# Patient Record
Sex: MALE | Race: WHITE | NOT HISPANIC OR LATINO | Employment: OTHER | URBAN - METROPOLITAN AREA
[De-identification: names, ages, dates, MRNs, and addresses within clinical notes are randomized per-mention and may not be internally consistent; named-entity substitution may affect disease eponyms.]

---

## 2020-09-12 ENCOUNTER — APPOINTMENT (OUTPATIENT)
Dept: RADIOLOGY | Facility: MEDICAL CENTER | Age: 75
DRG: 643 | End: 2020-09-12
Attending: EMERGENCY MEDICINE
Payer: MEDICARE

## 2020-09-12 ENCOUNTER — HOSPITAL ENCOUNTER (INPATIENT)
Facility: MEDICAL CENTER | Age: 75
LOS: 12 days | DRG: 643 | End: 2020-09-25
Attending: EMERGENCY MEDICINE | Admitting: HOSPITALIST
Payer: MEDICARE

## 2020-09-12 ENCOUNTER — APPOINTMENT (OUTPATIENT)
Dept: RADIOLOGY | Facility: MEDICAL CENTER | Age: 75
DRG: 643 | End: 2020-09-12
Attending: HOSPITALIST
Payer: MEDICARE

## 2020-09-12 DIAGNOSIS — D53.9 MACROCYTIC ANEMIA: ICD-10-CM

## 2020-09-12 DIAGNOSIS — G93.40 ACUTE ENCEPHALOPATHY: ICD-10-CM

## 2020-09-12 DIAGNOSIS — I16.1 HYPERTENSIVE EMERGENCY: ICD-10-CM

## 2020-09-12 DIAGNOSIS — I48.0 PAROXYSMAL ATRIAL FIBRILLATION (HCC): ICD-10-CM

## 2020-09-12 DIAGNOSIS — E87.6 HYPOKALEMIA: ICD-10-CM

## 2020-09-12 DIAGNOSIS — I63.9 CEREBROVASCULAR ACCIDENT (CVA), UNSPECIFIED MECHANISM (HCC): ICD-10-CM

## 2020-09-12 DIAGNOSIS — E87.1 HYPONATREMIA: ICD-10-CM

## 2020-09-12 PROBLEM — E78.5 DYSLIPIDEMIA: Status: ACTIVE | Noted: 2020-09-12

## 2020-09-12 PROBLEM — M54.9 BACK PAIN: Status: ACTIVE | Noted: 2020-09-12

## 2020-09-12 LAB
ABO + RH BLD: ABNORMAL
ABO GROUP BLD: ABNORMAL
ALBUMIN SERPL BCP-MCNC: 4.3 G/DL (ref 3.2–4.9)
ALBUMIN/GLOB SERPL: 1.7 G/DL
ALP SERPL-CCNC: 87 U/L (ref 30–99)
ALT SERPL-CCNC: 11 U/L (ref 2–50)
AMMONIA PLAS-SCNC: 18 UMOL/L (ref 11–45)
AMPHET UR QL SCN: NEGATIVE
ANION GAP SERPL CALC-SCNC: 14 MMOL/L (ref 7–16)
ANION GAP SERPL CALC-SCNC: 18 MMOL/L (ref 7–16)
APPEARANCE UR: CLEAR
APTT PPP: 33.8 SEC (ref 24.7–36)
AST SERPL-CCNC: 17 U/L (ref 12–45)
BACTERIA #/AREA URNS HPF: NEGATIVE /HPF
BARBITURATES UR QL SCN: NEGATIVE
BASOPHILS # BLD AUTO: 0.6 % (ref 0–1.8)
BASOPHILS # BLD: 0.04 K/UL (ref 0–0.12)
BENZODIAZ UR QL SCN: NEGATIVE
BILIRUB SERPL-MCNC: 1 MG/DL (ref 0.1–1.5)
BILIRUB UR QL STRIP.AUTO: NEGATIVE
BLD GP AB SCN SERPL QL: ABNORMAL
BUN SERPL-MCNC: 10 MG/DL (ref 8–22)
BUN SERPL-MCNC: 12 MG/DL (ref 8–22)
BZE UR QL SCN: NEGATIVE
CALCIUM SERPL-MCNC: 10 MG/DL (ref 8.5–10.5)
CALCIUM SERPL-MCNC: 10.1 MG/DL (ref 8.5–10.5)
CANNABINOIDS UR QL SCN: NEGATIVE
CHLORIDE SERPL-SCNC: 83 MMOL/L (ref 96–112)
CHLORIDE SERPL-SCNC: 84 MMOL/L (ref 96–112)
CO2 SERPL-SCNC: 25 MMOL/L (ref 20–33)
CO2 SERPL-SCNC: 28 MMOL/L (ref 20–33)
COLOR UR: YELLOW
COVID ORDER STATUS COVID19: NORMAL
CREAT SERPL-MCNC: 0.56 MG/DL (ref 0.5–1.4)
CREAT SERPL-MCNC: 0.71 MG/DL (ref 0.5–1.4)
EKG IMPRESSION: NORMAL
EOSINOPHIL # BLD AUTO: 0.02 K/UL (ref 0–0.51)
EOSINOPHIL NFR BLD: 0.3 % (ref 0–6.9)
EPI CELLS #/AREA URNS HPF: NEGATIVE /HPF
ERYTHROCYTE [DISTWIDTH] IN BLOOD BY AUTOMATED COUNT: 46.3 FL (ref 35.9–50)
GLOBULIN SER CALC-MCNC: 2.6 G/DL (ref 1.9–3.5)
GLUCOSE SERPL-MCNC: 115 MG/DL (ref 65–99)
GLUCOSE SERPL-MCNC: 125 MG/DL (ref 65–99)
GLUCOSE UR STRIP.AUTO-MCNC: 100 MG/DL
HCT VFR BLD AUTO: 36.7 % (ref 42–52)
HGB BLD-MCNC: 13.2 G/DL (ref 14–18)
HYALINE CASTS #/AREA URNS LPF: ABNORMAL /LPF
IMM GRANULOCYTES # BLD AUTO: 0.03 K/UL (ref 0–0.11)
IMM GRANULOCYTES NFR BLD AUTO: 0.4 % (ref 0–0.9)
INR PPP: 1.21 (ref 0.87–1.13)
KETONES UR STRIP.AUTO-MCNC: 15 MG/DL
LEUKOCYTE ESTERASE UR QL STRIP.AUTO: NEGATIVE
LYMPHOCYTES # BLD AUTO: 0.9 K/UL (ref 1–4.8)
LYMPHOCYTES NFR BLD: 12.5 % (ref 22–41)
MCH RBC QN AUTO: 35.9 PG (ref 27–33)
MCHC RBC AUTO-ENTMCNC: 36 G/DL (ref 33.7–35.3)
MCV RBC AUTO: 99.7 FL (ref 81.4–97.8)
METHADONE UR QL SCN: NEGATIVE
MICRO URNS: ABNORMAL
MONOCYTES # BLD AUTO: 0.64 K/UL (ref 0–0.85)
MONOCYTES NFR BLD AUTO: 8.9 % (ref 0–13.4)
NEUTROPHILS # BLD AUTO: 5.59 K/UL (ref 1.82–7.42)
NEUTROPHILS NFR BLD: 77.3 % (ref 44–72)
NITRITE UR QL STRIP.AUTO: NEGATIVE
NRBC # BLD AUTO: 0 K/UL
NRBC BLD-RTO: 0 /100 WBC
OPIATES UR QL SCN: NEGATIVE
OXYCODONE UR QL SCN: NEGATIVE
PCP UR QL SCN: NEGATIVE
PH UR STRIP.AUTO: 7.5 [PH] (ref 5–8)
PLATELET # BLD AUTO: 292 K/UL (ref 164–446)
PMV BLD AUTO: 9.4 FL (ref 9–12.9)
POTASSIUM SERPL-SCNC: 3.4 MMOL/L (ref 3.6–5.5)
POTASSIUM SERPL-SCNC: 3.5 MMOL/L (ref 3.6–5.5)
PROPOXYPH UR QL SCN: NEGATIVE
PROT SERPL-MCNC: 6.9 G/DL (ref 6–8.2)
PROT UR QL STRIP: 30 MG/DL
PROTHROMBIN TIME: 15.7 SEC (ref 12–14.6)
RBC # BLD AUTO: 3.68 M/UL (ref 4.7–6.1)
RBC # URNS HPF: ABNORMAL /HPF
RBC UR QL AUTO: ABNORMAL
RH BLD: ABNORMAL
SARS-COV-2 RNA RESP QL NAA+PROBE: NOTDETECTED
SODIUM SERPL-SCNC: 126 MMOL/L (ref 135–145)
SODIUM SERPL-SCNC: 126 MMOL/L (ref 135–145)
SP GR UR STRIP.AUTO: 1.01
SPECIMEN SOURCE: NORMAL
TROPONIN T SERPL-MCNC: 10 NG/L (ref 6–19)
UROBILINOGEN UR STRIP.AUTO-MCNC: 0.2 MG/DL
WBC # BLD AUTO: 7.2 K/UL (ref 4.8–10.8)
WBC #/AREA URNS HPF: ABNORMAL /HPF

## 2020-09-12 PROCEDURE — 93005 ELECTROCARDIOGRAM TRACING: CPT | Performed by: EMERGENCY MEDICINE

## 2020-09-12 PROCEDURE — 81001 URINALYSIS AUTO W/SCOPE: CPT

## 2020-09-12 PROCEDURE — A9270 NON-COVERED ITEM OR SERVICE: HCPCS | Performed by: HOSPITALIST

## 2020-09-12 PROCEDURE — 86900 BLOOD TYPING SEROLOGIC ABO: CPT

## 2020-09-12 PROCEDURE — 700111 HCHG RX REV CODE 636 W/ 250 OVERRIDE (IP): Performed by: HOSPITALIST

## 2020-09-12 PROCEDURE — 86850 RBC ANTIBODY SCREEN: CPT

## 2020-09-12 PROCEDURE — 700101 HCHG RX REV CODE 250: Performed by: HOSPITALIST

## 2020-09-12 PROCEDURE — U0003 INFECTIOUS AGENT DETECTION BY NUCLEIC ACID (DNA OR RNA); SEVERE ACUTE RESPIRATORY SYNDROME CORONAVIRUS 2 (SARS-COV-2) (CORONAVIRUS DISEASE [COVID-19]), AMPLIFIED PROBE TECHNIQUE, MAKING USE OF HIGH THROUGHPUT TECHNOLOGIES AS DESCRIBED BY CMS-2020-01-R: HCPCS

## 2020-09-12 PROCEDURE — G0378 HOSPITAL OBSERVATION PER HR: HCPCS

## 2020-09-12 PROCEDURE — 85610 PROTHROMBIN TIME: CPT

## 2020-09-12 PROCEDURE — 96375 TX/PRO/DX INJ NEW DRUG ADDON: CPT

## 2020-09-12 PROCEDURE — 82140 ASSAY OF AMMONIA: CPT

## 2020-09-12 PROCEDURE — 96372 THER/PROPH/DIAG INJ SC/IM: CPT

## 2020-09-12 PROCEDURE — 96365 THER/PROPH/DIAG IV INF INIT: CPT

## 2020-09-12 PROCEDURE — 80053 COMPREHEN METABOLIC PANEL: CPT

## 2020-09-12 PROCEDURE — 96366 THER/PROPH/DIAG IV INF ADDON: CPT

## 2020-09-12 PROCEDURE — 700105 HCHG RX REV CODE 258: Performed by: HOSPITALIST

## 2020-09-12 PROCEDURE — 86901 BLOOD TYPING SEROLOGIC RH(D): CPT

## 2020-09-12 PROCEDURE — 306565 RIGID MIT RESTRAINT(PAIR): Performed by: NURSE PRACTITIONER

## 2020-09-12 PROCEDURE — C9803 HOPD COVID-19 SPEC COLLECT: HCPCS | Performed by: INTERNAL MEDICINE

## 2020-09-12 PROCEDURE — 36415 COLL VENOUS BLD VENIPUNCTURE: CPT

## 2020-09-12 PROCEDURE — 70498 CT ANGIOGRAPHY NECK: CPT

## 2020-09-12 PROCEDURE — 700111 HCHG RX REV CODE 636 W/ 250 OVERRIDE (IP): Performed by: EMERGENCY MEDICINE

## 2020-09-12 PROCEDURE — 80048 BASIC METABOLIC PNL TOTAL CA: CPT

## 2020-09-12 PROCEDURE — 0042T CT-CEREBRAL PERFUSION ANALYSIS: CPT

## 2020-09-12 PROCEDURE — 700102 HCHG RX REV CODE 250 W/ 637 OVERRIDE(OP): Performed by: HOSPITALIST

## 2020-09-12 PROCEDURE — 70496 CT ANGIOGRAPHY HEAD: CPT

## 2020-09-12 PROCEDURE — 96376 TX/PRO/DX INJ SAME DRUG ADON: CPT

## 2020-09-12 PROCEDURE — 84484 ASSAY OF TROPONIN QUANT: CPT

## 2020-09-12 PROCEDURE — 99285 EMERGENCY DEPT VISIT HI MDM: CPT

## 2020-09-12 PROCEDURE — 70450 CT HEAD/BRAIN W/O DYE: CPT

## 2020-09-12 PROCEDURE — 700117 HCHG RX CONTRAST REV CODE 255: Performed by: EMERGENCY MEDICINE

## 2020-09-12 PROCEDURE — 96374 THER/PROPH/DIAG INJ IV PUSH: CPT

## 2020-09-12 PROCEDURE — 85025 COMPLETE CBC W/AUTO DIFF WBC: CPT

## 2020-09-12 PROCEDURE — 72080 X-RAY EXAM THORACOLMB 2/> VW: CPT

## 2020-09-12 PROCEDURE — 80307 DRUG TEST PRSMV CHEM ANLYZR: CPT

## 2020-09-12 PROCEDURE — 700101 HCHG RX REV CODE 250: Performed by: EMERGENCY MEDICINE

## 2020-09-12 PROCEDURE — 85730 THROMBOPLASTIN TIME PARTIAL: CPT

## 2020-09-12 PROCEDURE — 71045 X-RAY EXAM CHEST 1 VIEW: CPT

## 2020-09-12 PROCEDURE — 99220 PR INITIAL OBSERVATION CARE,LEVL III: CPT | Performed by: HOSPITALIST

## 2020-09-12 RX ORDER — SIMVASTATIN 20 MG
20 TABLET ORAL DAILY
Status: ON HOLD | COMMUNITY
End: 2020-10-05 | Stop reason: SDUPTHER

## 2020-09-12 RX ORDER — ONDANSETRON 4 MG/1
4 TABLET, ORALLY DISINTEGRATING ORAL EVERY 4 HOURS PRN
Status: DISCONTINUED | OUTPATIENT
Start: 2020-09-12 | End: 2020-09-14

## 2020-09-12 RX ORDER — SOTALOL HYDROCHLORIDE 80 MG/1
80-120 TABLET ORAL 2 TIMES DAILY
Status: DISCONTINUED | OUTPATIENT
Start: 2020-09-12 | End: 2020-09-12

## 2020-09-12 RX ORDER — SOTALOL HYDROCHLORIDE 120 MG/1
120 TABLET ORAL EVERY EVENING
Status: DISCONTINUED | OUTPATIENT
Start: 2020-09-12 | End: 2020-09-14

## 2020-09-12 RX ORDER — ALPRAZOLAM 0.5 MG/1
0.5 TABLET ORAL
Status: ON HOLD | COMMUNITY
End: 2020-09-25

## 2020-09-12 RX ORDER — ACETAMINOPHEN 500 MG
500 TABLET ORAL 4 TIMES DAILY
Status: DISCONTINUED | OUTPATIENT
Start: 2020-09-12 | End: 2020-09-14

## 2020-09-12 RX ORDER — METOPROLOL SUCCINATE 25 MG/1
25 TABLET, EXTENDED RELEASE ORAL DAILY
Status: ON HOLD | COMMUNITY
End: 2020-09-25

## 2020-09-12 RX ORDER — SOTALOL HYDROCHLORIDE 80 MG/1
80-120 TABLET ORAL 2 TIMES DAILY
Status: ON HOLD | COMMUNITY
End: 2020-10-05 | Stop reason: SDUPTHER

## 2020-09-12 RX ORDER — LEUPROLIDE ACETATE 45 MG
45 KIT INTRAMUSCULAR
COMMUNITY

## 2020-09-12 RX ORDER — POTASSIUM CHLORIDE 7.45 MG/ML
10 INJECTION INTRAVENOUS ONCE
Status: COMPLETED | OUTPATIENT
Start: 2020-09-12 | End: 2020-09-12

## 2020-09-12 RX ORDER — LABETALOL HYDROCHLORIDE 5 MG/ML
10 INJECTION, SOLUTION INTRAVENOUS EVERY 4 HOURS PRN
Status: DISCONTINUED | OUTPATIENT
Start: 2020-09-12 | End: 2020-09-25 | Stop reason: HOSPADM

## 2020-09-12 RX ORDER — ANTIOX #8/OM3/DHA/EPA/LUT/ZEAX 250-2.5 MG
1 CAPSULE ORAL DAILY
COMMUNITY

## 2020-09-12 RX ORDER — POTASSIUM CHLORIDE 20 MEQ/1
20 TABLET, EXTENDED RELEASE ORAL ONCE
Status: ACTIVE | OUTPATIENT
Start: 2020-09-12 | End: 2020-09-13

## 2020-09-12 RX ORDER — BUDESONIDE AND FORMOTEROL FUMARATE DIHYDRATE 160; 4.5 UG/1; UG/1
2 AEROSOL RESPIRATORY (INHALATION) 2 TIMES DAILY PRN
Status: ON HOLD | COMMUNITY
End: 2020-10-05 | Stop reason: SDUPTHER

## 2020-09-12 RX ORDER — ONDANSETRON 2 MG/ML
4 INJECTION INTRAMUSCULAR; INTRAVENOUS EVERY 4 HOURS PRN
Status: DISCONTINUED | OUTPATIENT
Start: 2020-09-12 | End: 2020-09-14

## 2020-09-12 RX ORDER — SOTALOL HYDROCHLORIDE 80 MG/1
80 TABLET ORAL EVERY MORNING
Status: DISCONTINUED | OUTPATIENT
Start: 2020-09-13 | End: 2020-09-14

## 2020-09-12 RX ORDER — ACETAMINOPHEN 500 MG
1000 TABLET ORAL 2 TIMES DAILY PRN
COMMUNITY

## 2020-09-12 RX ORDER — MAGNESIUM SULFATE HEPTAHYDRATE 40 MG/ML
2 INJECTION, SOLUTION INTRAVENOUS ONCE
Status: COMPLETED | OUTPATIENT
Start: 2020-09-12 | End: 2020-09-12

## 2020-09-12 RX ORDER — SODIUM CHLORIDE 9 MG/ML
INJECTION, SOLUTION INTRAVENOUS CONTINUOUS
Status: DISCONTINUED | OUTPATIENT
Start: 2020-09-12 | End: 2020-09-16

## 2020-09-12 RX ORDER — LABETALOL HYDROCHLORIDE 5 MG/ML
10 INJECTION, SOLUTION INTRAVENOUS ONCE
Status: COMPLETED | OUTPATIENT
Start: 2020-09-12 | End: 2020-09-12

## 2020-09-12 RX ORDER — MONTELUKAST SODIUM 10 MG/1
10 TABLET ORAL DAILY
Status: ON HOLD | COMMUNITY
End: 2020-10-05 | Stop reason: SDUPTHER

## 2020-09-12 RX ORDER — AMOXICILLIN 250 MG
2 CAPSULE ORAL 2 TIMES DAILY
Status: DISCONTINUED | OUTPATIENT
Start: 2020-09-12 | End: 2020-09-14

## 2020-09-12 RX ORDER — MONTELUKAST SODIUM 10 MG/1
10 TABLET ORAL DAILY
Status: DISCONTINUED | OUTPATIENT
Start: 2020-09-13 | End: 2020-09-14

## 2020-09-12 RX ORDER — SIMVASTATIN 20 MG
20 TABLET ORAL EVERY EVENING
Status: DISCONTINUED | OUTPATIENT
Start: 2020-09-12 | End: 2020-09-14

## 2020-09-12 RX ORDER — POLYETHYLENE GLYCOL 3350 17 G/17G
1 POWDER, FOR SOLUTION ORAL
Status: DISCONTINUED | OUTPATIENT
Start: 2020-09-12 | End: 2020-09-14

## 2020-09-12 RX ORDER — FEXOFENADINE HCL 180 MG/1
180 TABLET ORAL DAILY
COMMUNITY

## 2020-09-12 RX ORDER — SIMETHICONE 180 MG
180 CAPSULE ORAL DAILY
Status: ON HOLD | COMMUNITY
End: 2020-10-05

## 2020-09-12 RX ORDER — LIDOCAINE 4 G/G
1 PATCH TOPICAL
COMMUNITY

## 2020-09-12 RX ORDER — LORAZEPAM 2 MG/ML
1 INJECTION INTRAMUSCULAR ONCE
Status: COMPLETED | OUTPATIENT
Start: 2020-09-12 | End: 2020-09-12

## 2020-09-12 RX ORDER — LIDOCAINE 50 MG/G
1 PATCH TOPICAL
Status: DISCONTINUED | OUTPATIENT
Start: 2020-09-12 | End: 2020-09-25 | Stop reason: HOSPADM

## 2020-09-12 RX ORDER — BISACODYL 10 MG
10 SUPPOSITORY, RECTAL RECTAL
Status: DISCONTINUED | OUTPATIENT
Start: 2020-09-12 | End: 2020-09-14

## 2020-09-12 RX ORDER — BUDESONIDE AND FORMOTEROL FUMARATE DIHYDRATE 160; 4.5 UG/1; UG/1
2 AEROSOL RESPIRATORY (INHALATION) 2 TIMES DAILY PRN
Status: DISCONTINUED | OUTPATIENT
Start: 2020-09-12 | End: 2020-09-25 | Stop reason: HOSPADM

## 2020-09-12 RX ORDER — ACETAMINOPHEN 650 MG/1
650 SUPPOSITORY RECTAL ONCE
Status: COMPLETED | OUTPATIENT
Start: 2020-09-12 | End: 2020-09-13

## 2020-09-12 RX ORDER — ANTIOX #8/OM3/DHA/EPA/LUT/ZEAX 250-2.5 MG
1 CAPSULE ORAL DAILY
Status: DISCONTINUED | OUTPATIENT
Start: 2020-09-12 | End: 2020-09-12

## 2020-09-12 RX ORDER — METOPROLOL SUCCINATE 25 MG/1
25 TABLET, EXTENDED RELEASE ORAL DAILY
Status: DISCONTINUED | OUTPATIENT
Start: 2020-09-13 | End: 2020-09-14

## 2020-09-12 RX ADMIN — IOHEXOL 80 ML: 350 INJECTION, SOLUTION INTRAVENOUS at 13:00

## 2020-09-12 RX ADMIN — LABETALOL HYDROCHLORIDE 10 MG: 5 INJECTION, SOLUTION INTRAVENOUS at 14:12

## 2020-09-12 RX ADMIN — ENOXAPARIN SODIUM 40 MG: 40 INJECTION SUBCUTANEOUS at 15:55

## 2020-09-12 RX ADMIN — LIDOCAINE 1 PATCH: 50 PATCH TOPICAL at 17:59

## 2020-09-12 RX ADMIN — LABETALOL HYDROCHLORIDE 10 MG: 5 INJECTION, SOLUTION INTRAVENOUS at 13:42

## 2020-09-12 RX ADMIN — LABETALOL HYDROCHLORIDE 10 MG: 5 INJECTION, SOLUTION INTRAVENOUS at 21:48

## 2020-09-12 RX ADMIN — LORAZEPAM 1 MG: 2 INJECTION INTRAMUSCULAR; INTRAVENOUS at 13:58

## 2020-09-12 RX ADMIN — IOHEXOL 40 ML: 350 INJECTION, SOLUTION INTRAVENOUS at 13:02

## 2020-09-12 RX ADMIN — POTASSIUM CHLORIDE 10 MEQ: 7.46 INJECTION, SOLUTION INTRAVENOUS at 21:22

## 2020-09-12 RX ADMIN — SODIUM CHLORIDE: 9 INJECTION, SOLUTION INTRAVENOUS at 15:50

## 2020-09-12 RX ADMIN — MAGNESIUM SULFATE 2 G: 2 INJECTION INTRAVENOUS at 18:02

## 2020-09-12 ASSESSMENT — CHA2DS2 SCORE
DIABETES: NO
SEX: MALE
CHA2DS2 VASC SCORE: 5
AGE 75 OR GREATER: YES
CHF OR LEFT VENTRICULAR DYSFUNCTION: NO
VASCULAR DISEASE: NO
AGE 65 TO 74: NO
HYPERTENSION: YES
PRIOR STROKE OR TIA OR THROMBOEMBOLISM: YES

## 2020-09-12 ASSESSMENT — PAIN DESCRIPTION - PAIN TYPE: TYPE: ACUTE PAIN

## 2020-09-12 ASSESSMENT — FIBROSIS 4 INDEX: FIB4 SCORE: 1.33

## 2020-09-12 NOTE — PROGRESS NOTES
Triage note    76 yo man with afib on Eliquis who presented with confusion and right side weakness.  CT imaging was done  Neuro consulted and unlikely stroke. Patient is mainly confused and agitated.  Hypertensive to 232/112  Dr. Liane Eden to admit

## 2020-09-12 NOTE — CONSULTS
Called by Dr. Reid:    Patient awoke and was slightly confused.  His confusion progressed and eventually EMS was called.  He was noted to have some right hemiparesis while being transferred however this resolved.  Stroke IR was activated and a CT head without contrast did not show hemorrhage.  CTA of the head and neck did not show large vessel occlusion.  CT perfusion was artifactual.  On presentation the patient's systolic blood pressure was 230 for which he received some labetalol.  He is also noted to have a sodium of 126.  At this point, the patient is not a candidate for intervention as there is no large vessel occlusion and he is not a candidate for TPA as last known well is greater than 4-1/2 hours.  It is likely at this point that the patient's deficits are secondary to high blood pressure and hyponatremia and likely a metabolic encephalopathy.  An MRI of the brain without contrast should be obtained in order to conclusively rule out ischemia.  If there is ischemia on said MRI then call neurology for further recommendations.

## 2020-09-12 NOTE — ASSESSMENT & PLAN NOTE
Improving.    Etiology is not entirely clear patient evaluated by neurology not felt to be a TPA candidate.  Possible hypertensive encephalopathy and some component of metabolic encephalopathy.  CT head and CTA head and neck reviewed.  No evidence of acute infection, procal wnl.  MRI of brain with chronic but no acute abnormalities.   EEG with encephalopathy, no seizure captured.  Not polypharmacy given med list.  Continue pt/ot/speech therapies

## 2020-09-12 NOTE — ED PROVIDER NOTES
ED Provider Note    CHIEF COMPLAINT  Chief Complaint   Patient presents with   • Possible Stroke   • ALOC   • T-5000 FALL        HPI  Jordan Alexander is a 75 y.o. male who presents with altered mental status and possible stroke.  Patient was seen here as a code stroke.  Patient was last known well according to his wife at 6:30 AM.  He woke with some mild confusion but that is his baseline.  However he quickly became worse and had a dense right-sided hemiparesis when he was seen by the outside EMS agency.  He was transferred here.  Patient is on Eliquis for A. fib.  No further details of the history of present illness could be obtained within the constraints of the urgency the patient's clinical condition.  He is altered and confused.  Patient did have a fall earlier in the week    REVIEW OF SYSTEMS  See HPI for further details.  No fever no chills.  No cough or cold symptoms.  All other systems are negative.    PAST MEDICAL HISTORY  No past medical history on file.    FAMILY HISTORY  No family history on file.    SOCIAL HISTORY  Social History     Socioeconomic History   • Marital status: Single     Spouse name: Not on file   • Number of children: Not on file   • Years of education: Not on file   • Highest education level: Not on file   Occupational History   • Not on file   Social Needs   • Financial resource strain: Not on file   • Food insecurity     Worry: Not on file     Inability: Not on file   • Transportation needs     Medical: Not on file     Non-medical: Not on file   Tobacco Use   • Smoking status: Not on file   Substance and Sexual Activity   • Alcohol use: Not on file   • Drug use: Not on file   • Sexual activity: Not on file   Lifestyle   • Physical activity     Days per week: Not on file     Minutes per session: Not on file   • Stress: Not on file   Relationships   • Social connections     Talks on phone: Not on file     Gets together: Not on file     Attends Hoah[employer]ism service: Not on file     Active  "member of club or organization: Not on file     Attends meetings of clubs or organizations: Not on file     Relationship status: Not on file   • Intimate partner violence     Fear of current or ex partner: Not on file     Emotionally abused: Not on file     Physically abused: Not on file     Forced sexual activity: Not on file   Other Topics Concern   • Not on file   Social History Narrative   • Not on file       SURGICAL HISTORY  No past surgical history on file.    CURRENT MEDICATIONS  Home Medications     Reviewed by Shelley Lacy R.N. (Registered Nurse) on 09/12/20 at 1259  Med List Status: Partial   Medication Last Dose Status   Apixaban (ELIQUIS PO)  Active                ALLERGIES  No Known Allergies    PHYSICAL EXAM  VITAL SIGNS: BP (!) 232/112   Pulse 81   Temp 36.3 °C (97.4 °F) (Temporal)   Resp 18   Ht 1.702 m (5' 7\")   Wt 68 kg (150 lb) Comment: 2 staff estimate  SpO2 95%   BMI 23.49 kg/m²   Constitutional: Well developed, Well nourished, No acute distress,   HENT: Normocephalic atraumatic  Eyes: Pupils are equal reactive to light extraocular movements are intact  Neck: Normal range of motion, No tenderness,   Cardiovascular: Normal heart rate, Normal rhythm, No murmurs, No rubs, No gallops.   Thorax & Lungs: Normal breath sounds, No respiratory distress,   Abdomen: Bowel sounds normal, Soft, No tenderness, No masses, No pulsatile masses.   Skin: Warm, Dry, No erythema, No rash.   Back: No tenderness, No CVA tenderness.   Extremities: No edema, No tenderness, No cyanosis, No clubbing. Dorsalis pedis pulses 2+ equal bilaterally. Radial pulses 2+ equal bilaterally   Neurologic: Normal deep tendon reflexes.  NIH stroke scale was 13 on arrival.  Strength sensation shows decreased strength and sensation in the right upper and lower extremity.  Psychiatric: Affect flat.  Awake.  Oriented only to self.    EKG  Sinus rhythm at a rate of 80.  Normal P waves.  First-degree AV block.  R prime in V1 and V2 " consistent with a incomplete right bundle branch block.  ST depression V4 V5 V6.  Abnormal EKG showing no signs of atrial fibrillation.    RADIOLOGY/PROCEDURES  DX-THORACOLUMBAR SPINE-2 VIEWS   Final Result      1.  No acute fracture or dislocation identified.      2.  Degenerative changes are noted.      DX-CHEST-PORTABLE (1 VIEW)   Final Result      1.  Cardiomegaly.      2.  Mild bilateral patchy pulmonary infiltrates.      CT-CTA NECK WITH & W/O-POST PROCESSING   Final Result      1.  Atherosclerotic plaque involving the common and internal carotid arteries bilaterally most prominently seen at the bifurcations. This results in less than 50% diameter narrowing bilaterally.      2.  Atherosclerotic plaque involving the vertebral arteries bilaterally. No evidence of occlusion.      CT-CEREBRAL PERFUSION ANALYSIS   Final Result      1.  Cerebral blood flow less than 30% likely representing completed infarct = 0 mL.      2.  T Max more than 6 seconds likely representing combination of completed infarct and ischemia = 267 mL.      3.  Mismatched volume likely representing ischemic brain/penumbra = 267 mL      4.  Please note that the cerebral perfusion was performed on the limited brain tissue around the basal ganglia region. Infarct/ischemia outside the CT perfusion sections can be missed in this study.      CT-CTA HEAD WITH & W/O-POST PROCESS   Final Result      No evidence of intracranial vascular occlusion or aneurysm.      CT-HEAD W/O   Final Result         NO ACUTE ABNORMALITIES ARE NOTED ON CT SCAN OF THE HEAD.      Findings are consistent with atrophy.  Decreased attenuation in the periventricular white matter likely indicates microvascular ischemic disease.      MR-BRAIN-W/O    (Results Pending)         COURSE & MEDICAL DECISION MAKING  Pertinent Labs & Imaging studies reviewed. (See chart for details)  Differential diagnosis includes stroke versus hypertensive emergency.  I consulted the neurologist   Clari.  He wants to get an MRI of the brain.  CT scan did not show any signs of hemorrhage.  Patient was given IV labetalol for blood pressure control    Patient is not a TPA candidate as he is outside the 4-1/2-hour window.  He is also on Eliquis.  NIH stroke scale was 13 on arrival.  There is no large vessel occlusion.  Patient is not a candidate for thrombectomy.    Patient is critically ill.   The patient continues to have: Altered mental status delirium  The vital organ system that is affected is the: Brain  If untreated there is a high chance of deterioration into: Coma  And eventually death.   The critical care that I am providing today is: Involving medical management multiple consultations  The critical that has been undertaken is medically complex.   There has been no overlap in critical care time.   Critical Care Time not including procedures: 40 minutes    FINAL IMPRESSION    1. Cerebrovascular accident (CVA), unspecified mechanism (HCC)     2. Hypertensive emergency     3. Hyponatremia       2.   3.          Electronically signed by: Bandar Reid M.D., 9/12/2020 2:23 PM

## 2020-09-12 NOTE — ED NOTES
Assist RN to:  Assigned floor RN Shima contacted, notified pt ready for transfer.    
Assist RN to:  covid swab obtained and sent   
ERP aware pt now moving R side with improved strength.  Pt remains restless and trying to get out of bed.  Hypertensive.  Report given to Romina VALLECILLO.  
Med rec complete per pt's wife Cynthia @ [phone]  Allergies reviewed - NKDA  No ABX in last 14 days   
Unable to complete med rec   Pt unable to interview   No other information at this time     Will update if more information becomes available   
no

## 2020-09-12 NOTE — PROGRESS NOTES
Report received Yanni PERERA RN. Pt transferred to room via [place] with ZOLL with this RN. Pt confused, not following commands, non verbal, restless. Tele monitor on.  Assessment complete.  Explained importance of calling before getting OOB. Call light and belongings within reach. Bed alarm on. Bed in the lowest position. Treaded socks in place. Hourly rounding in progress. Will continue to monitor .

## 2020-09-12 NOTE — PROGRESS NOTES
2 RN skin check complete with Marina RN  Devices in place : scds, PIV.  Skin assessed under devices : yes.  Confirmed pressure ulcers found on : none.  New potential pressure ulcers noted on : none. Wound consult placed : N/A    .Noted skin tear to right forearm, sacrum red but blanching.    The following interventions in place: mepilex to sacrum, waffle cushion, q2 turns, condom cath for incontinence, barrier cream

## 2020-09-12 NOTE — ASSESSMENT & PLAN NOTE
Improving some, 127-130  Low serum osm, likely hypovolemic on presentation, but did not improve with NS. No new urine osm collected, was high on admission.  High urine sodium may suggest SIADH, but this is a diagnosis of exclusion and there is no clear provoking factor for SIADH.  Free water flushes are being held and free water components of IV medications minimized.

## 2020-09-12 NOTE — ED TRIAGE NOTES
"Jordan Alexander 75 y.o. male bib EMS as a field ambulance from [place] for     Chief Complaint   Patient presents with   • Possible Stroke   • ALOC   • T-5000 FALL     Per EMS, pt woke with \"normal baseline slight confusion\" at 0630 but then became increasingly confused until 1100 when pt's wife called 911.  Then EMS states patient had RLE weakness onset at 1105.  Wife reported patient had a fall last week and did not get evaluated after the fall.  Pt takes Eliquis for a-fib.  Minimal history was obtainable by EMS and pt has no historical visits.  Pt arrives w/ RUE and RLE weakness, non-verbal, moaning intermittently and appears in pain but unable to assess where.  Pt labs drawn at charge desk.  Pt to CT via ivan dalal/ RN.  Pt to R04, report given to Romina VALLECILLO.  Pt now more verbal, answers some yes/no questions but does not form sentences.  Pt restless, agitated, trying to get up out of gurney and roll to his side.  Pt hypertensive.    "

## 2020-09-12 NOTE — H&P
Hospital Medicine History & Physical Note    Date of Service  9/12/2020    Primary Care Physician  No primary care provider on file.    Consultants  Neurology    Code Status  Full Code    Chief Complaint  Chief Complaint   Patient presents with   • Possible Stroke   • ALOC   • T-5000 FALL       History of Presenting Illness  75 y.o. male who presented 9/12/2020 with altered mental status.  Apparently the patient was transferred for evaluation of altered mental status that started this morning progressively got worse was associated with some right-sided weakness that started around 11:05 a.m..  The patient was transferred as a code stroke he was evaluated by neurology and felt that his symptoms are likely related to hypertensive and possibly metabolic encephalopathy.  The patient is unable to provide any history and there is no family at the bedside information is obtained from review of available records and discussion with ED staff.  Apparently the patient sustained a fall 1 week ago details of his fall is not available.  He was significantly hypertensive on presentation with a systolic blood pressure of 232/112 and he was noted to have hyponatremia.  He received labetalol.  At the time of my examination patient systolic blood pressure is154/82.  He is impulsive and agitated he is not answering any questions.  Apparently he is on Eliquis for history of atrial fibrillation.  He has no prior medical history in our system and is unable to participate in review of systems or give any further details about his medical history.    Review of Systems  Review of Systems   Unable to perform ROS: Mental status change       Past Medical History  Unable to obtain due to his medical condition    Surgical History  Unable to obtain due to his medical condition    Family History  Unable to obtain due to his medical condition    Social History    Unable to obtain due to his medical condition    Allergies  No Known  Allergies    Medications  Prior to Admission Medications   Prescriptions Last Dose Informant Patient Reported? Taking?   apixaban (ELIQUIS) 5mg Tab   Yes No   Sig: Take 5 mg by mouth 2 Times a Day.   leuprolide acetate, 6 Month, (LUPRON DEPOT, 6-MONTH,) 45 MG Kit kit 2020 at unknown  Yes Yes   Si mg by Intramuscular route every 6 months.   simvastatin (ZOCOR) 20 MG Tab   Yes No   Sig: Take 20 mg by mouth every day.      Facility-Administered Medications: None       Physical Exam  Temp:  [36.3 °C (97.4 °F)] 36.3 °C (97.4 °F)  Pulse:  [81-99] 99  Resp:  [10-18] 10  BP: (140-232)/() 140/82  SpO2:  [92 %-95 %] 93 %    Physical Exam  Vitals signs and nursing note reviewed.   Constitutional:       Appearance: He is well-developed. He is not diaphoretic.   HENT:      Head: Normocephalic and atraumatic.      Mouth/Throat:      Pharynx: No oropharyngeal exudate.   Eyes:      General: No scleral icterus.        Right eye: No discharge.         Left eye: No discharge.      Conjunctiva/sclera: Conjunctivae normal.      Pupils: Pupils are equal, round, and reactive to light.   Neck:      Musculoskeletal: Neck supple.      Vascular: No JVD.      Trachea: No tracheal deviation.   Cardiovascular:      Rate and Rhythm: Regular rhythm. Tachycardia present.      Heart sounds: No murmur. No friction rub. No gallop.    Pulmonary:      Effort: Pulmonary effort is normal. No respiratory distress.      Breath sounds: Normal breath sounds. No stridor. No wheezing.   Chest:      Chest wall: No tenderness.   Abdominal:      General: Bowel sounds are normal. There is no distension.      Palpations: Abdomen is soft.      Tenderness: There is no abdominal tenderness. There is no rebound.   Musculoskeletal:         General: Tenderness present.   Skin:     General: Skin is warm and dry.      Nails: There is no clubbing.     Neurological:      Mental Status: He is alert. He is disoriented.      Motor: No abnormal muscle tone.       Comments: Patient is agitated does not follow commands moves all extremities spontaneously unable to answer any questions   Psychiatric:         Behavior: Behavior is agitated.         Cognition and Memory: Cognition is impaired.         Judgment: Judgment is impulsive.         Laboratory:  Recent Labs     09/12/20  1237   WBC 7.2   RBC 3.68*   HEMOGLOBIN 13.2*   HEMATOCRIT 36.7*   MCV 99.7*   MCH 35.9*   MCHC 36.0*   RDW 46.3   PLATELETCT 292   MPV 9.4     Recent Labs     09/12/20  1237   SODIUM 126*   POTASSIUM 3.5*   CHLORIDE 84*   CO2 28   GLUCOSE 115*   BUN 12   CREATININE 0.71   CALCIUM 10.1     Recent Labs     09/12/20  1237   ALTSGPT 11   ASTSGOT 17   ALKPHOSPHAT 87   TBILIRUBIN 1.0   GLUCOSE 115*     Recent Labs     09/12/20  1237   APTT 33.8   INR 1.21*     No results for input(s): NTPROBNP in the last 72 hours.      Recent Labs     09/12/20  1237   TROPONINT 10       Imaging:  DX-CHEST-PORTABLE (1 VIEW)   Final Result      1.  Cardiomegaly.      2.  Mild bilateral patchy pulmonary infiltrates.      CT-CTA NECK WITH & W/O-POST PROCESSING   Final Result      1.  Atherosclerotic plaque involving the common and internal carotid arteries bilaterally most prominently seen at the bifurcations. This results in less than 50% diameter narrowing bilaterally.      2.  Atherosclerotic plaque involving the vertebral arteries bilaterally. No evidence of occlusion.      CT-CEREBRAL PERFUSION ANALYSIS   Final Result      1.  Cerebral blood flow less than 30% likely representing completed infarct = 0 mL.      2.  T Max more than 6 seconds likely representing combination of completed infarct and ischemia = 267 mL.      3.  Mismatched volume likely representing ischemic brain/penumbra = 267 mL      4.  Please note that the cerebral perfusion was performed on the limited brain tissue around the basal ganglia region. Infarct/ischemia outside the CT perfusion sections can be missed in this study.      CT-CTA HEAD WITH &  W/O-POST PROCESS   Final Result      No evidence of intracranial vascular occlusion or aneurysm.      CT-HEAD W/O   Final Result         NO ACUTE ABNORMALITIES ARE NOTED ON CT SCAN OF THE HEAD.      Findings are consistent with atrophy.  Decreased attenuation in the periventricular white matter likely indicates microvascular ischemic disease.      MR-BRAIN-W/O    (Results Pending)         Assessment/Plan:  I anticipate this patient is appropriate for observation status at this time.    * Acute encephalopathy  Assessment & Plan  Etiology is not entirely clear patient evaluated by neurology not felt to be a TPA candidate  Possible hypertensive encephalopathy and some component of metabolic encephalopathy  CT head and CTA head and neck reviewed  Patient will be admitted for close clinical monitoring  We will check MRI of brain  His blood pressure is improved after IV labetalol will review and reorder his home meds and monitor blood pressure and order PRN labetalol  Avoid sedating agents  We will check urinalysis urine drug screen and ammonia level    Hypokalemia  Assessment & Plan  Replete and monitor    Dyslipidemia  Assessment & Plan  Continue simvastatin    Back pain  Assessment & Plan  Given recent fall will check x-ray    Paroxysmal atrial fibrillation (HCC)  Assessment & Plan  Continue Eliquis and sotalol    Hyponatremia  Assessment & Plan  Possibly contributing to his acute encephalopathy  IV hydration and monitor levels  Etiology is not entirely clear          Addendum:    I was able to reach patient's wife by phone and obtain further history.  Apparently he fell about 10 days ago and has been complaining of low back pain and had a mild contusion to his scalp.  His confusion started this morning initially with difficulty finding words and not following commands and progressively got worse.  She reports that he had a similar episode a few years ago and had a stroke work-up which was negative and his symptoms  resolved spontaneously.  She confirms that he has A. fib and has been taking Eliquis and sotalol and metoprolol.  She reports that he has chronic hyponatremia and that his sodium is usually in the 120s.  She reports that his blood pressure is usually under control.

## 2020-09-13 ENCOUNTER — APPOINTMENT (OUTPATIENT)
Dept: RADIOLOGY | Facility: MEDICAL CENTER | Age: 75
DRG: 643 | End: 2020-09-13
Attending: HOSPITALIST
Payer: MEDICARE

## 2020-09-13 LAB
ANION GAP SERPL CALC-SCNC: 16 MMOL/L (ref 7–16)
BASOPHILS # BLD AUTO: 0.2 % (ref 0–1.8)
BASOPHILS # BLD: 0.02 K/UL (ref 0–0.12)
BUN SERPL-MCNC: 12 MG/DL (ref 8–22)
CALCIUM SERPL-MCNC: 9.7 MG/DL (ref 8.5–10.5)
CHLORIDE SERPL-SCNC: 83 MMOL/L (ref 96–112)
CO2 SERPL-SCNC: 28 MMOL/L (ref 20–33)
CREAT SERPL-MCNC: 0.58 MG/DL (ref 0.5–1.4)
EOSINOPHIL # BLD AUTO: 0 K/UL (ref 0–0.51)
EOSINOPHIL NFR BLD: 0 % (ref 0–6.9)
ERYTHROCYTE [DISTWIDTH] IN BLOOD BY AUTOMATED COUNT: 46.5 FL (ref 35.9–50)
GLUCOSE SERPL-MCNC: 133 MG/DL (ref 65–99)
HCT VFR BLD AUTO: 38.3 % (ref 42–52)
HGB BLD-MCNC: 13.7 G/DL (ref 14–18)
IMM GRANULOCYTES # BLD AUTO: 0.05 K/UL (ref 0–0.11)
IMM GRANULOCYTES NFR BLD AUTO: 0.5 % (ref 0–0.9)
LYMPHOCYTES # BLD AUTO: 0.78 K/UL (ref 1–4.8)
LYMPHOCYTES NFR BLD: 7.5 % (ref 22–41)
MAGNESIUM SERPL-MCNC: 1.5 MG/DL (ref 1.5–2.5)
MCH RBC QN AUTO: 36.1 PG (ref 27–33)
MCHC RBC AUTO-ENTMCNC: 35.8 G/DL (ref 33.7–35.3)
MCV RBC AUTO: 100.8 FL (ref 81.4–97.8)
MONOCYTES # BLD AUTO: 0.85 K/UL (ref 0–0.85)
MONOCYTES NFR BLD AUTO: 8.2 % (ref 0–13.4)
NEUTROPHILS # BLD AUTO: 8.68 K/UL (ref 1.82–7.42)
NEUTROPHILS NFR BLD: 83.6 % (ref 44–72)
NRBC # BLD AUTO: 0 K/UL
NRBC BLD-RTO: 0 /100 WBC
OSMOLALITY SERPL: 269 MOSM/KG H2O (ref 278–298)
PHOSPHATE SERPL-MCNC: 3.3 MG/DL (ref 2.5–4.5)
PLATELET # BLD AUTO: 346 K/UL (ref 164–446)
PMV BLD AUTO: 9.5 FL (ref 9–12.9)
POTASSIUM SERPL-SCNC: 3.4 MMOL/L (ref 3.6–5.5)
PROCALCITONIN SERPL-MCNC: <0.05 NG/ML
RBC # BLD AUTO: 3.8 M/UL (ref 4.7–6.1)
SODIUM SERPL-SCNC: 127 MMOL/L (ref 135–145)
WBC # BLD AUTO: 10.4 K/UL (ref 4.8–10.8)

## 2020-09-13 PROCEDURE — A9270 NON-COVERED ITEM OR SERVICE: HCPCS | Performed by: HOSPITALIST

## 2020-09-13 PROCEDURE — 36415 COLL VENOUS BLD VENIPUNCTURE: CPT

## 2020-09-13 PROCEDURE — A9270 NON-COVERED ITEM OR SERVICE: HCPCS | Performed by: NURSE PRACTITIONER

## 2020-09-13 PROCEDURE — 700102 HCHG RX REV CODE 250 W/ 637 OVERRIDE(OP): Performed by: NURSE PRACTITIONER

## 2020-09-13 PROCEDURE — 85025 COMPLETE CBC W/AUTO DIFF WBC: CPT

## 2020-09-13 PROCEDURE — G0378 HOSPITAL OBSERVATION PER HR: HCPCS

## 2020-09-13 PROCEDURE — 83930 ASSAY OF BLOOD OSMOLALITY: CPT

## 2020-09-13 PROCEDURE — 84100 ASSAY OF PHOSPHORUS: CPT

## 2020-09-13 PROCEDURE — 70551 MRI BRAIN STEM W/O DYE: CPT

## 2020-09-13 PROCEDURE — 700102 HCHG RX REV CODE 250 W/ 637 OVERRIDE(OP): Performed by: HOSPITALIST

## 2020-09-13 PROCEDURE — 700105 HCHG RX REV CODE 258: Performed by: HOSPITALIST

## 2020-09-13 PROCEDURE — 84145 PROCALCITONIN (PCT): CPT

## 2020-09-13 PROCEDURE — 80048 BASIC METABOLIC PNL TOTAL CA: CPT

## 2020-09-13 PROCEDURE — 83735 ASSAY OF MAGNESIUM: CPT

## 2020-09-13 PROCEDURE — 770020 HCHG ROOM/CARE - TELE (206)

## 2020-09-13 PROCEDURE — 51798 US URINE CAPACITY MEASURE: CPT

## 2020-09-13 PROCEDURE — 96376 TX/PRO/DX INJ SAME DRUG ADON: CPT

## 2020-09-13 PROCEDURE — 700111 HCHG RX REV CODE 636 W/ 250 OVERRIDE (IP): Performed by: HOSPITALIST

## 2020-09-13 PROCEDURE — 99232 SBSQ HOSP IP/OBS MODERATE 35: CPT | Performed by: HOSPITALIST

## 2020-09-13 PROCEDURE — 92610 EVALUATE SWALLOWING FUNCTION: CPT

## 2020-09-13 RX ORDER — ASPIRIN 300 MG/1
300 SUPPOSITORY RECTAL DAILY
Status: DISCONTINUED | OUTPATIENT
Start: 2020-09-13 | End: 2020-09-14

## 2020-09-13 RX ORDER — LORAZEPAM 2 MG/ML
1 INJECTION INTRAMUSCULAR
Status: COMPLETED | OUTPATIENT
Start: 2020-09-13 | End: 2020-09-13

## 2020-09-13 RX ADMIN — LABETALOL HYDROCHLORIDE 10 MG: 5 INJECTION, SOLUTION INTRAVENOUS at 00:08

## 2020-09-13 RX ADMIN — ASPIRIN 300 MG: 300 SUPPOSITORY RECTAL at 10:44

## 2020-09-13 RX ADMIN — SODIUM CHLORIDE: 9 INJECTION, SOLUTION INTRAVENOUS at 23:30

## 2020-09-13 RX ADMIN — LORAZEPAM 1 MG: 2 INJECTION INTRAMUSCULAR; INTRAVENOUS at 10:57

## 2020-09-13 RX ADMIN — SODIUM CHLORIDE: 9 INJECTION, SOLUTION INTRAVENOUS at 09:00

## 2020-09-13 RX ADMIN — ACETAMINOPHEN 650 MG: 650 SUPPOSITORY RECTAL at 00:08

## 2020-09-13 RX ADMIN — LABETALOL HYDROCHLORIDE 10 MG: 5 INJECTION, SOLUTION INTRAVENOUS at 20:13

## 2020-09-13 ASSESSMENT — COGNITIVE AND FUNCTIONAL STATUS - GENERAL
MOVING FROM LYING ON BACK TO SITTING ON SIDE OF FLAT BED: A LOT
STANDING UP FROM CHAIR USING ARMS: A LOT
TURNING FROM BACK TO SIDE WHILE IN FLAT BAD: A LOT
MOBILITY SCORE: 11
CLIMB 3 TO 5 STEPS WITH RAILING: TOTAL
DRESSING REGULAR UPPER BODY CLOTHING: TOTAL
SUGGESTED CMS G CODE MODIFIER DAILY ACTIVITY: CN
PERSONAL GROOMING: TOTAL
DRESSING REGULAR LOWER BODY CLOTHING: TOTAL
TOILETING: TOTAL
EATING MEALS: TOTAL
DAILY ACTIVITIY SCORE: 6
HELP NEEDED FOR BATHING: TOTAL
SUGGESTED CMS G CODE MODIFIER MOBILITY: CL
WALKING IN HOSPITAL ROOM: A LOT
MOVING TO AND FROM BED TO CHAIR: A LOT

## 2020-09-13 ASSESSMENT — LIFESTYLE VARIABLES
HAVE YOU EVER FELT YOU SHOULD CUT DOWN ON YOUR DRINKING: NO
EVER HAD A DRINK FIRST THING IN THE MORNING TO STEADY YOUR NERVES TO GET RID OF A HANGOVER: NO
TOTAL SCORE: 0
AVERAGE NUMBER OF DAYS PER WEEK YOU HAVE A DRINK CONTAINING ALCOHOL: 6
TOTAL SCORE: 0
DOES PATIENT WANT TO STOP DRINKING: NO
TOTAL SCORE: 0
CONSUMPTION TOTAL: NEGATIVE
ON A TYPICAL DAY WHEN YOU DRINK ALCOHOL HOW MANY DRINKS DO YOU HAVE: 2
HOW MANY TIMES IN THE PAST YEAR HAVE YOU HAD 5 OR MORE DRINKS IN A DAY: 0
ALCOHOL_USE: YES
EVER FELT BAD OR GUILTY ABOUT YOUR DRINKING: NO
HAVE PEOPLE ANNOYED YOU BY CRITICIZING YOUR DRINKING: NO

## 2020-09-13 ASSESSMENT — FIBROSIS 4 INDEX: FIB4 SCORE: 1.13

## 2020-09-13 ASSESSMENT — PATIENT HEALTH QUESTIONNAIRE - PHQ9
SUM OF ALL RESPONSES TO PHQ9 QUESTIONS 1 AND 2: 0
2. FEELING DOWN, DEPRESSED, IRRITABLE, OR HOPELESS: NOT AT ALL
1. LITTLE INTEREST OR PLEASURE IN DOING THINGS: NOT AT ALL

## 2020-09-13 ASSESSMENT — PAIN DESCRIPTION - PAIN TYPE: TYPE: ACUTE PAIN

## 2020-09-13 NOTE — PROGRESS NOTES
(9/12) 1900: Report received from day-shift RN. Pt assessed and currently confused, restless, and non-verbal.     Pt's wife (Cynthia) at bedside. Plan of care discussed, MRI screening completed, admit profile completed to the extent possible with pt's spouse. Discussed pt's restlessness/impulsivity and possible need for soft wrist restraints if other less restrictive options prove ineffective. Pt's wife verbalized understanding and all questions/concerns were addressed.

## 2020-09-13 NOTE — PROGRESS NOTES
[facility] Medicine Daily Progress Note    Date of Service  9/13/2020    Chief Complaint  75 y.o. male admitted 9/12/2020 with altered mental status.     Hospital Course    75 y.o. male who presented 9/12/2020 with altered mental status.  Apparently the patient was transferred for evaluation of altered mental status that started this morning progressively got worse was associated with some right-sided weakness that started around 11:05 a.m..  The patient was transferred as a code stroke he was evaluated by neurology and felt that his symptoms are likely related to hypertensive and possibly metabolic encephalopathy      Interval Problem Update  Continued confusion, patient is aphasic   Follows commands  VS stable   No acute events overnight   Remains in soft restraints     Consultants/Specialty  neuro    Code Status  Full Code    Disposition  TBD    Review of Systems  Review of Systems   Unable to perform ROS: Mental status change        Physical Exam  Temp:  [36.2 °C (97.2 °F)-38.1 °C (100.6 °F)] 36.2 °C (97.2 °F)  Pulse:  [70-99] 78  Resp:  [10-22] 20  BP: (137-232)/() 154/72  SpO2:  [90 %-95 %] 94 %    Physical Exam  Vitals signs reviewed.   Constitutional:       General: He is not in acute distress.     Appearance: He is ill-appearing.   HENT:      Head: Normocephalic and atraumatic.      Nose: No congestion.      Mouth/Throat:      Mouth: Mucous membranes are dry.   Eyes:      Extraocular Movements: Extraocular movements intact.      Pupils: Pupils are equal, round, and reactive to light.   Neck:      Musculoskeletal: Neck supple.   Cardiovascular:      Rate and Rhythm: Normal rate and regular rhythm.      Pulses: Normal pulses.      Heart sounds: Normal heart sounds.   Pulmonary:      Effort: Pulmonary effort is normal. No respiratory distress.      Breath sounds: Normal breath sounds. No wheezing.   Abdominal:      General: Bowel sounds are normal. There is no distension.      Palpations: Abdomen is soft.       Tenderness: There is no abdominal tenderness.   Musculoskeletal:         General: No swelling.   Skin:     General: Skin is warm and dry.      Capillary Refill: Capillary refill takes less than 2 seconds.   Neurological:      Mental Status: He is alert.      Comments: Mild right sided weakness, aphasic, follows minimal commands. Withdrawals to pain.    Psychiatric:      Comments: Unable to determine due to mental status changes         Fluids    Intake/Output Summary (Last 24 hours) at 9/13/2020 1025  Last data filed at 9/13/2020 0500  Gross per 24 hour   Intake 1087.5 ml   Output 400 ml   Net 687.5 ml       Laboratory  Recent Labs     09/12/20  1237 09/13/20  0306   WBC 7.2 10.4   RBC 3.68* 3.80*   HEMOGLOBIN 13.2* 13.7*   HEMATOCRIT 36.7* 38.3*   MCV 99.7* 100.8*   MCH 35.9* 36.1*   MCHC 36.0* 35.8*   RDW 46.3 46.5   PLATELETCT 292 346   MPV 9.4 9.5     Recent Labs     09/12/20  1237 09/12/20  1811 09/13/20  0306   SODIUM 126* 126* 127*   POTASSIUM 3.5* 3.4* 3.4*   CHLORIDE 84* 83* 83*   CO2 28 25 28   GLUCOSE 115* 125* 133*   BUN 12 10 12   CREATININE 0.71 0.56 0.58   CALCIUM 10.1 10.0 9.7     Recent Labs     09/12/20  1237   APTT 33.8   INR 1.21*               Imaging  DX-THORACOLUMBAR SPINE-2 VIEWS   Final Result      1.  No acute fracture or dislocation identified.      2.  Degenerative changes are noted.      DX-CHEST-PORTABLE (1 VIEW)   Final Result      1.  Cardiomegaly.      2.  Mild bilateral patchy pulmonary infiltrates.      CT-CTA NECK WITH & W/O-POST PROCESSING   Final Result      1.  Atherosclerotic plaque involving the common and internal carotid arteries bilaterally most prominently seen at the bifurcations. This results in less than 50% diameter narrowing bilaterally.      2.  Atherosclerotic plaque involving the vertebral arteries bilaterally. No evidence of occlusion.      CT-CEREBRAL PERFUSION ANALYSIS   Final Result      1.  Cerebral blood flow less than 30% likely representing completed  infarct = 0 mL.      2.  T Max more than 6 seconds likely representing combination of completed infarct and ischemia = 267 mL.      3.  Mismatched volume likely representing ischemic brain/penumbra = 267 mL      4.  Please note that the cerebral perfusion was performed on the limited brain tissue around the basal ganglia region. Infarct/ischemia outside the CT perfusion sections can be missed in this study.      CT-CTA HEAD WITH & W/O-POST PROCESS   Final Result      No evidence of intracranial vascular occlusion or aneurysm.      CT-HEAD W/O   Final Result         NO ACUTE ABNORMALITIES ARE NOTED ON CT SCAN OF THE HEAD.      Findings are consistent with atrophy.  Decreased attenuation in the periventricular white matter likely indicates microvascular ischemic disease.      MR-BRAIN-W/O    (Results Pending)        Assessment/Plan  * Acute encephalopathy  Assessment & Plan  Etiology is not entirely clear patient evaluated by neurology not felt to be a TPA candidate  Possible hypertensive encephalopathy and some component of metabolic encephalopathy  CT head and CTA head and neck reviewed  Mild temp but no evidence of acute infection, check procal   We will check MRI of brain, brain mri pending  Cont with Prn BP medications   Needs pt/ot/speech eval  Hyponatremic, cont with IVF as this may be contributing to his altered mental status  May need EEG if no improvement    Hypokalemia  Assessment & Plan  Replete and monitor    Dyslipidemia  Assessment & Plan  Continue simvastatin    Back pain  Assessment & Plan  Given recent fall will check x-ray    Paroxysmal atrial fibrillation (HCC)  Assessment & Plan  Continue Eliquis and sotalol    Hyponatremia  Assessment & Plan  Possibly contributing to his acute encephalopathy  Check urine and serum osmolality        VTE prophylaxis: eliquis

## 2020-09-13 NOTE — CARE PLAN
Problem: Infection  Goal: Will remain free from infection  Outcome: PROGRESSING AS EXPECTED     Problem: Knowledge Deficit  Goal: Knowledge of disease process/condition, treatment plan, diagnostic tests, and medications will improve  Outcome: PROGRESSING AS EXPECTED

## 2020-09-13 NOTE — THERAPY
"Speech Language Pathology   Clinical Swallow Evaluation     Patient Name: Jordan Alexander  AGE:  75 y.o., SEX:  male  Medical Record #: [id number]  Today's Date: 9/13/2020     Precautions  Precautions: Fall Risk, Swallow Precautions ( See Comments)  Comments: bilateral wrist restraints    Assessment    Patient is a 75 y.o. male admitted with AMS and right sided weakness. He was rule out CVA versus possible metabolic encephalopathy. MRI 9/13/20 reports no evidence of acute infarct, hemorrhage or mass lesion. No history on file and patient was unable to provide hx. He is currently confused and in bilateral wrist restraints.  Clinical swallow evaluation initiated. He said \"sounds good\" when I introduced myself and discussed plan for assessment of PO. This was the only verbal response he gave me. Oral motor evaluation was unable to be completed as he did not follow directions. I was able to look in his mouth briefly and he has no upper dentition. He consumed and swallowed one ice chip and one teaspoon of mildly thickened liquids. All other trials were declined or spit out. Initially, he spit out phlegm but then spit out further trials of ice, thick liquid and taste of pudding. What he did swallow (x1 ice, x1 tsp thick liquid) he had weak laryngeal elevation but no overt signs of aspiration. However, given his confusion and limited intake I cannot recommend a diet nor oral route for medication at this time. RN aware    Plan  1) Continue NPO. Reassessment tomorrow vs Juanita    Recommend Speech Therapy 3 times per week until therapy goals are met for the following treatments:  Dysphagia Training and Patient / Family / Caregiver Education.    Discharge Recommendations: Recommend post-acute placement for additional speech therapy services prior to discharge home    Objective       09/13/20 1411   Prior Living Situation   Prior Services Unable To Determine At This Time   Housing / Facility Unable To Determine At This Time   Lives " with - Patient's Self Care Capacity Spouse   Prior Level Of Function   Communication Unknown   Swallow Unknown   Dentition   (no upper teeth)   Dentures   (unknown)   Hearing Within Functional Limits for Evaluation   Vision Within Functional Limits for Evaluation   Patient's Primary Language English   Occupation (Pre-Hospital Vocational) Retired Due To Age   Oral Motor Eval    Is Patient Able to Complete Oral Motor Eval No   Barriers To Oral Feeding   Barriers To Oral Feeding Impaired Cognition / Attention   Pre-Feeding Oral Stimulation Trial Intact   Laryngeal Function   Voice Quality Within Functional Limits   Volutional Cough   (not following direction)   Excursion Upon Swallow Weak    Oral Food Presentation   Ice Chips Within Functional Limits  (x1)   Single Swallow Mildly Thick (2) - (Nectar Thick)  Within Functional Limits  (x1)   Single Swallow Thin (0) Not Tested   Pureed (4)   (spit out initial bolus)   Regular (7) Not Tested   Self Feeding Not Tested   Dysphagia Strategies / Recommendations   Strategies / Interventions Recommended (Yes / No) Yes   Compensatory Strategies To Be Assessed   Diet / Liquid Recommendation NPO   Medication Administration    (defer to MD. unable to establish oral route today)   Therapy Interventions Dysphagia Therapy By Speech Language Pathologist   Dysphagia Rating   Nutritional Liquid Intake Rating Scale Nothing by mouth   Nutritional Food Intake Rating Scale Nothing by mouth   Patient / Family Goals   Patient / Family Goal #1 unable to generate   Short Term Goals   Short Term Goal # 1 The patient will consume prefeeding trials without signs of aspiration or oral confusion, given moderate cueing

## 2020-09-13 NOTE — CARE PLAN
Problem: Safety  Goal: Will remain free from injury  Outcome: PROGRESSING AS EXPECTED  Goal: Will remain free from falls  Outcome: PROGRESSING AS EXPECTED  Note: Universal safety precautions and hourly rounding in place. No falls during shift.      Problem: Safety - Medical Restraint  Goal: Remains free of injury from restraints (Restraint for Interference with Medical Device)  Description: INTERVENTIONS:  1. Determine that other, less restrictive measures have been tried or would not be effective before applying the restraint  2. Evaluate the patient's condition at the time of restraint application  3. Inform patient/family regarding the reason for restraint  4. Q2H: Monitor safety, psychosocial status, comfort, nutrition and hydration  Outcome: PROGRESSING AS EXPECTED

## 2020-09-13 NOTE — PROGRESS NOTES
Monitor Summary: SR 84-91, SC .24, QRS .08, QT .40 with occasional PVC,rare PAC per strip from monitor room

## 2020-09-13 NOTE — PROGRESS NOTES
With patient’s permission, completed daily phone call to designated support person, John Discussed patient condition and plan of care. All questions answered.

## 2020-09-14 ENCOUNTER — APPOINTMENT (OUTPATIENT)
Dept: RADIOLOGY | Facility: MEDICAL CENTER | Age: 75
DRG: 643 | End: 2020-09-14
Attending: HOSPITALIST
Payer: MEDICARE

## 2020-09-14 LAB
ANION GAP SERPL CALC-SCNC: 14 MMOL/L (ref 7–16)
BUN SERPL-MCNC: 19 MG/DL (ref 8–22)
CALCIUM SERPL-MCNC: 9.1 MG/DL (ref 8.5–10.5)
CHLORIDE SERPL-SCNC: 88 MMOL/L (ref 96–112)
CHLORIDE UR-SCNC: 162 MMOL/L
CO2 SERPL-SCNC: 24 MMOL/L (ref 20–33)
CREAT SERPL-MCNC: 0.45 MG/DL (ref 0.5–1.4)
CREAT UR-MCNC: 58.83 MG/DL
CRP SERPL HS-MCNC: 2.5 MG/DL (ref 0–0.75)
GLUCOSE SERPL-MCNC: 118 MG/DL (ref 65–99)
HIV 1+2 AB+HIV1 P24 AG SERPL QL IA: NORMAL
OSMOLALITY UR: 662 MOSM/KG H2O (ref 300–900)
POTASSIUM SERPL-SCNC: 3.2 MMOL/L (ref 3.6–5.5)
PREALB SERPL-MCNC: 17.9 MG/DL (ref 18–38)
SODIUM SERPL-SCNC: 126 MMOL/L (ref 135–145)
SODIUM UR-SCNC: 170 MMOL/L
TREPONEMA PALLIDUM IGG+IGM AB [PRESENCE] IN SERUM OR PLASMA BY IMMUNOASSAY: NORMAL
TSH SERPL DL<=0.005 MIU/L-ACNC: 0.57 UIU/ML (ref 0.38–5.33)
VIT B12 SERPL-MCNC: 316 PG/ML (ref 211–911)

## 2020-09-14 PROCEDURE — G0475 HIV COMBINATION ASSAY: HCPCS

## 2020-09-14 PROCEDURE — 4A10X4Z MONITORING OF CENTRAL NERVOUS ELECTRICAL ACTIVITY, EXTERNAL APPROACH: ICD-10-PCS | Performed by: PSYCHIATRY & NEUROLOGY

## 2020-09-14 PROCEDURE — 86140 C-REACTIVE PROTEIN: CPT

## 2020-09-14 PROCEDURE — 97162 PT EVAL MOD COMPLEX 30 MIN: CPT

## 2020-09-14 PROCEDURE — 700105 HCHG RX REV CODE 258: Performed by: HOSPITALIST

## 2020-09-14 PROCEDURE — 302136 NUTRITION PUMP: Performed by: HOSPITALIST

## 2020-09-14 PROCEDURE — 82570 ASSAY OF URINE CREATININE: CPT

## 2020-09-14 PROCEDURE — 95816 EEG AWAKE AND DROWSY: CPT | Mod: 26 | Performed by: PSYCHIATRY & NEUROLOGY

## 2020-09-14 PROCEDURE — 84300 ASSAY OF URINE SODIUM: CPT

## 2020-09-14 PROCEDURE — 80048 BASIC METABOLIC PNL TOTAL CA: CPT

## 2020-09-14 PROCEDURE — 82436 ASSAY OF URINE CHLORIDE: CPT

## 2020-09-14 PROCEDURE — 700102 HCHG RX REV CODE 250 W/ 637 OVERRIDE(OP): Performed by: HOSPITALIST

## 2020-09-14 PROCEDURE — A9270 NON-COVERED ITEM OR SERVICE: HCPCS | Performed by: HOSPITALIST

## 2020-09-14 PROCEDURE — 97166 OT EVAL MOD COMPLEX 45 MIN: CPT

## 2020-09-14 PROCEDURE — 86780 TREPONEMA PALLIDUM: CPT

## 2020-09-14 PROCEDURE — 92526 ORAL FUNCTION THERAPY: CPT

## 2020-09-14 PROCEDURE — 700111 HCHG RX REV CODE 636 W/ 250 OVERRIDE (IP): Performed by: HOSPITALIST

## 2020-09-14 PROCEDURE — 770020 HCHG ROOM/CARE - TELE (206)

## 2020-09-14 PROCEDURE — 99232 SBSQ HOSP IP/OBS MODERATE 35: CPT | Performed by: HOSPITALIST

## 2020-09-14 PROCEDURE — 84134 ASSAY OF PREALBUMIN: CPT

## 2020-09-14 PROCEDURE — 95816 EEG AWAKE AND DROWSY: CPT | Performed by: PSYCHIATRY & NEUROLOGY

## 2020-09-14 PROCEDURE — 36415 COLL VENOUS BLD VENIPUNCTURE: CPT

## 2020-09-14 PROCEDURE — 83935 ASSAY OF URINE OSMOLALITY: CPT

## 2020-09-14 PROCEDURE — 84443 ASSAY THYROID STIM HORMONE: CPT

## 2020-09-14 PROCEDURE — 82607 VITAMIN B-12: CPT

## 2020-09-14 RX ORDER — MONTELUKAST SODIUM 10 MG/1
10 TABLET ORAL DAILY
Status: DISCONTINUED | OUTPATIENT
Start: 2020-09-15 | End: 2020-09-20

## 2020-09-14 RX ORDER — POTASSIUM CHLORIDE 7.45 MG/ML
10 INJECTION INTRAVENOUS
Status: COMPLETED | OUTPATIENT
Start: 2020-09-14 | End: 2020-09-14

## 2020-09-14 RX ORDER — ACETAMINOPHEN 500 MG
500 TABLET ORAL 4 TIMES DAILY
Status: DISCONTINUED | OUTPATIENT
Start: 2020-09-14 | End: 2020-09-20

## 2020-09-14 RX ORDER — SOTALOL HYDROCHLORIDE 80 MG/1
80 TABLET ORAL EVERY MORNING
Status: DISCONTINUED | OUTPATIENT
Start: 2020-09-15 | End: 2020-09-20

## 2020-09-14 RX ORDER — SIMVASTATIN 20 MG
20 TABLET ORAL EVERY EVENING
Status: DISCONTINUED | OUTPATIENT
Start: 2020-09-14 | End: 2020-09-20

## 2020-09-14 RX ORDER — AMOXICILLIN 250 MG
2 CAPSULE ORAL 2 TIMES DAILY
Status: DISCONTINUED | OUTPATIENT
Start: 2020-09-14 | End: 2020-09-20

## 2020-09-14 RX ORDER — BISACODYL 10 MG
10 SUPPOSITORY, RECTAL RECTAL
Status: DISCONTINUED | OUTPATIENT
Start: 2020-09-14 | End: 2020-09-20

## 2020-09-14 RX ORDER — MAGNESIUM SULFATE HEPTAHYDRATE 40 MG/ML
4 INJECTION, SOLUTION INTRAVENOUS ONCE
Status: COMPLETED | OUTPATIENT
Start: 2020-09-14 | End: 2020-09-14

## 2020-09-14 RX ORDER — POLYETHYLENE GLYCOL 3350 17 G/17G
1 POWDER, FOR SOLUTION ORAL
Status: DISCONTINUED | OUTPATIENT
Start: 2020-09-14 | End: 2020-09-20

## 2020-09-14 RX ORDER — SOTALOL HYDROCHLORIDE 120 MG/1
120 TABLET ORAL EVERY EVENING
Status: DISCONTINUED | OUTPATIENT
Start: 2020-09-14 | End: 2020-09-20

## 2020-09-14 RX ADMIN — APIXABAN 5 MG: 5 TABLET, FILM COATED ORAL at 18:36

## 2020-09-14 RX ADMIN — MAGNESIUM SULFATE IN WATER 4 G: 40 INJECTION, SOLUTION INTRAVENOUS at 15:52

## 2020-09-14 RX ADMIN — SOTALOL HYDROCHLORIDE 120 MG: 120 TABLET ORAL at 18:39

## 2020-09-14 RX ADMIN — SODIUM CHLORIDE: 9 INJECTION, SOLUTION INTRAVENOUS at 12:07

## 2020-09-14 RX ADMIN — SIMVASTATIN 20 MG: 20 TABLET, FILM COATED ORAL at 18:36

## 2020-09-14 RX ADMIN — ACETAMINOPHEN 500 MG: 500 TABLET ORAL at 23:31

## 2020-09-14 RX ADMIN — DOCUSATE SODIUM 50 MG AND SENNOSIDES 8.6 MG 2 TABLET: 8.6; 5 TABLET, FILM COATED ORAL at 18:35

## 2020-09-14 RX ADMIN — POTASSIUM BICARBONATE 50 MEQ: 978 TABLET, EFFERVESCENT ORAL at 18:36

## 2020-09-14 RX ADMIN — ACETAMINOPHEN 500 MG: 500 TABLET ORAL at 18:36

## 2020-09-14 RX ADMIN — POTASSIUM CHLORIDE 10 MEQ: 7.46 INJECTION, SOLUTION INTRAVENOUS at 16:30

## 2020-09-14 RX ADMIN — METOPROLOL TARTRATE 12.5 MG: 25 TABLET, FILM COATED ORAL at 18:35

## 2020-09-14 RX ADMIN — POTASSIUM CHLORIDE 10 MEQ: 7.46 INJECTION, SOLUTION INTRAVENOUS at 14:45

## 2020-09-14 RX ADMIN — ASPIRIN 300 MG: 300 SUPPOSITORY RECTAL at 05:04

## 2020-09-14 ASSESSMENT — COGNITIVE AND FUNCTIONAL STATUS - GENERAL
SUGGESTED CMS G CODE MODIFIER DAILY ACTIVITY: CN
TURNING FROM BACK TO SIDE WHILE IN FLAT BAD: A LOT
CLIMB 3 TO 5 STEPS WITH RAILING: TOTAL
MOBILITY SCORE: 8
TOILETING: TOTAL
SUGGESTED CMS G CODE MODIFIER MOBILITY: CM
HELP NEEDED FOR BATHING: TOTAL
EATING MEALS: TOTAL
PERSONAL GROOMING: TOTAL
MOVING FROM LYING ON BACK TO SITTING ON SIDE OF FLAT BED: UNABLE
MOVING TO AND FROM BED TO CHAIR: A LOT
STANDING UP FROM CHAIR USING ARMS: TOTAL
DRESSING REGULAR LOWER BODY CLOTHING: TOTAL
DAILY ACTIVITIY SCORE: 6
DRESSING REGULAR UPPER BODY CLOTHING: TOTAL
WALKING IN HOSPITAL ROOM: TOTAL

## 2020-09-14 ASSESSMENT — PAIN DESCRIPTION - PAIN TYPE
TYPE: ACUTE PAIN;CHRONIC PAIN
TYPE: ACUTE PAIN;CHRONIC PAIN

## 2020-09-14 ASSESSMENT — GAIT ASSESSMENTS: GAIT LEVEL OF ASSIST: UNABLE TO PARTICIPATE

## 2020-09-14 NOTE — THERAPY
"Occupational Therapy   Initial Evaluation     Patient Name: Jordan Alexander  Age:  75 y.o., Sex:  male  Medical Record #: [id number]  Today's Date: 9/14/2020     Precautions  Precautions: Fall Risk, Swallow Precautions ( See Comments)  Comments: B wrist restraints     Assessment  Patient is 75 y.o. male presented to the hospital with altered mental status. Pt with possible metabolic encephalopathy. Pt's MRI was negative for acute stroke. Pt had a fall 10 days prior to admit. Pt with aphasia, unable to follow any directions during OT eval and appeared to not understand what was being said. When handed familiar grooming items, he would grasp the items but would not attempt to use them in any way. He was able to maintain his balance seated edge of bed and was able to bear weight on both LEs in standing but was unable to shift his weight to initiate a transfer. Pt with impairments in cognition and language significantly impacting his ability to complete ADLs and ADL transfers.         Plan    Recommend Occupational Therapy 3 times per week until therapy goals are met for the following treatments:  Adaptive Equipment, Cognitive Skill Development, Neuro Re-Education / Balance, Self Care/Activities of Daily Living, Therapeutic Activities and Therapeutic Exercises.    DC Equipment Recommendations: Unable to determine at this time  Discharge Recommendations: Recommend post-acute placement for additional occupational therapy services prior to discharge home     Subjective    \"That's enough\"      Objective       09/14/20 4089   Prior Living Situation   Lives with - Patient's Self Care Capacity Spouse  (Erasmo)   Comments Pt unable to provide report on his PLOF.    Cognition    Comments Pt was not able to follow any directions. He would turn to look at the person speaking but was unable to follow any motor directions. He did not recognize any grooming objects in order to use them. He did have some automatic utterances such as \"okay\" " "\"what\" \"That's enough\" and a few curse words.    Active ROM Upper Body   Comments Pt did not move either UE through full ROM and was unable to follow directions to fully assess range. When his UEs were moved, he was able to hold them against gravity    Strength Upper Body   Comments moving BUE equally to reach up and scratch his nose. He demonstrated functional strength in BUEs when he resisted attempts at hand over hand assist to wash his face or comb his hair by pushing this therapist away    Balance Assessment   Comments no assist required for static sitting balance. Stood with assist from the therapist    Bed Mobility    Supine to Sit Maximal Assist   Sit to Supine Maximal Assist   Comments Pt unable to follow any commands to initiate bed mobility    ADL Assessment   Eating   (NPO)   Grooming Total Assist;Seated   Lower Body Dressing Total Assist   Toileting Total Assist   Comments pt was unable to initiate ADLs and was resistant to hand over hand assist. Despite having adequate strength and ROM he required total assist for all basic ADLs due to impaired cognition    Functional Mobility   Sit to Stand Maximal Assist   Mobility Pt required max assist to initiate standing but was then able to maintain standing with min assist for balance. He was able to bear weight through BLEs   Visual Perception   Comments visually following people as they moved through the room    Short Term Goals    Short Term Goal #1 Pt will groom seated with Jo-Ann   Short Term Goal #2 Pt will complete ADL transfers with Jo-Ann   Short Term Goal #3 Pt will dress LB with Jo-Ann                 "

## 2020-09-14 NOTE — PROGRESS NOTES
Monitor Summary: SR 73-84, NV 0.22, QRS 0.08, QT 0.44, with rare PVCs per strip from monitor room.

## 2020-09-14 NOTE — PROGRESS NOTES
Cortrak Placement    Tube Team verified patient name and medical record number prior to tube placement.  Cortrak tube (43 inches, 12 Spanish) placed at 60 cm in left nare.  Per Cortrak picture, tube appears to be in the stomach.  Nursing Instructions: Awaiting KUB to confirm placement before use for medications or feeding. Once placement confirmed, flush tube with 30 ml of water, and then remove and save stylet, in patient medication drawer.

## 2020-09-14 NOTE — PROGRESS NOTES
Monitor Summary: SR 77-95, MO .20, QRS .08, QT .40 with rare PVC & PAC per strip from monitor room

## 2020-09-14 NOTE — PROGRESS NOTES
[facility] Medicine Daily Progress Note    Date of Service  9/14/2020    Chief Complaint  75 y.o. male admitted 9/12/2020 with altered mental status.     Hospital Course    75 y.o. male who presented 9/12/2020 with altered mental status.  Apparently the patient was transferred for evaluation of altered mental status that started this morning progressively got worse was associated with some right-sided weakness that started around 11:05 a.m..  The patient was transferred as a code stroke he was evaluated by neurology and felt that his symptoms are likely related to hypertensive and possibly metabolic encephalopathy      Interval Problem Update  Continued confusion, patient does have some aphasia but is improving slowly   Follows commands  VS stable   No acute events overnight   Remains in soft restraints     Consultants/Specialty  neuro    Code Status  Full Code    Disposition  TBD    Review of Systems  Review of Systems   Unable to perform ROS: Mental status change        Physical Exam  Temp:  [35.9 °C (96.6 °F)-36.9 °C (98.4 °F)] 36.4 °C (97.6 °F)  Pulse:  [60-94] 74  Resp:  [16-20] 16  BP: (142-181)/(70-86) 158/78  SpO2:  [90 %-95 %] 95 %    Physical Exam  Vitals signs reviewed.   Constitutional:       General: He is not in acute distress.     Appearance: He is ill-appearing.   HENT:      Head: Normocephalic and atraumatic.      Nose: No congestion.      Mouth/Throat:      Mouth: Mucous membranes are dry.   Eyes:      Extraocular Movements: Extraocular movements intact.      Pupils: Pupils are equal, round, and reactive to light.   Neck:      Musculoskeletal: Neck supple.   Cardiovascular:      Rate and Rhythm: Normal rate and regular rhythm.      Pulses: Normal pulses.      Heart sounds: Normal heart sounds.   Pulmonary:      Effort: Pulmonary effort is normal. No respiratory distress.      Breath sounds: Normal breath sounds. No wheezing.   Abdominal:      General: Bowel sounds are normal. There is no distension.       Palpations: Abdomen is soft.      Tenderness: There is no abdominal tenderness.   Musculoskeletal:         General: No swelling.   Skin:     General: Skin is warm and dry.      Capillary Refill: Capillary refill takes less than 2 seconds.   Neurological:      Mental Status: He is alert.      Comments: , aphasic, follows minimal commands. Withdrawals to pain.    Psychiatric:      Comments: Unable to determine due to mental status changes         Fluids    Intake/Output Summary (Last 24 hours) at 9/14/2020 1211  Last data filed at 9/14/2020 0330  Gross per 24 hour   Intake --   Output 500 ml   Net -500 ml       Laboratory  Recent Labs     09/12/20  1237 09/13/20  0306   WBC 7.2 10.4   RBC 3.68* 3.80*   HEMOGLOBIN 13.2* 13.7*   HEMATOCRIT 36.7* 38.3*   MCV 99.7* 100.8*   MCH 35.9* 36.1*   MCHC 36.0* 35.8*   RDW 46.3 46.5   PLATELETCT 292 346   MPV 9.4 9.5     Recent Labs     09/12/20  1811 09/13/20  0306 09/14/20  1044   SODIUM 126* 127* 126*   POTASSIUM 3.4* 3.4* 3.2*   CHLORIDE 83* 83* 88*   CO2 25 28 24   GLUCOSE 125* 133* 118*   BUN 10 12 19   CREATININE 0.56 0.58 0.45*   CALCIUM 10.0 9.7 9.1     Recent Labs     09/12/20  1237   APTT 33.8   INR 1.21*               Imaging  MR-BRAIN-W/O   Final Result      1.  No evidence of acute territorial infarct, intracranial hemorrhage or mass lesion.   2.  Moderate diffuse cerebral substance loss.   3.  Moderate microangiopathic ischemic change versus demyelination or gliosis.   4.  Chronic ischemic pontine gliosis.   5.  Small focus of chronic hemosiderin deposition within the left inferior frontal lobe likely related to remote hemorrhagic infarct or trauma.      DX-THORACOLUMBAR SPINE-2 VIEWS   Final Result      1.  No acute fracture or dislocation identified.      2.  Degenerative changes are noted.      DX-CHEST-PORTABLE (1 VIEW)   Final Result      1.  Cardiomegaly.      2.  Mild bilateral patchy pulmonary infiltrates.      CT-CTA NECK WITH & W/O-POST PROCESSING    Final Result      1.  Atherosclerotic plaque involving the common and internal carotid arteries bilaterally most prominently seen at the bifurcations. This results in less than 50% diameter narrowing bilaterally.      2.  Atherosclerotic plaque involving the vertebral arteries bilaterally. No evidence of occlusion.      CT-CEREBRAL PERFUSION ANALYSIS   Final Result      1.  Cerebral blood flow less than 30% likely representing completed infarct = 0 mL.      2.  T Max more than 6 seconds likely representing combination of completed infarct and ischemia = 267 mL.      3.  Mismatched volume likely representing ischemic brain/penumbra = 267 mL      4.  Please note that the cerebral perfusion was performed on the limited brain tissue around the basal ganglia region. Infarct/ischemia outside the CT perfusion sections can be missed in this study.      CT-CTA HEAD WITH & W/O-POST PROCESS   Final Result      No evidence of intracranial vascular occlusion or aneurysm.      CT-HEAD W/O   Final Result         NO ACUTE ABNORMALITIES ARE NOTED ON CT SCAN OF THE HEAD.      Findings are consistent with atrophy.  Decreased attenuation in the periventricular white matter likely indicates microvascular ischemic disease.           Assessment/Plan  * Acute encephalopathy  Assessment & Plan  Etiology is not entirely clear patient evaluated by neurology not felt to be a TPA candidate  Possible hypertensive encephalopathy and some component of metabolic encephalopathy  CT head and CTA head and neck reviewed  Mild temp but no evidence of acute infection, check procal   We will check MRI of brain which is unremarkable   Not polypharmacy given med list  Cont with Prn BP medications   Needs pt/ot/speech eval  Hyponatremic, cont with IVF as this may be contributing to his altered mental status  Check eeg which has been ordered today     Hyponatremia  Assessment & Plan  Possibly contributing to his acute encephalopathy  Low serum osm, likely  hypovolemic   Check urine lytes and urine osmo  Cont with IVF increase rate slightly   reheck labs in am     Hypokalemia  Assessment & Plan  Replete and monitor    Dyslipidemia  Assessment & Plan  Continue simvastatin    Back pain  Assessment & Plan  unremarkbale on xray    Paroxysmal atrial fibrillation (HCC)  Assessment & Plan  Continue Eliquis and sotalol       VTE prophylaxis: eliquis

## 2020-09-14 NOTE — PROGRESS NOTES
attention order for heel float boot's our department does not carry this item. You will need to order from patient supply.

## 2020-09-14 NOTE — PROGRESS NOTES
With patient’s permission (if able), completed daily phone call to designated support person, Erasmo  Discussed patient condition and plan of care. All questions answered.

## 2020-09-14 NOTE — CARE PLAN
Problem: Psychosocial Needs:  Goal: Level of anxiety will decrease  Outcome: PROGRESSING AS EXPECTED     Problem: Safety  Goal: Will remain free from falls  Outcome: PROGRESSING AS EXPECTED     Problem: Venous Thromboembolism (VTW)/Deep Vein Thrombosis (DVT) Prevention:  Goal: Patient will participate in Venous Thrombosis (VTE)/Deep Vein Thrombosis (DVT)Prevention Measures  Outcome: PROGRESSING AS EXPECTED  Flowsheets (Taken 9/13/2020 4703)  Mechanical Prophylaxis: SCDs, Sequential Compression Device  SCDs, Sequential Compression Device: On

## 2020-09-14 NOTE — THERAPY
Physical Therapy   Initial Evaluation     Patient Name: Jordan Alexander  Age:  75 y.o., Sex:  male  Medical Record #: [id number]  Today's Date: 9/14/2020     Precautions: Fall Risk, Swallow Precautions ( See Comments)    Assessment  Patient is 75 y.o. male admitted on 9/12/2020 with altered mental status.  Additional factors influencing patient status / progress: B LE weakness (R>L), global aphasia, impaired balance, decreased activity tolerance. PT will follow during acute stay.     Plan    Recommend Physical Therapy 3 times per week until therapy goals are met for the following treatments:  Bed Mobility, Gait Training, Neuro Re-Education / Balance, Therapeutic Activities and Therapeutic Exercises    DC Equipment Recommendations: Unable to determine at this time  Discharge Recommendations: Recommend post-acute placement for additional physical therapy services prior to discharge home       Subjective    Pt unable to speak, unable to follow one-step commands.     Objective       09/14/20 0835   Prior Living Situation   Prior Services Home-Independent   Housing / Facility Unable To Determine At This Time   Equipment Owned Unable to Determine At This Time   Lives with - Patient's Self Care Capacity Spouse   Prior Level of Functional Mobility   Bed Mobility Independent   Transfer Status Independent   Ambulation Independent   Assistive Devices Used Unable to Determine At This Time   Stairs Unable To Determine At This Time   Cognition    Speech/ Communication Unable to Communicate;Global Aphasia   Orientation Level Other (Comment)  (unable to assess)   Level of Consciousness Confused   Ability To Follow Commands Unable to Follow 1 Step Commands   Attention Impaired   Passive ROM Lower Body   Passive ROM Lower Body WDL   Active ROM Lower Body    Gross Active ROM Gross Active Range of Motion Impaired,  but Appears Adequate for Functional Mobility.   Strength Lower Body   Rt Hip Flexion Strength 0 (Zero)   Rt Knee Flexion  Strength 0 (Zero)   Rt Knee Extension Strength 0 (Zero)   Rt Ankle Dorsiflexion Strength 0 (Zero)   Rt Ankle Plantar Flexion Strength 0 (Zero)   Lt Hip Flexion Strength 2- (P-)   Lt Knee Flexion Strength 2- (P-)   Lt Knee Extension Strength 2- (P-)   Lt Ankle Dorsiflexion Strength 2- (P-)   Lt Ankle Plantar Flexion Strength 2- (P-)   Sensation Lower Body   Lower Extremity Sensation     (unable to assess)   Lower Body Muscle Tone   Rt Lower Extremity Muscle Tone Hypotonic;Non Functional   Vision   Vision Comments unable to assess   Balance Assessment   Sitting Balance (Static) Poor -   Sitting Balance (Dynamic) Trace +   Weight Shift Sitting Absent   Comments at EOB with MaxA   Gait Analysis   Gait Level Of Assist Unable to Participate   Bed Mobility    Supine to Sit Maximal Assist   Sit to Supine Maximal Assist   Scooting Total Assist   Functional Mobility   Sit to Stand Unable to Participate   Bed, Chair, Wheelchair Transfer Unable to Participate   Mobility supien<>EOB sitting   Activity Tolerance   Sitting Edge of Bed 3 min   Patient / Family Goals    Patient / Family Goal #1 None stated   Short Term Goals    Short Term Goal # 1 Pt will be SPV for supine<>sit at EOB in 6 txs to improve functional mobility.   Short Term Goal # 2 Pt will be SPV for EOB sitting x 10 min in 6txs to improve upright tolerance.   Short Term Goal # 3 Pt will be Rodolfo for transfers with LRAD in 6 txs to improve fucntional mobility.   Short Term Goal # 4 Pt will be Rodolfo for gait > 50' with LRAD in 6 txs to improve functional mobility.   Education Group   Role of Physical Therapist Patient Response Patient;Nonacceptance;Demonstration;Explanation;No Learning Evidence;Reinforcement Needed   Problem List    Problems Impaired Bed Mobility;Impaired Transfers;Impaired Ambulation;Functional Strength Deficit;Impaired Balance;Impaired Coordination;Decreased Activity Tolerance;Safety Awareness Deficits / Cognition;Motor Planning / Sequencing    Anticipated Discharge Equipment and Recommendations   DC Equipment Recommendations Unable to determine at this time   Discharge Recommendations Recommend post-acute placement for additional physical therapy services prior to discharge home

## 2020-09-14 NOTE — PROCEDURES
ROUTINE ELECTROENCEPHALOGRAM REPORT      Referring provider: Dr. Alberto.     DOS: 9/14/2020 (total recording of 28 minutes)    INDICATION:  Jordan Alexander 75 y.o. male presenting with altered mental status, right-sided weakness.    CURRENT ANTIEPILEPTIC REGIMEN: Lorazepam.    TECHNIQUE: 30 channel routine electroencephalogram (EEG) was performed in accordance with the international 10-20 system. The study was reviewed in bipolar and referential montages. The recording examined the patient during wakeful and drowsy state(s).     DESCRIPTION OF THE RECORD:  During the wakefulness, the background showed a symmetrical 7 hz theta activity posteriorly with amplitude of 70 mV on the right, with attenuation of the cerebral electrical activity on the left.  There was reactivity to eye closure/opening.  A normal anterior-posterior gradient was noted with faster beta frequencies seen anteriorly.  During drowsiness, theta/delta frequencies were seen.    ACTIVATION PROCEDURES:   Intermittent Photic stimulation was performed in a stepwise fashion from 1 to 30 Hz, but failed to produce any background changes.      ICTAL AND/OR INTERICTAL FINDINGS:   There was attenuation of the cerebral electrical activity on the left hemisphere.  No clinical events or seizures were reported or recorded during the study.     EKG: sampling of the EKG recording demonstrated sinus rhythm.       INTERPRETATION:  This is an abnormal routine EEG recording in the awake and drowsy states.  A mild encephalopathy is suggested, which is nonspecific.  There was attenuation of the cerebral electrical activity on the left hemisphere, to suggest an underlying structural abnormality.  No seizures captured during the study.  Clinical and radiological correlation is recommended.    Note: This EEG does not rule out epilepsy.  If the clinical suspicion remains high for seizures, a prolonged recording to capture clinical or subclinical events may be  helpful.        Sanford Killian MD   Epilepsy and Neurodiagnostics.   Clinical  of Neurology [facility].   Diplomate in Neurology, Epilepsy, and Electrodiagnostic Medicine.   Office: [phone]  Fax: [phone]

## 2020-09-14 NOTE — THERAPY
Speech Language Pathology  Daily Treatment     Patient Name: Jordan Alexander  Age:  75 y.o., Sex:  male  Medical Record #: [id number]  Today's Date: 9/14/2020     Precautions  Precautions: Fall Risk, Swallow Precautions ( See Comments)  Comments: global aphasia, R sided weakness    Assessment    Patient seen for dysphagia follow-up and is awake with s/s suggestive of a global type of aphasia with poor verbal output, no following of simple directives with use of multi-modalities by examiner.  Speech is limited to two single words and clearly voiced sounds which pt cannot repeat on demand.  Pt with hx of fall 1 week prior to admit.  MRI 9/13 revealed no acute intracerebral hemorrhage, moderate diffuse substance loss, and sm deposit L inferior frontal lobe likely related to remote infarct or trauma.   Nutritive pre-feeding trials of single ice chips, mildly thick liquid via tsp and cup, puree and thin liquids via 1/2 tsp were presented with variable delayed swallow trigger of 6-12 seconds to palpation.  Infrequent audible sound with swallow, concerning for penetration or aspiration.  Pt is unable to follow simple directives and presents with mild oral defensiveness to spoon presentation for all tastes.  At this time pt cannot eat to meet nutritional needs and presents with delayed swallow trigger concerning for descending penetration/aspiration risk.  Alternative source of nutrition is needed.  Additional diagnostic will be appropriate in the coming days.      Plan    Continue current treatment plan.   Recommend: 1) NPO, please consider cortrak for all nutrition and medications    2) non oral source med pass    3) FEES when SLP determines pt is appropriate (later this week)    4) SLP to follow    Discharge Recommendations: Recommend post-acute placement for additional speech therapy services prior to discharge home       Objective       09/14/20 0802   Non Verbal Descriptors   Non Verbal Scale  Calm   Verbal Expression    Comments Appears to have a global type of aphasia; is uttering sounds, 'yes' 'no' but not in repetition, without volitional control.   Auditory Comprehension   Comments not following simple directives with gesture or auditory directives.   Cognitive-Linguistic   Level of Consciousness   (appears confused)   Orientation Level   (pt does not indicate choice given choice of two; UTT)   Dysphagia    Dysphagia X   Diet / Liquid Recommendation NPO;Pre-Feeding Trials with SLP Only   Nutritional Liquid Intake Rating Scale Nothing by mouth   Nutritional Food Intake Rating Scale Nothing by mouth   Skilled Intervention Verbal Cueing;Tactile Cueing;Compensatory Strategies   Comments Inconsistently timed swallow with 6-12 second delay and unable to follow directives for protective maneuvers.  Risk of descending penetration/aspiration.     Recommended Route of Medication Administration   Medication Administration  Via Gastric Tube  (non-oral route currently recommended)   Patient / Family Goals   Patient / Family Goal #1 pt unable to formulate   Short Term Goals   Short Term Goal # 1 New 9/14: Pt will trigger a consistent swallow to palpation, within 3-5 seconds, working 1:1 with SLP during prefeeding trials.    Goal Outcome # 1 Progressing slower than expected   Short Term Goal # 2 Revised 9/14: The patient will consume prefeeding trials 1:1 with SLP without signs of aspiration or oral confusion, given moderate cueing   Education Group   Additional Comments Utilized gesture as well as spoken word; pt appears confused, not following   Interdisciplinary Plan of Care Collaboration   Collaboration Comments Pt needs alternative source of nutrition and med pass route currently    Session Information   Priority 4  (3x. Met enceph vs CVA. Global type aphasia. NPO/no nutrition)

## 2020-09-15 LAB
ALBUMIN SERPL BCP-MCNC: 3.8 G/DL (ref 3.2–4.9)
ALBUMIN/GLOB SERPL: 1.6 G/DL
ALP SERPL-CCNC: 70 U/L (ref 30–99)
ALT SERPL-CCNC: 11 U/L (ref 2–50)
ANION GAP SERPL CALC-SCNC: 12 MMOL/L (ref 7–16)
AST SERPL-CCNC: 15 U/L (ref 12–45)
BILIRUB SERPL-MCNC: 0.9 MG/DL (ref 0.1–1.5)
BUN SERPL-MCNC: 16 MG/DL (ref 8–22)
CALCIUM SERPL-MCNC: 9 MG/DL (ref 8.5–10.5)
CHLORIDE SERPL-SCNC: 89 MMOL/L (ref 96–112)
CO2 SERPL-SCNC: 28 MMOL/L (ref 20–33)
CREAT SERPL-MCNC: 0.43 MG/DL (ref 0.5–1.4)
CRP SERPL HS-MCNC: 1.28 MG/DL (ref 0–0.75)
GLOBULIN SER CALC-MCNC: 2.4 G/DL (ref 1.9–3.5)
GLUCOSE SERPL-MCNC: 135 MG/DL (ref 65–99)
POTASSIUM SERPL-SCNC: 3.2 MMOL/L (ref 3.6–5.5)
PREALB SERPL-MCNC: 15.3 MG/DL (ref 18–38)
PROT SERPL-MCNC: 6.2 G/DL (ref 6–8.2)
SODIUM SERPL-SCNC: 129 MMOL/L (ref 135–145)

## 2020-09-15 PROCEDURE — 84134 ASSAY OF PREALBUMIN: CPT

## 2020-09-15 PROCEDURE — A9270 NON-COVERED ITEM OR SERVICE: HCPCS | Performed by: HOSPITALIST

## 2020-09-15 PROCEDURE — 36415 COLL VENOUS BLD VENIPUNCTURE: CPT

## 2020-09-15 PROCEDURE — 700102 HCHG RX REV CODE 250 W/ 637 OVERRIDE(OP): Performed by: HOSPITALIST

## 2020-09-15 PROCEDURE — 700102 HCHG RX REV CODE 250 W/ 637 OVERRIDE(OP): Performed by: INTERNAL MEDICINE

## 2020-09-15 PROCEDURE — 86140 C-REACTIVE PROTEIN: CPT

## 2020-09-15 PROCEDURE — A9270 NON-COVERED ITEM OR SERVICE: HCPCS | Performed by: INTERNAL MEDICINE

## 2020-09-15 PROCEDURE — 770020 HCHG ROOM/CARE - TELE (206)

## 2020-09-15 PROCEDURE — 99232 SBSQ HOSP IP/OBS MODERATE 35: CPT | Performed by: INTERNAL MEDICINE

## 2020-09-15 PROCEDURE — 80053 COMPREHEN METABOLIC PANEL: CPT

## 2020-09-15 PROCEDURE — 700105 HCHG RX REV CODE 258: Performed by: HOSPITALIST

## 2020-09-15 PROCEDURE — 700111 HCHG RX REV CODE 636 W/ 250 OVERRIDE (IP): Performed by: INTERNAL MEDICINE

## 2020-09-15 RX ORDER — MAGNESIUM SULFATE HEPTAHYDRATE 40 MG/ML
2 INJECTION, SOLUTION INTRAVENOUS ONCE
Status: COMPLETED | OUTPATIENT
Start: 2020-09-15 | End: 2020-09-15

## 2020-09-15 RX ADMIN — ACETAMINOPHEN 500 MG: 500 TABLET ORAL at 12:21

## 2020-09-15 RX ADMIN — MONTELUKAST 10 MG: 10 TABLET, FILM COATED ORAL at 04:22

## 2020-09-15 RX ADMIN — SODIUM CHLORIDE: 9 INJECTION, SOLUTION INTRAVENOUS at 05:37

## 2020-09-15 RX ADMIN — METOPROLOL TARTRATE 12.5 MG: 25 TABLET, FILM COATED ORAL at 04:22

## 2020-09-15 RX ADMIN — SIMVASTATIN 20 MG: 20 TABLET, FILM COATED ORAL at 16:33

## 2020-09-15 RX ADMIN — APIXABAN 5 MG: 5 TABLET, FILM COATED ORAL at 16:33

## 2020-09-15 RX ADMIN — ACETAMINOPHEN 500 MG: 500 TABLET ORAL at 22:11

## 2020-09-15 RX ADMIN — ACETAMINOPHEN 500 MG: 500 TABLET ORAL at 08:57

## 2020-09-15 RX ADMIN — APIXABAN 5 MG: 5 TABLET, FILM COATED ORAL at 04:23

## 2020-09-15 RX ADMIN — SOTALOL HYDROCHLORIDE 80 MG: 80 TABLET ORAL at 04:23

## 2020-09-15 RX ADMIN — METOPROLOL TARTRATE 12.5 MG: 25 TABLET, FILM COATED ORAL at 16:33

## 2020-09-15 RX ADMIN — MAGNESIUM SULFATE IN WATER 2 G: 40 INJECTION, SOLUTION INTRAVENOUS at 16:33

## 2020-09-15 RX ADMIN — SODIUM CHLORIDE: 9 INJECTION, SOLUTION INTRAVENOUS at 22:11

## 2020-09-15 RX ADMIN — SODIUM CHLORIDE: 9 INJECTION, SOLUTION INTRAVENOUS at 12:21

## 2020-09-15 RX ADMIN — SOTALOL HYDROCHLORIDE 120 MG: 120 TABLET ORAL at 16:33

## 2020-09-15 RX ADMIN — POTASSIUM BICARBONATE 50 MEQ: 978 TABLET, EFFERVESCENT ORAL at 16:43

## 2020-09-15 RX ADMIN — ACETAMINOPHEN 500 MG: 500 TABLET ORAL at 16:33

## 2020-09-15 RX ADMIN — DOCUSATE SODIUM 50 MG AND SENNOSIDES 8.6 MG 2 TABLET: 8.6; 5 TABLET, FILM COATED ORAL at 04:23

## 2020-09-15 ASSESSMENT — PAIN DESCRIPTION - PAIN TYPE
TYPE: ACUTE PAIN;CHRONIC PAIN

## 2020-09-15 NOTE — CARE PLAN
Problem: Psychosocial Needs:  Goal: Level of anxiety will decrease  Outcome: PROGRESSING AS EXPECTED     Problem: Safety  Goal: Will remain free from injury  Outcome: PROGRESSING AS EXPECTED     Problem: Communication  Goal: The ability to communicate needs accurately and effectively will improve  Outcome: PROGRESSING SLOWER THAN EXPECTED

## 2020-09-15 NOTE — PROGRESS NOTES
Monitor Summary: SR 60 - 76, ME -.26, QRS -.10, QT -.44, with occasional PVC's and frequent PAC's per strip from monitor room.

## 2020-09-15 NOTE — PROGRESS NOTES
[facility] Medicine Daily Progress Note    Date of Service  9/15/2020    Chief Complaint  75 y.o. male admitted 9/12/2020 with altered mental status.     Hospital Course    75 y.o. male who presented 9/12/2020 with altered mental status.  Apparently the patient was transferred for evaluation of altered mental status that started this morning progressively got worse was associated with some right-sided weakness that started around 11:05 a.m..  The patient was transferred as a code stroke he was evaluated by neurology and felt that his symptoms are likely related to hypertensive and possibly metabolic encephalopathy.      Interval Problem Update  Continue restraints as pt keeps pulling at cortrak.  Continue electrolyte replacement.     Consultants/Specialty  neuro    Code Status  Full Code    Disposition  TBD    Review of Systems  Review of Systems   Unable to perform ROS: Mental status change        Physical Exam  Temp:  [36 °C (96.8 °F)-37 °C (98.6 °F)] 36.3 °C (97.3 °F)  Pulse:  [60-74] 62  Resp:  [16-20] 16  BP: (140-162)/(72-84) 158/74  SpO2:  [92 %-95 %] 94 %    Physical Exam  Vitals signs reviewed.   Constitutional:       General: He is not in acute distress.     Appearance: He is ill-appearing.   HENT:      Head: Normocephalic and atraumatic.      Nose: No congestion.      Mouth/Throat:      Mouth: Mucous membranes are dry.   Eyes:      Extraocular Movements: Extraocular movements intact.      Pupils: Pupils are equal, round, and reactive to light.   Neck:      Musculoskeletal: Neck supple.   Cardiovascular:      Rate and Rhythm: Normal rate and regular rhythm.      Pulses: Normal pulses.      Heart sounds: Normal heart sounds.   Pulmonary:      Effort: Pulmonary effort is normal. No respiratory distress.      Breath sounds: Normal breath sounds. No wheezing.   Abdominal:      General: Bowel sounds are normal. There is no distension.      Palpations: Abdomen is soft.      Tenderness: There is no abdominal  tenderness.   Musculoskeletal:         General: No swelling.   Skin:     General: Skin is warm and dry.      Capillary Refill: Capillary refill takes less than 2 seconds.   Neurological:      Mental Status: He is alert.      Comments: , aphasic, follows minimal commands. Withdrawals to pain.    Psychiatric:      Comments: Unable to determine due to mental status changes         Fluids    Intake/Output Summary (Last 24 hours) at 9/15/2020 0844  Last data filed at 9/15/2020 0600  Gross per 24 hour   Intake 1890 ml   Output 1050 ml   Net 840 ml       Laboratory  Recent Labs     09/12/20  1237 09/13/20  0306   WBC 7.2 10.4   RBC 3.68* 3.80*   HEMOGLOBIN 13.2* 13.7*   HEMATOCRIT 36.7* 38.3*   MCV 99.7* 100.8*   MCH 35.9* 36.1*   MCHC 36.0* 35.8*   RDW 46.3 46.5   PLATELETCT 292 346   MPV 9.4 9.5     Recent Labs     09/12/20  1811 09/13/20  0306 09/14/20  1044   SODIUM 126* 127* 126*   POTASSIUM 3.4* 3.4* 3.2*   CHLORIDE 83* 83* 88*   CO2 25 28 24   GLUCOSE 125* 133* 118*   BUN 10 12 19   CREATININE 0.56 0.58 0.45*   CALCIUM 10.0 9.7 9.1     Recent Labs     09/12/20  1237   APTT 33.8   INR 1.21*               Imaging  DX-ABDOMEN FOR TUBE PLACEMENT   Final Result         Feeding tube with tip projecting over the expected area of the proximal stomach.      MR-BRAIN-W/O   Final Result      1.  No evidence of acute territorial infarct, intracranial hemorrhage or mass lesion.   2.  Moderate diffuse cerebral substance loss.   3.  Moderate microangiopathic ischemic change versus demyelination or gliosis.   4.  Chronic ischemic pontine gliosis.   5.  Small focus of chronic hemosiderin deposition within the left inferior frontal lobe likely related to remote hemorrhagic infarct or trauma.      DX-THORACOLUMBAR SPINE-2 VIEWS   Final Result      1.  No acute fracture or dislocation identified.      2.  Degenerative changes are noted.      DX-CHEST-PORTABLE (1 VIEW)   Final Result      1.  Cardiomegaly.      2.  Mild bilateral patchy  pulmonary infiltrates.      CT-CTA NECK WITH & W/O-POST PROCESSING   Final Result      1.  Atherosclerotic plaque involving the common and internal carotid arteries bilaterally most prominently seen at the bifurcations. This results in less than 50% diameter narrowing bilaterally.      2.  Atherosclerotic plaque involving the vertebral arteries bilaterally. No evidence of occlusion.      CT-CEREBRAL PERFUSION ANALYSIS   Final Result      1.  Cerebral blood flow less than 30% likely representing completed infarct = 0 mL.      2.  T Max more than 6 seconds likely representing combination of completed infarct and ischemia = 267 mL.      3.  Mismatched volume likely representing ischemic brain/penumbra = 267 mL      4.  Please note that the cerebral perfusion was performed on the limited brain tissue around the basal ganglia region. Infarct/ischemia outside the CT perfusion sections can be missed in this study.      CT-CTA HEAD WITH & W/O-POST PROCESS   Final Result      No evidence of intracranial vascular occlusion or aneurysm.      CT-HEAD W/O   Final Result         NO ACUTE ABNORMALITIES ARE NOTED ON CT SCAN OF THE HEAD.      Findings are consistent with atrophy.  Decreased attenuation in the periventricular white matter likely indicates microvascular ischemic disease.           Assessment/Plan  * Acute encephalopathy  Assessment & Plan  Etiology is not entirely clear patient evaluated by neurology not felt to be a TPA candidate  Possible hypertensive encephalopathy and some component of metabolic encephalopathy  CT head and CTA head and neck reviewed  Mild temp but no evidence of acute infection, check procal   We will check MRI of brain which is unremarkable   Not polypharmacy given med list  Cont with Prn BP medications   Needs pt/ot/speech eval  Hyponatremic, cont with IVF as this may be contributing to his altered mental status  Check eeg which has been ordered today     Hyponatremia  Assessment &  Plan  Possibly contributing to his acute encephalopathy  Low serum osm, likely hypovolemic   Check urine lytes and urine osmo  Cont with IVF increase rate slightly   reheck labs in am     Hypokalemia  Assessment & Plan  Replete and monitor    Dyslipidemia  Assessment & Plan  Continue simvastatin    Back pain  Assessment & Plan  unremarkbale on xray    Paroxysmal atrial fibrillation (HCC)  Assessment & Plan  Continue Eliquis and sotalol       VTE prophylaxis: eliquis

## 2020-09-15 NOTE — CARE PLAN
Problem: Communication  Goal: The ability to communicate needs accurately and effectively will improve  Outcome: PROGRESSING AS EXPECTED  Note: Here for AMS. Patient confused, alert to self. All needs are anticipated by staff. Kept clean and dry throughout shift. Wife at bedside, all questions have been answered. Call light within reach, close monitoring ongoing.      Problem: Safety  Goal: Will remain free from falls  Outcome: PROGRESSING AS EXPECTED  Note: Here for AMS. Bed is locked in lowest position with bed alarm on. Upper side rails up. Bed alarm is on. Personal belongings and call light is within reach. Unable to ambulate, condom cath on. Clean and dry, no BM at this point.

## 2020-09-15 NOTE — PROGRESS NOTES
Monitor Summary: SR rate 67-81, ME -.26, QRS -.08, QT -.44, with rare and frequent PACs, frequent and occasional PVCs and bigeminy per strip from the monitor room.

## 2020-09-16 PROBLEM — E83.42 HYPOMAGNESEMIA: Status: ACTIVE | Noted: 2020-09-16

## 2020-09-16 LAB
ANION GAP SERPL CALC-SCNC: 11 MMOL/L (ref 7–16)
BUN SERPL-MCNC: 11 MG/DL (ref 8–22)
CALCIUM SERPL-MCNC: 8.9 MG/DL (ref 8.5–10.5)
CHLORIDE SERPL-SCNC: 83 MMOL/L (ref 96–112)
CO2 SERPL-SCNC: 29 MMOL/L (ref 20–33)
CREAT SERPL-MCNC: 0.31 MG/DL (ref 0.5–1.4)
GLUCOSE SERPL-MCNC: 123 MG/DL (ref 65–99)
MAGNESIUM SERPL-MCNC: 1.3 MG/DL (ref 1.5–2.5)
OSMOLALITY SERPL: 260 MOSM/KG H2O (ref 278–298)
PHOSPHATE SERPL-MCNC: 2.8 MG/DL (ref 2.5–4.5)
POTASSIUM SERPL-SCNC: 2.9 MMOL/L (ref 3.6–5.5)
SODIUM SERPL-SCNC: 123 MMOL/L (ref 135–145)

## 2020-09-16 PROCEDURE — 84100 ASSAY OF PHOSPHORUS: CPT

## 2020-09-16 PROCEDURE — 83930 ASSAY OF BLOOD OSMOLALITY: CPT

## 2020-09-16 PROCEDURE — 99233 SBSQ HOSP IP/OBS HIGH 50: CPT | Performed by: INTERNAL MEDICINE

## 2020-09-16 PROCEDURE — A9270 NON-COVERED ITEM OR SERVICE: HCPCS | Performed by: INTERNAL MEDICINE

## 2020-09-16 PROCEDURE — 700101 HCHG RX REV CODE 250: Performed by: INTERNAL MEDICINE

## 2020-09-16 PROCEDURE — 700102 HCHG RX REV CODE 250 W/ 637 OVERRIDE(OP): Performed by: HOSPITALIST

## 2020-09-16 PROCEDURE — 700105 HCHG RX REV CODE 258: Performed by: HOSPITALIST

## 2020-09-16 PROCEDURE — 36415 COLL VENOUS BLD VENIPUNCTURE: CPT

## 2020-09-16 PROCEDURE — 80048 BASIC METABOLIC PNL TOTAL CA: CPT

## 2020-09-16 PROCEDURE — 83735 ASSAY OF MAGNESIUM: CPT

## 2020-09-16 PROCEDURE — 700101 HCHG RX REV CODE 250: Performed by: HOSPITALIST

## 2020-09-16 PROCEDURE — 700102 HCHG RX REV CODE 250 W/ 637 OVERRIDE(OP): Performed by: INTERNAL MEDICINE

## 2020-09-16 PROCEDURE — A9270 NON-COVERED ITEM OR SERVICE: HCPCS | Performed by: HOSPITALIST

## 2020-09-16 PROCEDURE — 700111 HCHG RX REV CODE 636 W/ 250 OVERRIDE (IP): Performed by: INTERNAL MEDICINE

## 2020-09-16 PROCEDURE — 770020 HCHG ROOM/CARE - TELE (206)

## 2020-09-16 PROCEDURE — 97530 THERAPEUTIC ACTIVITIES: CPT

## 2020-09-16 PROCEDURE — 97110 THERAPEUTIC EXERCISES: CPT

## 2020-09-16 RX ORDER — MAGNESIUM SULFATE HEPTAHYDRATE 40 MG/ML
4 INJECTION, SOLUTION INTRAVENOUS ONCE
Status: COMPLETED | OUTPATIENT
Start: 2020-09-16 | End: 2020-09-16

## 2020-09-16 RX ORDER — SODIUM CHLORIDE AND POTASSIUM CHLORIDE 300; 900 MG/100ML; MG/100ML
INJECTION, SOLUTION INTRAVENOUS CONTINUOUS
Status: DISCONTINUED | OUTPATIENT
Start: 2020-09-16 | End: 2020-09-17

## 2020-09-16 RX ADMIN — DOCUSATE SODIUM 50 MG AND SENNOSIDES 8.6 MG 2 TABLET: 8.6; 5 TABLET, FILM COATED ORAL at 05:23

## 2020-09-16 RX ADMIN — SIMVASTATIN 20 MG: 20 TABLET, FILM COATED ORAL at 17:44

## 2020-09-16 RX ADMIN — LABETALOL HYDROCHLORIDE 10 MG: 5 INJECTION, SOLUTION INTRAVENOUS at 12:22

## 2020-09-16 RX ADMIN — METOPROLOL TARTRATE 12.5 MG: 25 TABLET, FILM COATED ORAL at 05:23

## 2020-09-16 RX ADMIN — APIXABAN 5 MG: 5 TABLET, FILM COATED ORAL at 20:48

## 2020-09-16 RX ADMIN — POTASSIUM BICARBONATE 25 MEQ: 978 TABLET, EFFERVESCENT ORAL at 18:26

## 2020-09-16 RX ADMIN — ACETAMINOPHEN 500 MG: 500 TABLET ORAL at 22:22

## 2020-09-16 RX ADMIN — POTASSIUM BICARBONATE 25 MEQ: 978 TABLET, EFFERVESCENT ORAL at 22:22

## 2020-09-16 RX ADMIN — ACETAMINOPHEN 500 MG: 500 TABLET ORAL at 12:22

## 2020-09-16 RX ADMIN — APIXABAN 5 MG: 5 TABLET, FILM COATED ORAL at 05:24

## 2020-09-16 RX ADMIN — SODIUM CHLORIDE: 9 INJECTION, SOLUTION INTRAVENOUS at 05:32

## 2020-09-16 RX ADMIN — MAGNESIUM SULFATE IN WATER 4 G: 40 INJECTION, SOLUTION INTRAVENOUS at 18:33

## 2020-09-16 RX ADMIN — POTASSIUM CHLORIDE AND SODIUM CHLORIDE: 900; 300 INJECTION, SOLUTION INTRAVENOUS at 22:21

## 2020-09-16 RX ADMIN — SOTALOL HYDROCHLORIDE 80 MG: 80 TABLET ORAL at 05:24

## 2020-09-16 RX ADMIN — MONTELUKAST 10 MG: 10 TABLET, FILM COATED ORAL at 05:23

## 2020-09-16 RX ADMIN — METOPROLOL TARTRATE 12.5 MG: 25 TABLET, FILM COATED ORAL at 17:44

## 2020-09-16 RX ADMIN — ACETAMINOPHEN 500 MG: 500 TABLET ORAL at 09:52

## 2020-09-16 RX ADMIN — ACETAMINOPHEN 500 MG: 500 TABLET ORAL at 17:44

## 2020-09-16 RX ADMIN — SOTALOL HYDROCHLORIDE 120 MG: 120 TABLET ORAL at 20:48

## 2020-09-16 ASSESSMENT — GAIT ASSESSMENTS: GAIT LEVEL OF ASSIST: UNABLE TO PARTICIPATE

## 2020-09-16 ASSESSMENT — COGNITIVE AND FUNCTIONAL STATUS - GENERAL
STANDING UP FROM CHAIR USING ARMS: A LOT
MOVING FROM LYING ON BACK TO SITTING ON SIDE OF FLAT BED: A LITTLE
WALKING IN HOSPITAL ROOM: TOTAL
SUGGESTED CMS G CODE MODIFIER MOBILITY: CM
MOVING TO AND FROM BED TO CHAIR: UNABLE
TURNING FROM BACK TO SIDE WHILE IN FLAT BAD: UNABLE
CLIMB 3 TO 5 STEPS WITH RAILING: TOTAL
MOBILITY SCORE: 9

## 2020-09-16 ASSESSMENT — PAIN DESCRIPTION - PAIN TYPE
TYPE: ACUTE PAIN;CHRONIC PAIN

## 2020-09-16 NOTE — DISCHARGE PLANNING
Anticipated Discharge Disposition:   SNF    Action:    Pt admitted with acute encephalopathy, HTN, hyponatremia, hypokalemia, dyslipidemia, back pain, parox. Afib.  Pt with cortrak, disoriented, aphasic.    RN CHRIS spoke to patient's spouse, Cynthia via telephone.  She stated that she and pt live in [facility] [place].  The home is two stories; however, they do not use the upstairs routinely.  They move to [facility] in mid October.  Pt fell about a week prior to this hospitalization.  He does not use DME and was independent with ADLs and IADLs.  Pts children and grandchildren live in [place].    PT/OT/SLP recommending post-acute placement.  Pt with global aphasia, unable to follow commands, max assist.  FEES.    Discussed SNF for rehab with patient's spouse.  Emailed choice form for [facility] to [email or web address].    Barriers to Discharge:    Placement acceptance  Medical clearance    Plan:    F/U with spouse for SNF choices.    Care Transition Team Assessment    Information Source  Orientation : Unable to Assess  Information Given By: Spouse  Informant's Name: Cynthia Alexander  Who is responsible for making decisions for patient? : Spouse    Readmission Evaluation  Is this a readmission?: No    Elopement Risk  Legal Hold: No  Ambulatory or Self Mobile in Wheelchair: No-Not an Elopement Risk  Elopement Risk: Not at Risk for Elopement    Interdisciplinary Discharge Planning  Lives with - Patient's Self Care Capacity: Spouse(Erasmo)  Patient or legal guardian wants to designate a caregiver: Yes  Caregiver name: Cynthia Alexander  Caregiver contact info: [phone]  Housing / Facility: Unable To Determine At This Time  Prior Services: Home-Independent    Discharge Preparedness  What is your plan after discharge?: Uncertain - pending medical team collaboration  What are your discharge supports?: Child, Spouse  Prior Functional Level: Ambulatory, Drives Self, Independent with Activities of Daily Living,  Independent with Medication Management  Difficulity with ADLs: Bathing, Brushing teeth, Dressing, Eating, Toileting, Walking  Difficulity with IADLs: Cooking, Driving, Keeping track of finances, Laundry, Managing medication, Shopping, Using the telephone or computer    Functional Assesment  Prior Functional Level: Ambulatory, Drives Self, Independent with Activities of Daily Living, Independent with Medication Management    Finances  Financial Barriers to Discharge: No  Prescription Coverage: Yes    Vision / Hearing Impairment  Vision Impairment : Yes  Right Eye Vision: Impaired, Wears Glasses  Left Eye Vision: Impaired, Wears Glasses  Hearing Impairment : Yes  Hearing Impairment: Both Ears, Hearing Device Not Available  Does Pt Need Special Equipment for the Hearing Impaired?: No         Advance Directive  Advance Directive?: None    Domestic Abuse  Have you ever been the victim of abuse or violence?: No  Physical Abuse or Sexual Abuse: No  Verbal Abuse or Emotional Abuse: No  Possible Abuse/Neglect Reported to:: Not Applicable         Discharge Risks or Barriers  Discharge risks or barriers?: No  Patient risk factors: Vulnerable adult    Anticipated Discharge Information  Discharge Disposition: Still a Patient (30)  Discharge Contact Phone Number: [phone]

## 2020-09-16 NOTE — PROGRESS NOTES
Monitor summary: SR, 63-95, NE 0.22, QRS 0.10, QT 0.42, with rare 1.5-1.7 sinus pauses, rare PVCs/PACs per strip from monitor room.

## 2020-09-16 NOTE — CARE PLAN
Problem: Infection  Goal: Will remain free from infection  Outcome: PROGRESSING AS EXPECTED  Note: Here for AMS. VSS, afebrile. Close monitoring of labs ongoing. No s/sx of infection. Skin is intact. Patient kept clean and dry throughout shift.       Problem: Skin Integrity  Goal: Risk for impaired skin integrity will decrease  Outcome: PROGRESSING AS EXPECTED  Note: Here for AMS. Q2H turns ongoing, skin barrier used, close monitoring of skin integrity ongoing.

## 2020-09-16 NOTE — PROGRESS NOTES
Monitor summary: SB/SR 59-84, MA 0.20, QRS 0.08, QT 0.42, with rare PACs and PVCs per strip from monitor room.

## 2020-09-16 NOTE — CARE PLAN
Problem: Nutritional:  Goal: Nutrition support tolerated and meeting greater than 85% of estimated needs  Outcome: MET     TF @ goal (Fibersource @ 65 ml/hr).

## 2020-09-17 ENCOUNTER — APPOINTMENT (OUTPATIENT)
Dept: RADIOLOGY | Facility: MEDICAL CENTER | Age: 75
DRG: 643 | End: 2020-09-17
Attending: INTERNAL MEDICINE
Payer: MEDICARE

## 2020-09-17 LAB
ANION GAP SERPL CALC-SCNC: 12 MMOL/L (ref 7–16)
ANION GAP SERPL CALC-SCNC: 12 MMOL/L (ref 7–16)
ANION GAP SERPL CALC-SCNC: 13 MMOL/L (ref 7–16)
ANION GAP SERPL CALC-SCNC: 13 MMOL/L (ref 7–16)
BUN SERPL-MCNC: 12 MG/DL (ref 8–22)
BUN SERPL-MCNC: 14 MG/DL (ref 8–22)
CALCIUM SERPL-MCNC: 8.7 MG/DL (ref 8.5–10.5)
CALCIUM SERPL-MCNC: 8.8 MG/DL (ref 8.5–10.5)
CALCIUM SERPL-MCNC: 8.9 MG/DL (ref 8.5–10.5)
CALCIUM SERPL-MCNC: 9 MG/DL (ref 8.5–10.5)
CHLORIDE SERPL-SCNC: 82 MMOL/L (ref 96–112)
CHLORIDE SERPL-SCNC: 83 MMOL/L (ref 96–112)
CHLORIDE SERPL-SCNC: 85 MMOL/L (ref 96–112)
CHLORIDE SERPL-SCNC: 85 MMOL/L (ref 96–112)
CO2 SERPL-SCNC: 25 MMOL/L (ref 20–33)
CO2 SERPL-SCNC: 26 MMOL/L (ref 20–33)
CO2 SERPL-SCNC: 27 MMOL/L (ref 20–33)
CO2 SERPL-SCNC: 28 MMOL/L (ref 20–33)
CREAT SERPL-MCNC: 0.27 MG/DL (ref 0.5–1.4)
CREAT SERPL-MCNC: 0.32 MG/DL (ref 0.5–1.4)
CREAT SERPL-MCNC: 0.35 MG/DL (ref 0.5–1.4)
CREAT SERPL-MCNC: 0.36 MG/DL (ref 0.5–1.4)
GLUCOSE SERPL-MCNC: 127 MG/DL (ref 65–99)
GLUCOSE SERPL-MCNC: 138 MG/DL (ref 65–99)
GLUCOSE SERPL-MCNC: 138 MG/DL (ref 65–99)
GLUCOSE SERPL-MCNC: 146 MG/DL (ref 65–99)
MAGNESIUM SERPL-MCNC: 1.7 MG/DL (ref 1.5–2.5)
MAGNESIUM SERPL-MCNC: 1.9 MG/DL (ref 1.5–2.5)
MAGNESIUM SERPL-MCNC: 2.2 MG/DL (ref 1.5–2.5)
MAGNESIUM SERPL-MCNC: 2.3 MG/DL (ref 1.5–2.5)
NT-PROBNP SERPL IA-MCNC: 4894 PG/ML (ref 0–125)
POTASSIUM SERPL-SCNC: 3.4 MMOL/L (ref 3.6–5.5)
POTASSIUM SERPL-SCNC: 3.6 MMOL/L (ref 3.6–5.5)
POTASSIUM SERPL-SCNC: 3.7 MMOL/L (ref 3.6–5.5)
POTASSIUM SERPL-SCNC: 3.9 MMOL/L (ref 3.6–5.5)
SODIUM SERPL-SCNC: 122 MMOL/L (ref 135–145)
SODIUM SERPL-SCNC: 122 MMOL/L (ref 135–145)
SODIUM SERPL-SCNC: 123 MMOL/L (ref 135–145)
SODIUM SERPL-SCNC: 124 MMOL/L (ref 135–145)

## 2020-09-17 PROCEDURE — 700102 HCHG RX REV CODE 250 W/ 637 OVERRIDE(OP): Performed by: HOSPITALIST

## 2020-09-17 PROCEDURE — 99233 SBSQ HOSP IP/OBS HIGH 50: CPT | Performed by: INTERNAL MEDICINE

## 2020-09-17 PROCEDURE — 80048 BASIC METABOLIC PNL TOTAL CA: CPT | Mod: 91

## 2020-09-17 PROCEDURE — 700102 HCHG RX REV CODE 250 W/ 637 OVERRIDE(OP): Performed by: INTERNAL MEDICINE

## 2020-09-17 PROCEDURE — 97112 NEUROMUSCULAR REEDUCATION: CPT

## 2020-09-17 PROCEDURE — 700101 HCHG RX REV CODE 250: Performed by: INTERNAL MEDICINE

## 2020-09-17 PROCEDURE — 36415 COLL VENOUS BLD VENIPUNCTURE: CPT

## 2020-09-17 PROCEDURE — 83735 ASSAY OF MAGNESIUM: CPT | Mod: 91

## 2020-09-17 PROCEDURE — A9270 NON-COVERED ITEM OR SERVICE: HCPCS | Performed by: HOSPITALIST

## 2020-09-17 PROCEDURE — 770020 HCHG ROOM/CARE - TELE (206)

## 2020-09-17 PROCEDURE — 85025 COMPLETE CBC W/AUTO DIFF WBC: CPT

## 2020-09-17 PROCEDURE — 83880 ASSAY OF NATRIURETIC PEPTIDE: CPT

## 2020-09-17 PROCEDURE — 97530 THERAPEUTIC ACTIVITIES: CPT

## 2020-09-17 PROCEDURE — A9270 NON-COVERED ITEM OR SERVICE: HCPCS | Performed by: INTERNAL MEDICINE

## 2020-09-17 PROCEDURE — 71045 X-RAY EXAM CHEST 1 VIEW: CPT

## 2020-09-17 PROCEDURE — 700111 HCHG RX REV CODE 636 W/ 250 OVERRIDE (IP): Performed by: INTERNAL MEDICINE

## 2020-09-17 RX ORDER — MAGNESIUM SULFATE HEPTAHYDRATE 40 MG/ML
2 INJECTION, SOLUTION INTRAVENOUS ONCE
Status: COMPLETED | OUTPATIENT
Start: 2020-09-17 | End: 2020-09-17

## 2020-09-17 RX ORDER — FUROSEMIDE 10 MG/ML
40 INJECTION INTRAMUSCULAR; INTRAVENOUS ONCE
Status: COMPLETED | OUTPATIENT
Start: 2020-09-17 | End: 2020-09-17

## 2020-09-17 RX ADMIN — ACETAMINOPHEN 500 MG: 500 TABLET ORAL at 09:03

## 2020-09-17 RX ADMIN — POTASSIUM BICARBONATE 25 MEQ: 978 TABLET, EFFERVESCENT ORAL at 09:04

## 2020-09-17 RX ADMIN — SOTALOL HYDROCHLORIDE 120 MG: 120 TABLET ORAL at 16:24

## 2020-09-17 RX ADMIN — ACETAMINOPHEN 500 MG: 500 TABLET ORAL at 20:30

## 2020-09-17 RX ADMIN — POTASSIUM CHLORIDE AND SODIUM CHLORIDE: 900; 300 INJECTION, SOLUTION INTRAVENOUS at 06:34

## 2020-09-17 RX ADMIN — MAGNESIUM SULFATE 2 G: 2 INJECTION INTRAVENOUS at 09:42

## 2020-09-17 RX ADMIN — SOTALOL HYDROCHLORIDE 80 MG: 80 TABLET ORAL at 04:12

## 2020-09-17 RX ADMIN — SIMVASTATIN 20 MG: 20 TABLET, FILM COATED ORAL at 16:25

## 2020-09-17 RX ADMIN — METOPROLOL TARTRATE 12.5 MG: 25 TABLET, FILM COATED ORAL at 16:26

## 2020-09-17 RX ADMIN — APIXABAN 5 MG: 5 TABLET, FILM COATED ORAL at 16:25

## 2020-09-17 RX ADMIN — MONTELUKAST 10 MG: 10 TABLET, FILM COATED ORAL at 04:10

## 2020-09-17 RX ADMIN — ACETAMINOPHEN 500 MG: 500 TABLET ORAL at 13:00

## 2020-09-17 RX ADMIN — ACETAMINOPHEN 500 MG: 500 TABLET ORAL at 16:24

## 2020-09-17 RX ADMIN — APIXABAN 5 MG: 5 TABLET, FILM COATED ORAL at 04:10

## 2020-09-17 RX ADMIN — METOPROLOL TARTRATE 12.5 MG: 25 TABLET, FILM COATED ORAL at 04:09

## 2020-09-17 RX ADMIN — LABETALOL HYDROCHLORIDE 10 MG: 5 INJECTION, SOLUTION INTRAVENOUS at 08:49

## 2020-09-17 RX ADMIN — LABETALOL HYDROCHLORIDE 10 MG: 5 INJECTION, SOLUTION INTRAVENOUS at 20:30

## 2020-09-17 RX ADMIN — DOCUSATE SODIUM 50 MG AND SENNOSIDES 8.6 MG 2 TABLET: 8.6; 5 TABLET, FILM COATED ORAL at 04:10

## 2020-09-17 RX ADMIN — POTASSIUM BICARBONATE 25 MEQ: 978 TABLET, EFFERVESCENT ORAL at 17:46

## 2020-09-17 RX ADMIN — FUROSEMIDE 40 MG: 10 INJECTION, SOLUTION INTRAMUSCULAR; INTRAVENOUS at 17:46

## 2020-09-17 ASSESSMENT — COGNITIVE AND FUNCTIONAL STATUS - GENERAL
MOVING FROM LYING ON BACK TO SITTING ON SIDE OF FLAT BED: A LITTLE
DAILY ACTIVITIY SCORE: 13
SUGGESTED CMS G CODE MODIFIER MOBILITY: CL
TOILETING: A LOT
WALKING IN HOSPITAL ROOM: A LOT
TURNING FROM BACK TO SIDE WHILE IN FLAT BAD: A LOT
MOBILITY SCORE: 12
CLIMB 3 TO 5 STEPS WITH RAILING: TOTAL
DRESSING REGULAR UPPER BODY CLOTHING: A LITTLE
SUGGESTED CMS G CODE MODIFIER DAILY ACTIVITY: CL
PERSONAL GROOMING: A LITTLE
HELP NEEDED FOR BATHING: A LOT
EATING MEALS: TOTAL
STANDING UP FROM CHAIR USING ARMS: A LOT
MOVING TO AND FROM BED TO CHAIR: A LOT
DRESSING REGULAR LOWER BODY CLOTHING: A LOT

## 2020-09-17 ASSESSMENT — PAIN DESCRIPTION - PAIN TYPE
TYPE: ACUTE PAIN;CHRONIC PAIN
TYPE: ACUTE PAIN;CHRONIC PAIN

## 2020-09-17 ASSESSMENT — GAIT ASSESSMENTS
ASSISTIVE DEVICE: HAND HELD ASSIST
GAIT LEVEL OF ASSIST: MODERATE ASSIST
DISTANCE (FEET): 4

## 2020-09-17 NOTE — PROGRESS NOTES
Monitor summary: SR/ST , FL 0.20, QRS 0.10, QT 0.44, with rare & frequent PACs & PVCs, trig PACs, & two 1.6 second pauses, per strip from monitor room.

## 2020-09-17 NOTE — THERAPY
Physical Therapy   Daily Treatment     Patient Name: Jordan Alexander  Age:  75 y.o., Sex:  male  Medical Record #: [id number]  Today's Date: 9/16/2020     Precautions: (P) Fall Risk, Swallow Precautions ( See Comments)    Assessment    Pt appears more alert and participatory today, even though still confused. Pt w/ history of GLF on 9/5, AMS. Currently has Cortrak. PT session included bed mobility rolling for BM cleanup, as well as moving to sitting EOB. Pt required repeated prompts not to pull at cortrak during session, but was easily redirectable. Pt appears to have fair sitting balance and tolerance, and was able to resist PT PNF techniques and remain upright when moved beyond LOS, but limited more by impaired cognition than functional strength. Again, pt able to achieve SPV STS at EOB by end of session, but unable to take steps or sidestep at EOB, likely more due to cog deficits than motor weakness, as he was able to weight shift R/L w/ therapist MC. Pt's improvement this session from initial eval is promising for good rehab potential, and he would likely benefit from increase frequency, thus increased to 4x/week as below.    Plan    Treatment plan modified to 4 times per week until therapy goals are met for the following treatments:  Bed Mobility, Equipment, Gait Training, Neuro Re-Education / Balance, Stair Training, Therapeutic Activities and Therapeutic Exercises.    DC Equipment Recommendations: (P) Unable to determine at this time  Discharge Recommendations: (P) Recommend post-acute placement for additional physical therapy services prior to discharge home       09/16/20 1628   Strength Lower Body   Comments not formally tested, but able to shift R/L laterally in bed, and functional for STS at EOB   Sitting Lower Body Exercises   Sitting Lower Body Exercises Yes   Long Arc Quad 1 set of 10;Bilateral   Marching Reciprocal;2 sets of 10   Sit to Stand   (x4)   Standing Lower Body Exercises   Standing Lower Body  Exercises Yes  (weight shifts)   Neuro-Muscular Treatments   Neuro-Muscular Treatments Co-Contraction   Comments PNF for seated balance   Gait Analysis   Comments pre gait weight shifting initiated   Bed Mobility    Supine to Sit Maximal Assist   Sit to Supine Minimal Assist   Scooting Supervised   Skilled Intervention Tactile Cuing;Verbal Cuing;Facilitation   Comments requires significantly more A for supine bed mobility, whereas once sitting EOB, much improved   Functional Mobility   Comments min A for first 2 STS, then SPV for 2 more   Activity Tolerance   Sitting Edge of Bed ~15 min   Standing ~5 min total   Comments pt notably fatigued at end of session   Short Term Goals    Short Term Goal # 1 Pt will be SPV for supine<>sit at EOB in 6 txs to improve functional mobility.   Goal Outcome # 1 goal not met   Short Term Goal # 2 Pt will be SPV for EOB sitting x 10 min in 6txs to improve upright tolerance.   Goal Outcome # 2 Progressing as expected   Short Term Goal # 3 Pt will be Rodolfo for transfers with LRAD in 6 txs to improve fucntional mobility.   Goal Outcome # 3 Goal not met   Short Term Goal # 4 Pt will be Rodolfo for gait > 50' with LRAD in 6 txs to improve functional mobility.   Goal Outcome # 4 Goal not met   Education Group   Education Provided Role of Physical Therapist   Role of Physical Therapist Patient Response Patient;Significant Other;Acceptance;Explanation;Verbal Demonstration   Anticipated Discharge Equipment and Recommendations   DC Equipment Recommendations Unable to determine at this time   Discharge Recommendations Recommend post-acute placement for additional physical therapy services prior to discharge home

## 2020-09-17 NOTE — PROGRESS NOTES
Hospital Medicine Daily Progress Note    Date of Service  9/17/2020    Chief Complaint  75 y.o. male admitted 9/12/2020 with altered mental status.     Hospital Course    75 y.o. male who presented 9/12/2020 with altered mental status.  Apparently the patient was transferred for evaluation of altered mental status that started this morning progressively got worse was associated with some right-sided weakness that started around 11:05 a.m..  The patient was transferred as a code stroke he was evaluated by neurology and felt that his symptoms are likely related to hypertensive and possibly metabolic encephalopathy.      Interval Problem Update  Continue restraints as patient pulls a core track.  Speech improving but encephalopathy remains severe.  Severe hypo-natremia, hypokalemia, hypomagnesemia yesterday.  Potassium and magnesium improved with supplementation.  Sodium remains resistant to correction despite IV NS and removal of free water flushes.  Serum osmolality remains low, repeat urine osmolality not collected.  Patient unable to cooperate with exam due to mental status, reassessed volume status objectively and will try diuresis.    Discussed his medication list with pharmacist.  He is not on any medications that are known to cause SIADH.  Current approach will be to optimize volume status.    Consultants/Specialty  neuro    Code Status  Full Code    Disposition  CM and wife working on choice for SNF placement    Review of Systems  Review of Systems   Unable to perform ROS: Mental status change        Physical Exam  Temp:  [36.2 °C (97.1 °F)-37 °C (98.6 °F)] 37 °C (98.6 °F)  Pulse:  [65-74] 72  Resp:  [16-17] 16  BP: (146-180)/(67-90) 154/71  SpO2:  [92 %-96 %] 92 %    Physical Exam  Vitals signs reviewed.   Constitutional:       General: He is not in acute distress.     Appearance: He is ill-appearing.   HENT:      Head: Normocephalic and atraumatic.      Nose: No congestion.      Mouth/Throat:      Mouth:  Mucous membranes are moist.   Eyes:      Extraocular Movements: Extraocular movements intact.      Pupils: Pupils are equal, round, and reactive to light.   Neck:      Musculoskeletal: Neck supple.   Cardiovascular:      Rate and Rhythm: Normal rate and regular rhythm.      Pulses: Normal pulses.      Heart sounds: Normal heart sounds.   Pulmonary:      Effort: Pulmonary effort is normal. No respiratory distress.      Breath sounds: Normal breath sounds. No wheezing.   Abdominal:      General: Bowel sounds are normal. There is no distension.      Palpations: Abdomen is soft.      Tenderness: There is no abdominal tenderness.   Musculoskeletal:         General: No swelling.   Skin:     General: Skin is warm and dry.      Capillary Refill: Capillary refill takes less than 2 seconds.   Neurological:      Mental Status: He is alert. He is disoriented and confused.      Motor: Motor function is intact.   Psychiatric:         Attention and Perception: Attention normal.         Mood and Affect: Affect is inappropriate.         Speech: Speech is delayed and tangential.         Behavior: Behavior is uncooperative and withdrawn. Behavior is not agitated or aggressive.         Fluids    Intake/Output Summary (Last 24 hours) at 9/17/2020 1507  Last data filed at 9/17/2020 0600  Gross per 24 hour   Intake 2340 ml   Output 1300 ml   Net 1040 ml       Laboratory      Recent Labs     09/17/20  0012 09/17/20  0708 09/17/20  1207   SODIUM 123* 124* 122*   POTASSIUM 3.4* 3.6 3.7   CHLORIDE 82* 85* 83*   CO2 28 27 26   GLUCOSE 138* 138* 146*   BUN 12 12 12   CREATININE 0.27* 0.32* 0.35*   CALCIUM 8.8 8.7 9.0                   Imaging  None new in past 24h     Assessment/Plan  * Acute encephalopathy- (present on admission)  Assessment & Plan  Etiology is not entirely clear patient evaluated by neurology not felt to be a TPA candidate.  Possible hypertensive encephalopathy and some component of metabolic encephalopathy.  CT head and CTA  head and neck reviewed.  No evidence of acute infection, procal wnl.  MRI of brain with chronic but no acute abnormalities.   EEG with encephalopathy, no seizure captured.  Not polypharmacy given med list.  Cont with Prn BP medications.  Continue pt/ot/speech therapies  Hyponatremia is likely to contribute to his altered mental status.  We will continue to treat this potential underlying cause as noted.    Hypokalemia- (present on admission)  Assessment & Plan  Improved with supplementation.  Replete and monitor closely.    Hyponatremia- (present on admission)  Assessment & Plan  Possibly contributing to his acute encephalopathy.  Low serum osm, likely hypovolemic. No new urine osm collected, was high on admission.  High urine sodium may suggest SIADH, but this is a diagnosis of exclusion and there is no clear cause.  Physical exam is difficult due to patient's mental status, but elevated BNP suggests hypervolemia.  Discontinue IVF.  Will trial loop diuretics.    Continue to closely monitor scheduled labs q6h.    Hypomagnesemia- (present on admission)  Assessment & Plan  Replete and monitor closely.    Paroxysmal atrial fibrillation (HCC)- (present on admission)  Assessment & Plan  Continue Eliquis and sotalol. Sotalol being held for hypokalemia.    Dyslipidemia- (present on admission)  Assessment & Plan  Continue simvastatin    Back pain- (present on admission)  Assessment & Plan  unremarkbale on xray       VTE prophylaxis: eliquis

## 2020-09-17 NOTE — CARE PLAN
Problem: Psychosocial Needs:  Goal: Level of anxiety will decrease  Outcome: PROGRESSING AS EXPECTED     Problem: Communication  Goal: The ability to communicate needs accurately and effectively will improve  Outcome: PROGRESSING AS EXPECTED     Problem: Bowel/Gastric:  Goal: Will not experience complications related to bowel motility  Outcome: PROGRESSING AS EXPECTED

## 2020-09-17 NOTE — CARE PLAN
Problem: Psychosocial Needs:  Goal: Level of anxiety will decrease  Outcome: PROGRESSING AS EXPECTED     Problem: Safety  Goal: Will remain free from falls  Outcome: PROGRESSING AS EXPECTED     Problem: Knowledge Deficit  Goal: Knowledge of the prescribed therapeutic regimen will improve  Outcome: PROGRESSING AS EXPECTED     Problem: Safety - Medical Restraint  Goal: Remains free of injury from restraints (Restraint for Interference with Medical Device)  Description: INTERVENTIONS:  1. Determine that other, less restrictive measures have been tried or would not be effective before applying the restraint  2. Evaluate the patient's condition at the time of restraint application  3. Inform patient/family regarding the reason for restraint  4. Q2H: Monitor safety, psychosocial status, comfort, nutrition and hydration  Outcome: PROGRESSING AS EXPECTED

## 2020-09-17 NOTE — THERAPY
Physical Therapy   Daily Treatment     Patient Name: Jordan Alexander  Age:  75 y.o., Sex:  male  Medical Record #: [id number]  Today's Date: 9/17/2020     Precautions: Fall Risk, Swallow Precautions ( See Comments)    Assessment    Pt demonstrating improved standing balance and improved B wt shifting, able to sidestep at EOB with B HHA.    Plan    Continue current treatment plan.    DC Equipment Recommendations: Unable to determine at this time  Discharge Recommendations: Recommend post-acute placement for additional physical therapy services prior to discharge home      Subjective    Pt agrees to PT.     Objective       09/17/20 1346   Cognition    Speech/ Communication Delayed Responses;Word Finding Impairment   Level of Consciousness Confused   Ability To Follow Commands 1 Step   Safety Awareness Impaired   Attention Impaired   Comments Pt cooperative.   Passive ROM Lower Body   Passive ROM Lower Body WDL   Active ROM Lower Body    Gross Active ROM Gross Active Range of Motion Impaired,  but Appears Adequate for Functional Mobility.   Strength Lower Body   Rt Hip Flexion Strength 2 (P)   Rt Knee Flexion Strength 3- (F-)   Rt Knee Extension Strength 3- (F-)   Rt Ankle Dorsiflexion Strength 3- (F-)   Rt Ankle Plantar Flexion Strength 3- (F-)   Lt Hip Flexion Strength 3- (F-)   Lt Knee Flexion Strength 3 (F)   Lt Knee Extension Strength 3+ (F+)   Lt Ankle Dorsiflexion Strength 3+ (F+)   Lt Ankle Plantar Flexion Strength 3+ (F+)   Neuro-Muscular Treatments   Neuro-Muscular Treatments Anterior weight shift;Facilitation;Verbal Cuing;Weight Shift Right;Weight Shift Left;Sequencing;Tactile Cuing   Comments standing at EOB with B HHA   Balance   Sitting Balance (Static) Fair   Sitting Balance (Dynamic) Fair -   Standing Balance (Static) Fair   Standing Balance (Dynamic) Fair -   Weight Shift Sitting Fair   Weight Shift Standing Poor   Skilled Intervention Verbal Cuing;Tactile Cuing;Sequencing;Compensatory  Strategies;Facilitation   Comments with B HHA   Gait Analysis   Gait Level Of Assist Moderate Assist   Assistive Device Hand Held Assist   Distance (Feet) 4  (B lateral steps at EOB with assist for B wt shifting and seq)   # of Times Distance was Traveled 2   Skilled Intervention Verbal Cuing;Tactile Cuing;Sequencing;Compensatory Strategies   Bed Mobility    Supine to Sit Moderate Assist   Sit to Supine Moderate Assist   Scooting Minimal Assist   Skilled Intervention Verbal Cuing;Tactile Cuing;Sequencing;Compensatory Strategies   Functional Mobility   Sit to Stand Minimal Assist   Mobility supine>sit at EOB>stand x 2   Skilled Intervention Verbal Cuing;Tactile Cuing;Sequencing;Compensatory Strategies   Activity Tolerance   Sitting Edge of Bed 15 min   Standing 6 min total   Short Term Goals    Short Term Goal # 1 Pt will be SPV for supine<>sit at EOB in 6 txs to improve functional mobility.   Goal Outcome # 1 goal not met   Short Term Goal # 2 Pt will be SPV for EOB sitting x 10 min in 6txs to improve upright tolerance.   Goal Outcome # 2 Progressing as expected   Short Term Goal # 3 Pt will be Rodolfo for transfers with LRAD in 6 txs to improve fucntional mobility.   Goal Outcome # 3 Goal not met   Short Term Goal # 4 Pt will be Rodolfo for gait > 50' with LRAD in 6 txs to improve functional mobility.   Goal Outcome # 4 Goal not met   Education Group   Role of Physical Therapist Patient Response Patient;Acceptance;Explanation;Demonstration;Action Demonstration;Reinforcement Needed   Anticipated Discharge Equipment and Recommendations   DC Equipment Recommendations Unable to determine at this time   Discharge Recommendations Recommend post-acute placement for additional physical therapy services prior to discharge home

## 2020-09-17 NOTE — PROGRESS NOTES
Monitor summary: SR, 65-77, NV 0.14, QRS 0.08, QT 0.48, with rare and frequent PVCs per strip from monitor room.

## 2020-09-17 NOTE — PROGRESS NOTES
[facility] Medicine Daily Progress Note    Date of Service  9/16/2020    Chief Complaint  75 y.o. male admitted 9/12/2020 with altered mental status.     Hospital Course    75 y.o. male who presented 9/12/2020 with altered mental status.  Apparently the patient was transferred for evaluation of altered mental status that started this morning progressively got worse was associated with some right-sided weakness that started around 11:05 a.m..  The patient was transferred as a code stroke he was evaluated by neurology and felt that his symptoms are likely related to hypertensive and possibly metabolic encephalopathy.      Interval Problem Update  Continue restraints as pt pulls at cortrak. Speech improving.  Severe hyponatremia, hypokalemia, hypomagnesemia. Continuing aggressive replacement, scheduled labs. Serum and urine osmolality pending.    Consultants/Specialty  neuro    Code Status  Full Code    Disposition  CM and wife working on choice for SNF placement    Review of Systems  Review of Systems   Unable to perform ROS: Mental status change        Physical Exam  Temp:  [36.1 °C (97 °F)-37 °C (98.6 °F)] 36.4 °C (97.5 °F)  Pulse:  [67-82] 69  Resp:  [16-17] 16  BP: (150-184)/(66-90) 180/80  SpO2:  [92 %-96 %] 96 %    Physical Exam  Vitals signs reviewed.   Constitutional:       General: He is not in acute distress.     Appearance: He is ill-appearing.   HENT:      Head: Normocephalic and atraumatic.      Nose: No congestion.      Mouth/Throat:      Mouth: Mucous membranes are moist.   Eyes:      Extraocular Movements: Extraocular movements intact.      Pupils: Pupils are equal, round, and reactive to light.   Neck:      Musculoskeletal: Neck supple.   Cardiovascular:      Rate and Rhythm: Normal rate and regular rhythm.      Pulses: Normal pulses.      Heart sounds: Normal heart sounds.   Pulmonary:      Effort: Pulmonary effort is normal. No respiratory distress.      Breath sounds: Normal breath sounds. No  wheezing.   Abdominal:      General: Bowel sounds are normal. There is no distension.      Palpations: Abdomen is soft.      Tenderness: There is no abdominal tenderness.   Musculoskeletal:         General: No swelling.   Skin:     General: Skin is warm and dry.      Capillary Refill: Capillary refill takes less than 2 seconds.   Neurological:      Mental Status: He is alert.      Comments: Expressive aphasia.   Psychiatric:         Attention and Perception: Attention normal.         Mood and Affect: Affect normal.         Behavior: Behavior is cooperative.         Fluids    Intake/Output Summary (Last 24 hours) at 9/16/2020 2034  Last data filed at 9/16/2020 0600  Gross per 24 hour   Intake 2340 ml   Output 900 ml   Net 1440 ml       Laboratory      Recent Labs     09/14/20  1044 09/15/20  0853 09/16/20  1256   SODIUM 126* 129* 123*   POTASSIUM 3.2* 3.2* 2.9*   CHLORIDE 88* 89* 83*   CO2 24 28 29   GLUCOSE 118* 135* 123*   BUN 19 16 11   CREATININE 0.45* 0.43* 0.31*   CALCIUM 9.1 9.0 8.9                   Imaging  None new in past 24h     Assessment/Plan  * Acute encephalopathy- (present on admission)  Assessment & Plan  Etiology is not entirely clear patient evaluated by neurology not felt to be a TPA candidate.  Possible hypertensive encephalopathy and some component of metabolic encephalopathy.  CT head and CTA head and neck reviewed.  No evidence of acute infection, procal wnl.  MRI of brain with chronic but no acute abnormalities.   EEG with encephalopathy, no seizure captured.  Not polypharmacy given med list.  Cont with Prn BP medications.  Continue pt/ot/speech therapies  Hyponatremic, cont with IVF as this may be contributing to his altered mental status.    Hyponatremia- (present on admission)  Assessment & Plan  Possibly contributing to his acute encephalopathy.  Low serum osm, likely hypovolemic. No urine osm collected.  Check serum, urine osm.  Cont IVF.  Scheduled labs q6h.    Hypomagnesemia- (present  on admission)  Assessment & Plan  Replete and monitor closely.    Hypokalemia- (present on admission)  Assessment & Plan  Persistent.   Replete and monitor closely.  Replete Magnesium for better absorption.    Dyslipidemia- (present on admission)  Assessment & Plan  Continue simvastatin    Back pain- (present on admission)  Assessment & Plan  unremarkbale on xray    Paroxysmal atrial fibrillation (HCC)- (present on admission)  Assessment & Plan  Continue Eliquis and sotalol. Sotalol being held for hypokalemia.       VTE prophylaxis: eliquis

## 2020-09-17 NOTE — THERAPY
Occupational Therapy  Daily Treatment     Patient Name: Jordan Alexander  Age:  75 y.o., Sex:  male  Medical Record #: [id number]  Today's Date: 9/17/2020     Precautions  Precautions: Fall Risk, Swallow Precautions ( See Comments)  Comments: GLF PTA    Assessment    Making progress, remains limited by weakness, fatigue, impaired balance, impaired cognition.    Plan    Continue current treatment plan.    DC Equipment Recommendations: Unable to determine at this time  Discharge Recommendations: Recommend post-acute placement for additional occupational therapy services prior to discharge home       09/17/20 0931   Cognition    Cognition / Consciousness X   Speech/ Communication Delayed Responses;Dysarthric   Level of Consciousness Confused   Ability To Follow Commands 1 Step   Safety Awareness Impaired;Impulsive   Attention Impaired   Comments pleasantly confused, [place], however did become slightly agitated/overstimulated towards end of session -fixated on lines   Active ROM Upper Body   Active ROM Upper Body  WDL   Strength Upper Body   Upper Body Strength  WDL   Bed Mobility    Supine to Sit Maximal Assist   Sit to Supine Minimal Assist   Scooting Minimal Assist   Activities of Daily Living   Grooming Moderate Assist;Seated   Lower Body Dressing Minimal Assist  (doff SCD garments)   Functional Mobility   Sit to Stand Minimal Assist   Bed, Chair, Wheelchair Transfer Unable to Participate   Mobility EOB>STS x2>BTB   Short Term Goals   Short Term Goal # 1 Pt will groom seated with min assist    Goal Outcome # 1 Progressing as expected   Short Term Goal # 2 Pt will complete ADL transfers with min assist    Goal Outcome # 2 Progressing as expected   Short Term Goal # 3 Pt will dress LB with min assist    Goal Outcome # 3 Progressing as expected

## 2020-09-18 ENCOUNTER — APPOINTMENT (OUTPATIENT)
Dept: RADIOLOGY | Facility: MEDICAL CENTER | Age: 75
DRG: 643 | End: 2020-09-18
Attending: NURSE PRACTITIONER
Payer: MEDICARE

## 2020-09-18 ENCOUNTER — APPOINTMENT (OUTPATIENT)
Dept: RADIOLOGY | Facility: MEDICAL CENTER | Age: 75
DRG: 643 | End: 2020-09-18
Attending: INTERNAL MEDICINE
Payer: MEDICARE

## 2020-09-18 LAB
ANION GAP SERPL CALC-SCNC: 10 MMOL/L (ref 7–16)
ANION GAP SERPL CALC-SCNC: 13 MMOL/L (ref 7–16)
ANION GAP SERPL CALC-SCNC: 15 MMOL/L (ref 7–16)
ANION GAP SERPL CALC-SCNC: 15 MMOL/L (ref 7–16)
BASOPHILS # BLD AUTO: 0.5 % (ref 0–1.8)
BASOPHILS # BLD: 0.05 K/UL (ref 0–0.12)
BUN SERPL-MCNC: 14 MG/DL (ref 8–22)
BUN SERPL-MCNC: 15 MG/DL (ref 8–22)
BUN SERPL-MCNC: 16 MG/DL (ref 8–22)
BUN SERPL-MCNC: 19 MG/DL (ref 8–22)
CALCIUM SERPL-MCNC: 9.1 MG/DL (ref 8.5–10.5)
CALCIUM SERPL-MCNC: 9.1 MG/DL (ref 8.5–10.5)
CALCIUM SERPL-MCNC: 9.3 MG/DL (ref 8.5–10.5)
CALCIUM SERPL-MCNC: 9.7 MG/DL (ref 8.5–10.5)
CHLORIDE SERPL-SCNC: 76 MMOL/L (ref 96–112)
CHLORIDE SERPL-SCNC: 78 MMOL/L (ref 96–112)
CHLORIDE SERPL-SCNC: 80 MMOL/L (ref 96–112)
CHLORIDE SERPL-SCNC: 81 MMOL/L (ref 96–112)
CO2 SERPL-SCNC: 29 MMOL/L (ref 20–33)
CO2 SERPL-SCNC: 29 MMOL/L (ref 20–33)
CO2 SERPL-SCNC: 31 MMOL/L (ref 20–33)
CO2 SERPL-SCNC: 31 MMOL/L (ref 20–33)
CREAT SERPL-MCNC: 0.38 MG/DL (ref 0.5–1.4)
CREAT SERPL-MCNC: 0.39 MG/DL (ref 0.5–1.4)
CREAT SERPL-MCNC: 0.49 MG/DL (ref 0.5–1.4)
CREAT SERPL-MCNC: 0.62 MG/DL (ref 0.5–1.4)
CREAT UR-MCNC: 20.84 MG/DL
CREAT UR-MCNC: 21.11 MG/DL
EOSINOPHIL # BLD AUTO: 0.19 K/UL (ref 0–0.51)
EOSINOPHIL NFR BLD: 2 % (ref 0–6.9)
ERYTHROCYTE [DISTWIDTH] IN BLOOD BY AUTOMATED COUNT: 47.1 FL (ref 35.9–50)
FERRITIN SERPL-MCNC: 554 NG/ML (ref 22–322)
GLUCOSE SERPL-MCNC: 107 MG/DL (ref 65–99)
GLUCOSE SERPL-MCNC: 113 MG/DL (ref 65–99)
GLUCOSE SERPL-MCNC: 127 MG/DL (ref 65–99)
GLUCOSE SERPL-MCNC: 130 MG/DL (ref 65–99)
HCT VFR BLD AUTO: 34.2 % (ref 42–52)
HGB BLD-MCNC: 12.3 G/DL (ref 14–18)
IRON SATN MFR SERPL: 9 % (ref 15–55)
IRON SERPL-MCNC: 22 UG/DL (ref 50–180)
LYMPHOCYTES # BLD AUTO: 1.06 K/UL (ref 1–4.8)
LYMPHOCYTES NFR BLD: 11.2 % (ref 22–41)
MAGNESIUM SERPL-MCNC: 1.4 MG/DL (ref 1.5–2.5)
MAGNESIUM SERPL-MCNC: 1.6 MG/DL (ref 1.5–2.5)
MAGNESIUM SERPL-MCNC: 2.3 MG/DL (ref 1.5–2.5)
MAGNESIUM SERPL-MCNC: 2.7 MG/DL (ref 1.5–2.5)
MCH RBC QN AUTO: 35.9 PG (ref 27–33)
MCHC RBC AUTO-ENTMCNC: 35.9 G/DL (ref 33.7–35.3)
MCV RBC AUTO: 100 FL (ref 81.4–97.8)
MONOCYTES # BLD AUTO: 1.31 K/UL (ref 0–0.85)
MONOCYTES NFR BLD AUTO: 13.8 % (ref 0–13.4)
NEUTROPHILS # BLD AUTO: 6.8 K/UL (ref 1.82–7.42)
NEUTROPHILS NFR BLD: 71.9 % (ref 44–72)
OSMOLALITY UR: 350 MOSM/KG H2O (ref 300–900)
OSMOLALITY UR: 371 MOSM/KG H2O (ref 300–900)
PLATELET # BLD AUTO: 460 K/UL (ref 164–446)
PMV BLD AUTO: 10.4 FL (ref 9–12.9)
POTASSIUM SERPL-SCNC: 3.2 MMOL/L (ref 3.6–5.5)
POTASSIUM SERPL-SCNC: 3.3 MMOL/L (ref 3.6–5.5)
POTASSIUM SERPL-SCNC: 3.5 MMOL/L (ref 3.6–5.5)
POTASSIUM SERPL-SCNC: 3.9 MMOL/L (ref 3.6–5.5)
POTASSIUM UR-SCNC: 31 MMOL/L
PROT UR-MCNC: 10 MG/DL (ref 0–15)
PROT/CREAT UR: 480 MG/G (ref 15–68)
RBC # BLD AUTO: 3.45 M/UL (ref 4.7–6.1)
SODIUM SERPL-SCNC: 120 MMOL/L (ref 135–145)
SODIUM SERPL-SCNC: 121 MMOL/L (ref 135–145)
SODIUM SERPL-SCNC: 123 MMOL/L (ref 135–145)
SODIUM SERPL-SCNC: 124 MMOL/L (ref 135–145)
SODIUM UR-SCNC: 111 MMOL/L
TIBC SERPL-MCNC: 244 UG/DL (ref 250–450)
UIBC SERPL-MCNC: 222 UG/DL (ref 110–370)
WBC # BLD AUTO: 9.5 K/UL (ref 4.8–10.8)

## 2020-09-18 PROCEDURE — 82728 ASSAY OF FERRITIN: CPT

## 2020-09-18 PROCEDURE — 84155 ASSAY OF PROTEIN SERUM: CPT

## 2020-09-18 PROCEDURE — 97112 NEUROMUSCULAR REEDUCATION: CPT

## 2020-09-18 PROCEDURE — 83550 IRON BINDING TEST: CPT

## 2020-09-18 PROCEDURE — 36415 COLL VENOUS BLD VENIPUNCTURE: CPT

## 2020-09-18 PROCEDURE — 83935 ASSAY OF URINE OSMOLALITY: CPT | Mod: 91

## 2020-09-18 PROCEDURE — 83540 ASSAY OF IRON: CPT

## 2020-09-18 PROCEDURE — 84105 ASSAY OF URINE PHOSPHORUS: CPT

## 2020-09-18 PROCEDURE — 82784 ASSAY IGA/IGD/IGG/IGM EACH: CPT

## 2020-09-18 PROCEDURE — 83520 IMMUNOASSAY QUANT NOS NONAB: CPT

## 2020-09-18 PROCEDURE — 83735 ASSAY OF MAGNESIUM: CPT | Mod: 91

## 2020-09-18 PROCEDURE — 74018 RADEX ABDOMEN 1 VIEW: CPT

## 2020-09-18 PROCEDURE — 99233 SBSQ HOSP IP/OBS HIGH 50: CPT | Performed by: INTERNAL MEDICINE

## 2020-09-18 PROCEDURE — 84300 ASSAY OF URINE SODIUM: CPT

## 2020-09-18 PROCEDURE — 82570 ASSAY OF URINE CREATININE: CPT

## 2020-09-18 PROCEDURE — 84133 ASSAY OF URINE POTASSIUM: CPT

## 2020-09-18 PROCEDURE — 84165 PROTEIN E-PHORESIS SERUM: CPT

## 2020-09-18 PROCEDURE — 700111 HCHG RX REV CODE 636 W/ 250 OVERRIDE (IP): Performed by: INTERNAL MEDICINE

## 2020-09-18 PROCEDURE — 99223 1ST HOSP IP/OBS HIGH 75: CPT | Performed by: PSYCHIATRY & NEUROLOGY

## 2020-09-18 PROCEDURE — 84156 ASSAY OF PROTEIN URINE: CPT

## 2020-09-18 PROCEDURE — 97530 THERAPEUTIC ACTIVITIES: CPT

## 2020-09-18 PROCEDURE — 86334 IMMUNOFIX E-PHORESIS SERUM: CPT

## 2020-09-18 PROCEDURE — 770020 HCHG ROOM/CARE - TELE (206)

## 2020-09-18 PROCEDURE — 700105 HCHG RX REV CODE 258: Performed by: NURSE PRACTITIONER

## 2020-09-18 PROCEDURE — 80048 BASIC METABOLIC PNL TOTAL CA: CPT

## 2020-09-18 PROCEDURE — 92526 ORAL FUNCTION THERAPY: CPT

## 2020-09-18 RX ORDER — POTASSIUM CHLORIDE 7.45 MG/ML
10 INJECTION INTRAVENOUS
Status: COMPLETED | OUTPATIENT
Start: 2020-09-18 | End: 2020-09-18

## 2020-09-18 RX ORDER — FUROSEMIDE 10 MG/ML
60 INJECTION INTRAMUSCULAR; INTRAVENOUS
Status: DISCONTINUED | OUTPATIENT
Start: 2020-09-18 | End: 2020-09-18

## 2020-09-18 RX ORDER — SODIUM CHLORIDE 9 MG/ML
INJECTION, SOLUTION INTRAVENOUS CONTINUOUS
Status: DISCONTINUED | OUTPATIENT
Start: 2020-09-18 | End: 2020-09-20

## 2020-09-18 RX ORDER — MAGNESIUM SULFATE HEPTAHYDRATE 40 MG/ML
4 INJECTION, SOLUTION INTRAVENOUS ONCE
Status: COMPLETED | OUTPATIENT
Start: 2020-09-18 | End: 2020-09-18

## 2020-09-18 RX ORDER — LORAZEPAM 2 MG/ML
1 INJECTION INTRAMUSCULAR
Status: COMPLETED | OUTPATIENT
Start: 2020-09-18 | End: 2020-09-18

## 2020-09-18 RX ADMIN — LORAZEPAM 1 MG: 2 INJECTION INTRAMUSCULAR; INTRAVENOUS at 14:46

## 2020-09-18 RX ADMIN — SODIUM CHLORIDE: 9 INJECTION, SOLUTION INTRAVENOUS at 23:27

## 2020-09-18 RX ADMIN — POTASSIUM CHLORIDE 10 MEQ: 7.46 INJECTION, SOLUTION INTRAVENOUS at 13:38

## 2020-09-18 RX ADMIN — POTASSIUM CHLORIDE 10 MEQ: 7.46 INJECTION, SOLUTION INTRAVENOUS at 12:24

## 2020-09-18 RX ADMIN — LABETALOL HYDROCHLORIDE 10 MG: 5 INJECTION, SOLUTION INTRAVENOUS at 06:38

## 2020-09-18 RX ADMIN — LABETALOL HYDROCHLORIDE 10 MG: 5 INJECTION, SOLUTION INTRAVENOUS at 20:39

## 2020-09-18 RX ADMIN — FUROSEMIDE 60 MG: 10 INJECTION, SOLUTION INTRAMUSCULAR; INTRAVENOUS at 08:58

## 2020-09-18 RX ADMIN — MAGNESIUM SULFATE IN WATER 4 G: 40 INJECTION, SOLUTION INTRAVENOUS at 09:34

## 2020-09-18 RX ADMIN — LABETALOL HYDROCHLORIDE 10 MG: 5 INJECTION, SOLUTION INTRAVENOUS at 04:58

## 2020-09-18 ASSESSMENT — GAIT ASSESSMENTS
GAIT LEVEL OF ASSIST: MINIMAL ASSIST
ASSISTIVE DEVICE: HAND HELD ASSIST
DISTANCE (FEET): 6

## 2020-09-18 ASSESSMENT — COGNITIVE AND FUNCTIONAL STATUS - GENERAL
MOVING TO AND FROM BED TO CHAIR: A LITTLE
MOBILITY SCORE: 17
WALKING IN HOSPITAL ROOM: A LOT
STANDING UP FROM CHAIR USING ARMS: A LITTLE
SUGGESTED CMS G CODE MODIFIER MOBILITY: CK
CLIMB 3 TO 5 STEPS WITH RAILING: A LOT
MOVING FROM LYING ON BACK TO SITTING ON SIDE OF FLAT BED: A LITTLE

## 2020-09-18 NOTE — THERAPY
Speech Language Pathology  Daily Treatment     Patient Name: Jordan Alexander  Age:  75 y.o., Sex:  male  Medical Record #: [id number]  Today's Date: 9/18/2020     Precautions  Precautions: (P) Fall Risk, Swallow Precautions ( See Comments)  Comments: (P) GLF PTA    Assessment    Estella replaced multiple times and replaced earlier today. Pt with severe low electrolytes per EMR. Severe confused speech with intermittent speech that is communicative. Pt's speech is nearly 100% intelligible and with good intensity. Nurs aware and SLP also observed, frequent times when pt with eyes open, with gazing past SLP, and not responding briefly. Pt given single ice chips and 1/3 tsp amounts of NTL juice by spoon. Pt requires mod max cues to close his mouth and to swallow x2. Variable triggering of the swallow from 1-2 seconds to greater than 10 seconds. Two episodes of brief throat clearing with pt able to follow directives for a delayed dry swallow. SLP collaborated with nurs regarding cont NPO and NG. Pt is not at the level for a diagnostic related to his current medical status and severe confusion.     Plan    Continue current treatment plan.    Discharge Recommendations: (P) Recommend post-acute placement for additional speech therapy services prior to discharge home       09/18/20 1015   Voice   Comments clear qualtiy and good intensity.    Dysphagia    Dysphagia X   Positioning / Behavior Modification Multiple Swallows;Modulate Rate or Bite Size  (sitting up at 90 degrees)   Diet / Liquid Recommendation NPO;Pre-Feeding Trials with SLP Only   Nutritional Liquid Intake Rating Scale Nothing by mouth   Nutritional Food Intake Rating Scale Nothing by mouth   Skilled Intervention Verbal Cueing   Recommended Route of Medication Administration   Medication Administration  Via Gastric Tube   Patient / Family Goals   Patient / Family Goal #1 pt unable to formulate   Short Term Goals   Short Term Goal # 1 New 9/14: Pt will trigger a  consistent swallow to palpation, within 3-5 seconds, working 1:1 with SLP during prefeeding trials.    Goal Outcome # 1 Progressing slower than expected   Short Term Goal # 2 Revised 9/14: The patient will consume prefeeding trials 1:1 with SLP without signs of aspiration or oral confusion, given moderate cueing   Goal Outcome # 2  Progressing slower than expected   Session Information   Priority 3  (3x,metabolicencephal,severe labs, NG/NPO,not readydx)

## 2020-09-18 NOTE — PROGRESS NOTES
Hospital Medicine Daily Progress Note    Date of Service  9/18/2020    Chief Complaint  75 y.o. male admitted 9/12/2020 with altered mental status.     Hospital Course    75 y.o. male who presented 9/12/2020 with altered mental status.  Apparently the patient was transferred for evaluation of altered mental status that started this morning progressively got worse was associated with some right-sided weakness that started around 11:05 a.m..  The patient was transferred as a code stroke he was evaluated by neurology and felt that his symptoms are likely related to hypertensive and possibly metabolic encephalopathy.      Interval Problem Update  Cortrak lost overnight, complicating electrolyte replacement.  Multiple attempts made by nursing staff with no success.  IR unable to replace under fluoroscopy.  GI unable to place until Sunday or later, delayed by Eliquis.  We will continue attempts by nursing staff, utilizing Ativan to minimize regurgitation.  Consulted nephrology this morning, suspect SIADH.  Appreciate recommendations.  Discussed with neurology, appreciate assessment that encephalopathy is most likely secondary to hyponatremia.    Discussed his medication list with pharmacist.  He is not on any medications that are known to cause SIADH.  Current approach will be to optimize volume status.    Consultants/Specialty  neuro    Code Status  Full Code    Disposition  CM and wife working on choice for SNF placement    Review of Systems  Review of Systems   Unable to perform ROS: Mental status change        Physical Exam  Temp:  [36.3 °C (97.3 °F)-36.8 °C (98.2 °F)] 36.8 °C (98.2 °F)  Pulse:  [64-79] 66  Resp:  [16-18] 16  BP: (121-184)/() 121/70  SpO2:  [91 %-99 %] 99 %    Physical Exam  Vitals signs reviewed.   Constitutional:       General: He is not in acute distress.     Appearance: He is ill-appearing.   HENT:      Head: Normocephalic and atraumatic.      Nose: No congestion.      Mouth/Throat:       Mouth: Mucous membranes are moist.   Eyes:      Extraocular Movements: Extraocular movements intact.      Pupils: Pupils are equal, round, and reactive to light.   Neck:      Musculoskeletal: Neck supple.   Cardiovascular:      Rate and Rhythm: Normal rate and regular rhythm.      Pulses: Normal pulses.      Heart sounds: Normal heart sounds.   Pulmonary:      Effort: Pulmonary effort is normal. No respiratory distress.      Breath sounds: Normal breath sounds. No wheezing.   Abdominal:      General: Bowel sounds are normal. There is no distension.      Palpations: Abdomen is soft.      Tenderness: There is no abdominal tenderness.   Musculoskeletal:         General: No swelling.   Skin:     General: Skin is warm and dry.      Capillary Refill: Capillary refill takes less than 2 seconds.   Neurological:      Mental Status: He is alert. He is disoriented and confused.      Motor: Motor function is intact.   Psychiatric:         Attention and Perception: Attention normal.         Mood and Affect: Affect is inappropriate.         Speech: Speech is delayed and tangential.         Behavior: Behavior is uncooperative and withdrawn. Behavior is not agitated or aggressive.         Fluids    Intake/Output Summary (Last 24 hours) at 9/18/2020 1332  Last data filed at 9/18/2020 1200  Gross per 24 hour   Intake 60 ml   Output 1950 ml   Net -1890 ml       Laboratory  Recent Labs     09/17/20  1742   WBC 9.5   RBC 3.45*   HEMOGLOBIN 12.3*   HEMATOCRIT 34.2*   .0*   MCH 35.9*   MCHC 35.9*   RDW 47.1   PLATELETCT 460*   MPV 10.4     Recent Labs     09/18/20  0058 09/18/20  0703 09/18/20  1216   SODIUM 123* 121* 124*   POTASSIUM 3.5* 3.2* 3.3*   CHLORIDE 81* 80* 78*   CO2 29 31 31   GLUCOSE 130* 127* 113*   BUN 15 14 16   CREATININE 0.39* 0.38* 0.49*   CALCIUM 9.1 9.1 9.3                   Imaging  None new in past 24h     Assessment/Plan  * Acute encephalopathy- (present on admission)  Assessment & Plan  Etiology is not  entirely clear patient evaluated by neurology not felt to be a TPA candidate.  Possible hypertensive encephalopathy and some component of metabolic encephalopathy.  CT head and CTA head and neck reviewed.  No evidence of acute infection, procal wnl.  MRI of brain with chronic but no acute abnormalities.   EEG with encephalopathy, no seizure captured.  Not polypharmacy given med list.  Cont with Prn BP medications.  Continue pt/ot/speech therapies  Most likely etiology is hyponatremia.  We will continue to treat this underlying cause as noted.    Hypokalemia- (present on admission)  Assessment & Plan  Improved with supplementation.  Replete and monitor closely.    Hyponatremia- (present on admission)  Assessment & Plan  Most likely cause of his acute encephalopathy.  Low serum osm, likely hypovolemic on presentation, but did not improve with NS. No new urine osm collected, was high on admission.  High urine sodium may suggest SIADH, but this is a diagnosis of exclusion and there is no clear provoking factor for SIADH.  Free water flushes are being held and free water components of IV medications minimized.  On 9/16, physical exam was difficult due to patient's mental status, but elevated BNP suggested hypervolemia.  No improvement with furosemide.  Continue to closely monitor scheduled labs q6h.  Nephrology consulted, appreciate recommendations.    Hypomagnesemia- (present on admission)  Assessment & Plan  Replete and monitor closely.    Paroxysmal atrial fibrillation (HCC)- (present on admission)  Assessment & Plan  Continue Eliquis and sotalol. Sotalol being held for hypokalemia.    Dyslipidemia- (present on admission)  Assessment & Plan  Continue simvastatin    Back pain- (present on admission)  Assessment & Plan  unremarkbale on xray       VTE prophylaxis: eliquis

## 2020-09-18 NOTE — PROGRESS NOTES
Messaged doctor Alvaro about d/c IV fluids with a sodium of 122 wants to see how he responds to lasix.

## 2020-09-18 NOTE — CONSULTS
"[facility] -  CONSULTATION NOTE               Author: Mesfin Alanis M.D. Date & Time: 9/18/2020  2:37 PM       REASON FOR CONSULTATION:   - Hyponatremia    CHIEF COMPLAINT:   -  \"Low Sodium  \"    HISTORY OF PRESENT ILLNESS:    Per report as patient is not able to give history, patient is a 75 y.o. male who presented 9/12/2020 with altered mental status.  Apparently the patient was transferred for evaluation of altered mental status a few days ago progressively got worse was associated with some right-sided weakness that started in the morning.  Also reported to have a fall a week prior to presentation.  The patient was transferred as a code stroke he was evaluated by neurology and felt that his symptoms are likely related to hypertensive and possibly metabolic encephalopathy.  Etiology of encephalopathy was unclear thought maybe metabolic ?hyponatremia as cause.  EEG with no seizures, no evidence of infection.  Imaging with no evidence of acute infarct.    Nephrology consulted for hyponatremia.      Patient seen at bedside was AOx3 per nurse but now sleeping from Ativan.      REVIEW OF SYSTEMS:    Review of Systems   Unable to perform ROS: Acuity of condition         PAST MEDICAL HISTORY:   pAfib  Dyslipidemia  Back Pain      PAST SURGICAL HISTORY:   No past surgical history on file.      FAMILY HISTORY:   - Reviewed and non contributory to current illness    SOCIAL HISTORY:   Social History     Socioeconomic History   • Marital status:      Spouse name: Not on file   • Number of children: Not on file   • Years of education: Not on file   • Highest education level: Not on file   Occupational History   • Not on file   Social Needs   • Financial resource strain: Not on file   • Food insecurity     Worry: Not on file     Inability: Not on file   • Transportation needs     Medical: Not on file     Non-medical: Not on file   Tobacco Use   • Smoking status: Former Smoker   • Smokeless " "tobacco: Never Used   Substance and Sexual Activity   • Alcohol use: Not on file   • Drug use: Not on file   • Sexual activity: Not on file   Lifestyle   • Physical activity     Days per week: Not on file     Minutes per session: Not on file   • Stress: Not on file   Relationships   • Social connections     Talks on phone: Not on file     Gets together: Not on file     Attends Latter day service: Not on file     Active member of club or organization: Not on file     Attends meetings of clubs or organizations: Not on file     Relationship status: Not on file   • Intimate partner violence     Fear of current or ex partner: Not on file     Emotionally abused: Not on file     Physically abused: Not on file     Forced sexual activity: Not on file   Other Topics Concern   • Not on file   Social History Narrative   • Not on file       HOME MEDICATIONS:   - Reviewed and documented in chart    LABORATORY STUDIES:   - Reviewed and documented in chart    ALLERGIES:  Patient has no known allergies.    VS:  /70   Pulse 66   Temp 36.8 °C (98.2 °F) (Temporal)   Resp 16   Ht 1.702 m (5' 7.01\")   Wt 77.4 kg (170 lb 10.2 oz)   SpO2 99%   BMI 26.72 kg/m²   Physical Exam  Constitutional:       Comments: Asleep, difficult to arouse   HENT:      Head: Normocephalic and atraumatic.      Nose: Nose normal.      Mouth/Throat:      Mouth: Mucous membranes are moist.   Cardiovascular:      Rate and Rhythm: Normal rate and regular rhythm.      Pulses: Normal pulses.      Heart sounds: No murmur.   Pulmonary:      Effort: Pulmonary effort is normal.   Abdominal:      General: Abdomen is flat. There is no distension.      Palpations: Abdomen is soft.      Tenderness: There is no abdominal tenderness. There is no guarding.   Skin:     General: Skin is warm and dry.   Neurological:      Comments: Asleep         LABS:  Recent Results (from the past 24 hour(s))   Basic Metabolic Panel    Collection Time: 09/17/20  5:42 PM   Result Value " Ref Range    Sodium 122 (L) 135 - 145 mmol/L    Potassium 3.9 3.6 - 5.5 mmol/L    Chloride 85 (L) 96 - 112 mmol/L    Co2 25 20 - 33 mmol/L    Glucose 127 (H) 65 - 99 mg/dL    Bun 14 8 - 22 mg/dL    Creatinine 0.36 (L) 0.50 - 1.40 mg/dL    Calcium 8.9 8.5 - 10.5 mg/dL    Anion Gap 12.0 7.0 - 16.0   MAGNESIUM    Collection Time: 09/17/20  5:42 PM   Result Value Ref Range    Magnesium 1.9 1.5 - 2.5 mg/dL   CBC WITH DIFFERENTIAL    Collection Time: 09/17/20  5:42 PM   Result Value Ref Range    WBC 9.5 4.8 - 10.8 K/uL    RBC 3.45 (L) 4.70 - 6.10 M/uL    Hemoglobin 12.3 (L) 14.0 - 18.0 g/dL    Hematocrit 34.2 (L) 42.0 - 52.0 %    .0 (H) 81.4 - 97.8 fL    MCH 35.9 (H) 27.0 - 33.0 pg    MCHC 35.9 (H) 33.7 - 35.3 g/dL    RDW 47.1 35.9 - 50.0 fL    Platelet Count 460 (H) 164 - 446 K/uL    MPV 10.4 9.0 - 12.9 fL    Neutrophils-Polys 71.90 44.00 - 72.00 %    Lymphocytes 11.20 (L) 22.00 - 41.00 %    Monocytes 13.80 (H) 0.00 - 13.40 %    Eosinophils 2.00 0.00 - 6.90 %    Basophils 0.50 0.00 - 1.80 %    Neutrophils (Absolute) 6.80 1.82 - 7.42 K/uL    Lymphs (Absolute) 1.06 1.00 - 4.80 K/uL    Monos (Absolute) 1.31 (H) 0.00 - 0.85 K/uL    Eos (Absolute) 0.19 0.00 - 0.51 K/uL    Baso (Absolute) 0.05 0.00 - 0.12 K/uL   ESTIMATED GFR    Collection Time: 09/17/20  5:42 PM   Result Value Ref Range    GFR If African American >60 >60 mL/min/1.73 m 2    GFR If Non African American >60 >60 mL/min/1.73 m 2   OSMOLALITY URINE    Collection Time: 09/18/20 12:30 AM   Result Value Ref Range    Osmolality Urine 371 300 - 900 mOsm/kg H2O   Basic Metabolic Panel    Collection Time: 09/18/20 12:58 AM   Result Value Ref Range    Sodium 123 (L) 135 - 145 mmol/L    Potassium 3.5 (L) 3.6 - 5.5 mmol/L    Chloride 81 (L) 96 - 112 mmol/L    Co2 29 20 - 33 mmol/L    Glucose 130 (H) 65 - 99 mg/dL    Bun 15 8 - 22 mg/dL    Creatinine 0.39 (L) 0.50 - 1.40 mg/dL    Calcium 9.1 8.5 - 10.5 mg/dL    Anion Gap 13.0 7.0 - 16.0   MAGNESIUM    Collection Time:  09/18/20 12:58 AM   Result Value Ref Range    Magnesium 1.6 1.5 - 2.5 mg/dL   ESTIMATED GFR    Collection Time: 09/18/20 12:58 AM   Result Value Ref Range    GFR If African American >60 >60 mL/min/1.73 m 2    GFR If Non African American >60 >60 mL/min/1.73 m 2   Basic Metabolic Panel    Collection Time: 09/18/20  7:03 AM   Result Value Ref Range    Sodium 121 (L) 135 - 145 mmol/L    Potassium 3.2 (L) 3.6 - 5.5 mmol/L    Chloride 80 (L) 96 - 112 mmol/L    Co2 31 20 - 33 mmol/L    Glucose 127 (H) 65 - 99 mg/dL    Bun 14 8 - 22 mg/dL    Creatinine 0.38 (L) 0.50 - 1.40 mg/dL    Calcium 9.1 8.5 - 10.5 mg/dL    Anion Gap 10.0 7.0 - 16.0   MAGNESIUM    Collection Time: 09/18/20  7:03 AM   Result Value Ref Range    Magnesium 1.4 (L) 1.5 - 2.5 mg/dL   ESTIMATED GFR    Collection Time: 09/18/20  7:03 AM   Result Value Ref Range    GFR If African American >60 >60 mL/min/1.73 m 2    GFR If Non African American >60 >60 mL/min/1.73 m 2   Basic Metabolic Panel    Collection Time: 09/18/20 12:16 PM   Result Value Ref Range    Sodium 124 (L) 135 - 145 mmol/L    Potassium 3.3 (L) 3.6 - 5.5 mmol/L    Chloride 78 (L) 96 - 112 mmol/L    Co2 31 20 - 33 mmol/L    Glucose 113 (H) 65 - 99 mg/dL    Bun 16 8 - 22 mg/dL    Creatinine 0.49 (L) 0.50 - 1.40 mg/dL    Calcium 9.3 8.5 - 10.5 mg/dL    Anion Gap 15.0 7.0 - 16.0   MAGNESIUM    Collection Time: 09/18/20 12:16 PM   Result Value Ref Range    Magnesium 2.3 1.5 - 2.5 mg/dL   ESTIMATED GFR    Collection Time: 09/18/20 12:16 PM   Result Value Ref Range    GFR If African American >60 >60 mL/min/1.73 m 2    GFR If Non African American >60 >60 mL/min/1.73 m 2   URINE SODIUM RANDOM    Collection Time: 09/18/20  1:41 PM   Result Value Ref Range    Sodium, Urine -per volume 111 mmol/L   URINE POTASSIUM RANDOM    Collection Time: 09/18/20  1:41 PM   Result Value Ref Range    Potassium 31.0 mmol/L   PROTEIN/CREAT RATIO URINE    Collection Time: 09/18/20  1:41 PM   Result Value Ref Range    Total  Protein, Urine 10.0 0.0 - 15.0 mg/dL    Creatinine, Random Urine 20.84 mg/dL    Protein Creatinine Ratio 480 (H) 15 - 68 mg/g       (click the triangle to expand results)    IMAGING:  [id number] VIEW   Final Result      Feeding tube tip projects over the distal esophagus, near the level of the gastroesophageal junction      DX-CHEST-PORTABLE (1 VIEW)   Final Result         Decreased mild interstitial pulmonary edema.      DX-ABDOMEN FOR TUBE PLACEMENT   Final Result         Feeding tube with tip projecting over the expected area of the proximal stomach.      MR-BRAIN-W/O   Final Result      1.  No evidence of acute territorial infarct, intracranial hemorrhage or mass lesion.   2.  Moderate diffuse cerebral substance loss.   3.  Moderate microangiopathic ischemic change versus demyelination or gliosis.   4.  Chronic ischemic pontine gliosis.   5.  Small focus of chronic hemosiderin deposition within the left inferior frontal lobe likely related to remote hemorrhagic infarct or trauma.      DX-THORACOLUMBAR SPINE-2 VIEWS   Final Result      1.  No acute fracture or dislocation identified.      2.  Degenerative changes are noted.      DX-CHEST-PORTABLE (1 VIEW)   Final Result      1.  Cardiomegaly.      2.  Mild bilateral patchy pulmonary infiltrates.      CT-CTA NECK WITH & W/O-POST PROCESSING   Final Result      1.  Atherosclerotic plaque involving the common and internal carotid arteries bilaterally most prominently seen at the bifurcations. This results in less than 50% diameter narrowing bilaterally.      2.  Atherosclerotic plaque involving the vertebral arteries bilaterally. No evidence of occlusion.      CT-CEREBRAL PERFUSION ANALYSIS   Final Result      1.  Cerebral blood flow less than 30% likely representing completed infarct = 0 mL.      2.  T Max more than 6 seconds likely representing combination of completed infarct and ischemia = 267 mL.      3.  Mismatched volume likely representing ischemic  brain/penumbra = 267 mL      4.  Please note that the cerebral perfusion was performed on the limited brain tissue around the basal ganglia region. Infarct/ischemia outside the CT perfusion sections can be missed in this study.      CT-CTA HEAD WITH & W/O-POST PROCESS   Final Result      No evidence of intracranial vascular occlusion or aneurysm.      CT-HEAD W/O   Final Result         NO ACUTE ABNORMALITIES ARE NOTED ON CT SCAN OF THE HEAD.      Findings are consistent with atrophy.  Decreased attenuation in the periventricular white matter likely indicates microvascular ischemic disease.          IMPRESSION:  - Hyponatremia    * Urine sodium elevated and initially with elevated urine osm as well 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, unclear cause, unclear if he's having pain or nausea    * Patient is euvolemic on exam with low serum osm    * However, other things to consider include cerebral salt wasting but patient generally would be hypovolemic on exam, with more UOP    * Sodium seems to have improved with holding tube feeds this morning    * ?Fanconi given elevated urine glucose with no history of diabetes  - Proteinuria    * Given slightly elevated urine protein and anemia will send for SPEP/ UPEP MATTIE    * Small blood in urine likely from enrique, don't suspect GN    * Can repeat UA when enrique is removed  - Acute Encephalopathy    * Unclear cause    * Continue management per primary team and neurology  - Anemia    * Iron panel  - Hypokalemia    * Possibly from diuretics and hypomagnesemia    * Hold diuretics for now continue with repletion  - Hypomagnesemia    * Resolved    * Replete PRN  - Paroxysmal Atrial Fibrillation    * management per primary team  - HTN    * More recently blood pressure is in a better range    * Target goal per neurology  - Dyslipidemia  - Back Pain    PLAN:  - Free water restrict to 1.2L daily if possible  - Start salt tablets 2g TID  - Accurate I/Os while on tube feeds  - Avoid nephrotoxic agents  -  Follow up SPEP/UPEP, light chains    All prior notes form other doctors and RN staff were reviewed from admission to current day to help me make my clinical decisions    Thank you for the consultation!

## 2020-09-18 NOTE — PROGRESS NOTES
Report received from Jazmyne. Running feed @ 65. Pt awake in bed A&Ox1; no complaints at this time. POC discussed; all questions answered. Bed locked in lowest position; call light in reach; bed alarm in use.

## 2020-09-18 NOTE — PROGRESS NOTES
0900: Per previous shift RN, unable to establish cortrak after 2 unsuccessful attempts during his shift and only able to advance tube to 50 cm before pt begins coughing and retching. Tube placement attempted by AVEL Coello at 0900, tube at 45 cm    1100: Chest x ray shows tube is in esophagus. Cortrak placement re attempted by AVEL Coello, unable to advance past 50 cm without meeting resistance and pt retching and coughing, which causes tube to be pushed out. Notified Dr. John  Who stated she would try to find another way for it to get placed.    1315: :  Cortrak placement re attempted by AVEL Cid who got tube to 75 cm but then pt coughed and retched and tube was pushed out to 50 cm, unable to advance tube further due to coughing/retching. Notified Dr. John, Dr. John stated IR would not be able to place tube and Gastroenterology can't place tube until Sunday, received orders for 1 mg IV ativan for cortrak tube placement to help prevent retching/coughing.     1415: Pt more lethargic, unable to answer questions, arrouses to vigorous tactile stimulation, oxygen saturations remaining 90% or above, no signs of respiratory depression.     1500: Wife at bedside, answering all questions about pt status and plan of care.     1600: Tube placement reattempted by AVEL Cid. Tube secured with Bridal at 50 cm, chest x ray ordered.     1800: Notified Dr. John that pt's lethargy is slightly improved but still present. Notified Dr. John of attempted tube placement and waiting for chest x ray pending. Dr. John stated that if tube is not in correct spot by night shift, night shift should reeattempt placement.     1930: Pt becoming more alert, still unable to answer all questions or follow all commands, but alert enough to  stand and pivot back to bed max assist. Opening eyes to verbal stimulation. Notified night shift RN that Chest x ray reccomends advancement of cortrak and that Dr. John asked for night shift team to reattempt  placement.

## 2020-09-18 NOTE — CARE PLAN
Problem: Communication  Goal: The ability to communicate needs accurately and effectively will improve  Outcome: PROGRESSING SLOWER THAN EXPECTED   Pt intermittently confused; requires staff prompting for assessment of needs.    Problem: Urinary Elimination:  Goal: Ability to reestablish a normal urinary elimination pattern will improve  Outcome: PROGRESSING SLOWER THAN EXPECTED   Pt having urinary incontinence; condom cath in use.    Problem: Safety - Medical Restraint  Goal: Free from restraint(s) (Restraint for Interference with Medical Device)  Description: INTERVENTIONS:  1. ONCE/SHIFT or MINIMUM Q12H: Assess and document the continuing need for restraints  2. Q24H: Continued use of restraint requires LIP to perform face to face examination and written order  3. Identify and implement measures to help patient regain control  Outcome: PROGRESSING SLOWER THAN EXPECTED   Pt 2 person assist to stand; generalized weakness/balance issues.    Problem: Mobility  Goal: Risk for activity intolerance will decrease  Outcome: PROGRESSING SLOWER THAN EXPECTED   Pt has generalized weakness in BLE; unsteady on feet.

## 2020-09-19 ENCOUNTER — APPOINTMENT (OUTPATIENT)
Dept: RADIOLOGY | Facility: MEDICAL CENTER | Age: 75
DRG: 643 | End: 2020-09-19
Attending: INTERNAL MEDICINE
Payer: MEDICARE

## 2020-09-19 PROBLEM — D53.9 MACROCYTIC ANEMIA: Status: ACTIVE | Noted: 2020-09-19

## 2020-09-19 PROBLEM — D50.9 IRON DEFICIENCY ANEMIA: Status: ACTIVE | Noted: 2020-09-19

## 2020-09-19 LAB
ANION GAP SERPL CALC-SCNC: 13 MMOL/L (ref 7–16)
ANION GAP SERPL CALC-SCNC: 15 MMOL/L (ref 7–16)
ANION GAP SERPL CALC-SCNC: 15 MMOL/L (ref 7–16)
ANION GAP SERPL CALC-SCNC: 18 MMOL/L (ref 7–16)
BUN SERPL-MCNC: 20 MG/DL (ref 8–22)
BUN SERPL-MCNC: 22 MG/DL (ref 8–22)
BUN SERPL-MCNC: 22 MG/DL (ref 8–22)
BUN SERPL-MCNC: 23 MG/DL (ref 8–22)
CALCIUM SERPL-MCNC: 8.9 MG/DL (ref 8.5–10.5)
CALCIUM SERPL-MCNC: 9.1 MG/DL (ref 8.5–10.5)
CALCIUM SERPL-MCNC: 9.2 MG/DL (ref 8.5–10.5)
CALCIUM SERPL-MCNC: 9.3 MG/DL (ref 8.5–10.5)
CHLORIDE SERPL-SCNC: 79 MMOL/L (ref 96–112)
CHLORIDE SERPL-SCNC: 81 MMOL/L (ref 96–112)
CHLORIDE SERPL-SCNC: 82 MMOL/L (ref 96–112)
CHLORIDE SERPL-SCNC: 83 MMOL/L (ref 96–112)
CO2 SERPL-SCNC: 25 MMOL/L (ref 20–33)
CO2 SERPL-SCNC: 28 MMOL/L (ref 20–33)
CO2 SERPL-SCNC: 29 MMOL/L (ref 20–33)
CO2 SERPL-SCNC: 31 MMOL/L (ref 20–33)
CORTIS SERPL-MCNC: 17.1 UG/DL (ref 0–23)
CREAT SERPL-MCNC: 0.48 MG/DL (ref 0.5–1.4)
CREAT SERPL-MCNC: 0.5 MG/DL (ref 0.5–1.4)
CREAT SERPL-MCNC: 0.51 MG/DL (ref 0.5–1.4)
CREAT SERPL-MCNC: 0.58 MG/DL (ref 0.5–1.4)
GLUCOSE SERPL-MCNC: 105 MG/DL (ref 65–99)
GLUCOSE SERPL-MCNC: 106 MG/DL (ref 65–99)
GLUCOSE SERPL-MCNC: 115 MG/DL (ref 65–99)
GLUCOSE SERPL-MCNC: 118 MG/DL (ref 65–99)
MAGNESIUM SERPL-MCNC: 1.8 MG/DL (ref 1.5–2.5)
MAGNESIUM SERPL-MCNC: 1.9 MG/DL (ref 1.5–2.5)
MAGNESIUM SERPL-MCNC: 2 MG/DL (ref 1.5–2.5)
MAGNESIUM SERPL-MCNC: 2.3 MG/DL (ref 1.5–2.5)
POTASSIUM SERPL-SCNC: 3.3 MMOL/L (ref 3.6–5.5)
POTASSIUM SERPL-SCNC: 3.4 MMOL/L (ref 3.6–5.5)
POTASSIUM SERPL-SCNC: 3.5 MMOL/L (ref 3.6–5.5)
POTASSIUM SERPL-SCNC: 3.6 MMOL/L (ref 3.6–5.5)
SODIUM SERPL-SCNC: 123 MMOL/L (ref 135–145)
SODIUM SERPL-SCNC: 124 MMOL/L (ref 135–145)
SODIUM SERPL-SCNC: 125 MMOL/L (ref 135–145)
SODIUM SERPL-SCNC: 127 MMOL/L (ref 135–145)

## 2020-09-19 PROCEDURE — 83735 ASSAY OF MAGNESIUM: CPT | Mod: 91

## 2020-09-19 PROCEDURE — 700111 HCHG RX REV CODE 636 W/ 250 OVERRIDE (IP): Performed by: INTERNAL MEDICINE

## 2020-09-19 PROCEDURE — 770020 HCHG ROOM/CARE - TELE (206)

## 2020-09-19 PROCEDURE — 84105 ASSAY OF URINE PHOSPHORUS: CPT

## 2020-09-19 PROCEDURE — 80048 BASIC METABOLIC PNL TOTAL CA: CPT | Mod: 91

## 2020-09-19 PROCEDURE — 99233 SBSQ HOSP IP/OBS HIGH 50: CPT | Performed by: INTERNAL MEDICINE

## 2020-09-19 PROCEDURE — 700105 HCHG RX REV CODE 258: Performed by: NURSE PRACTITIONER

## 2020-09-19 PROCEDURE — 82533 TOTAL CORTISOL: CPT

## 2020-09-19 PROCEDURE — 36415 COLL VENOUS BLD VENIPUNCTURE: CPT

## 2020-09-19 RX ORDER — SODIUM CHLORIDE 1 G/1
2 TABLET ORAL
Status: DISCONTINUED | OUTPATIENT
Start: 2020-09-19 | End: 2020-09-19

## 2020-09-19 RX ORDER — SODIUM CHLORIDE 1 G/1
2 TABLET ORAL
Status: DISCONTINUED | OUTPATIENT
Start: 2020-09-20 | End: 2020-09-20

## 2020-09-19 RX ORDER — POTASSIUM CHLORIDE 7.45 MG/ML
10 INJECTION INTRAVENOUS
Status: COMPLETED | OUTPATIENT
Start: 2020-09-19 | End: 2020-09-19

## 2020-09-19 RX ADMIN — LABETALOL HYDROCHLORIDE 10 MG: 5 INJECTION, SOLUTION INTRAVENOUS at 17:45

## 2020-09-19 RX ADMIN — POTASSIUM CHLORIDE 10 MEQ: 7.46 INJECTION, SOLUTION INTRAVENOUS at 13:31

## 2020-09-19 RX ADMIN — LABETALOL HYDROCHLORIDE 10 MG: 5 INJECTION, SOLUTION INTRAVENOUS at 05:59

## 2020-09-19 RX ADMIN — SODIUM CHLORIDE: 9 INJECTION, SOLUTION INTRAVENOUS at 09:49

## 2020-09-19 RX ADMIN — POTASSIUM CHLORIDE 10 MEQ: 7.46 INJECTION, SOLUTION INTRAVENOUS at 14:42

## 2020-09-19 ASSESSMENT — ENCOUNTER SYMPTOMS
FEVER: 0
COUGH: 0
HEADACHES: 0
BLURRED VISION: 0
ABDOMINAL PAIN: 1

## 2020-09-19 ASSESSMENT — PAIN DESCRIPTION - PAIN TYPE: TYPE: ACUTE PAIN

## 2020-09-19 NOTE — PROGRESS NOTES
Attempted to place coretrack x2. Pt. Is very agitated and bares down against coretrack when attempting to place. Placement unsuccessful.

## 2020-09-19 NOTE — CONSULTS
"[facility] -  PROGRESS NOTE               Author: Mesfin Alanis M.D. Date & Time: 9/19/2020  8:34 AM       REASON FOR CONSULTATION:   - Hyponatremia    CHIEF COMPLAINT:   -  \"Low Sodium  \"    HISTORY OF PRESENT ILLNESS:    Per report as patient is not able to give history, patient is a 75 y.o. male who presented 9/12/2020 with altered mental status.  Apparently the patient was transferred for evaluation of altered mental status a few days ago progressively got worse was associated with some right-sided weakness that started in the morning.  Also reported to have a fall a week prior to presentation.  The patient was transferred as a code stroke he was evaluated by neurology and felt that his symptoms are likely related to hypertensive and possibly metabolic encephalopathy.  Etiology of encephalopathy was unclear thought maybe metabolic ?hyponatremia as cause.  EEG with no seizures, no evidence of infection.  Imaging with no evidence of acute infarct.    Nephrology consulted for hyponatremia.      Patient seen at bedside was AOx3 per nurse but now sleeping from Ativan.    Daily Nephrology Summary  9/18 - Consult done  9/19 - Patient was started on NS at 100cc/hr last night.  Sodium fluctuating between 120 and 124.  Complains of joint pain and abdominal pain.      REVIEW OF SYSTEMS:    Review of Systems   Constitutional: Negative for fever.   HENT: Negative for hearing loss.    Eyes: Negative for blurred vision.   Respiratory: Negative for cough.    Cardiovascular: Negative for chest pain.   Gastrointestinal: Positive for abdominal pain.   Genitourinary: Negative for dysuria.   Musculoskeletal: Positive for joint pain.   Skin: Negative for rash.   Neurological: Negative for headaches.         PAST MEDICAL HISTORY:   pAfib  Dyslipidemia  Back Pain      PAST SURGICAL HISTORY:   No past surgical history on file.      FAMILY HISTORY:   - Reviewed and non contributory to current illness    SOCIAL " "HISTORY:   Social History     Socioeconomic History   • Marital status:      Spouse name: Not on file   • Number of children: Not on file   • Years of education: Not on file   • Highest education level: Not on file   Occupational History   • Not on file   Social Needs   • Financial resource strain: Not on file   • Food insecurity     Worry: Not on file     Inability: Not on file   • Transportation needs     Medical: Not on file     Non-medical: Not on file   Tobacco Use   • Smoking status: Former Smoker   • Smokeless tobacco: Never Used   Substance and Sexual Activity   • Alcohol use: Not on file   • Drug use: Not on file   • Sexual activity: Not on file   Lifestyle   • Physical activity     Days per week: Not on file     Minutes per session: Not on file   • Stress: Not on file   Relationships   • Social connections     Talks on phone: Not on file     Gets together: Not on file     Attends Latter day service: Not on file     Active member of club or organization: Not on file     Attends meetings of clubs or organizations: Not on file     Relationship status: Not on file   • Intimate partner violence     Fear of current or ex partner: Not on file     Emotionally abused: Not on file     Physically abused: Not on file     Forced sexual activity: Not on file   Other Topics Concern   • Not on file   Social History Narrative   • Not on file       HOME MEDICATIONS:   - Reviewed and documented in chart    LABORATORY STUDIES:   - Reviewed and documented in chart    ALLERGIES:  Patient has no known allergies.    VS:  /80 Comment: RN notified   Pulse 80   Temp 36.9 °C (98.4 °F) (Temporal)   Resp 16   Ht 1.702 m (5' 7.01\")   Wt 77.4 kg (170 lb 10.2 oz)   SpO2 92%   BMI 26.72 kg/m²   Physical Exam  Constitutional:       Comments: Asleep, difficult to arouse   HENT:      Head: Normocephalic and atraumatic.      Nose: Nose normal.      Mouth/Throat:      Mouth: Mucous membranes are moist.   Cardiovascular:      " Rate and Rhythm: Normal rate and regular rhythm.      Pulses: Normal pulses.      Heart sounds: No murmur.   Pulmonary:      Effort: Pulmonary effort is normal.   Abdominal:      General: Abdomen is flat. There is no distension.      Palpations: Abdomen is soft.      Tenderness: There is no abdominal tenderness. There is no guarding.   Skin:     General: Skin is warm and dry.   Neurological:      Comments: Asleep         LABS:  Recent Results (from the past 24 hour(s))   Basic Metabolic Panel    Collection Time: 09/18/20 12:16 PM   Result Value Ref Range    Sodium 124 (L) 135 - 145 mmol/L    Potassium 3.3 (L) 3.6 - 5.5 mmol/L    Chloride 78 (L) 96 - 112 mmol/L    Co2 31 20 - 33 mmol/L    Glucose 113 (H) 65 - 99 mg/dL    Bun 16 8 - 22 mg/dL    Creatinine 0.49 (L) 0.50 - 1.40 mg/dL    Calcium 9.3 8.5 - 10.5 mg/dL    Anion Gap 15.0 7.0 - 16.0   MAGNESIUM    Collection Time: 09/18/20 12:16 PM   Result Value Ref Range    Magnesium 2.3 1.5 - 2.5 mg/dL   ESTIMATED GFR    Collection Time: 09/18/20 12:16 PM   Result Value Ref Range    GFR If African American >60 >60 mL/min/1.73 m 2    GFR If Non African American >60 >60 mL/min/1.73 m 2   URINE SODIUM RANDOM    Collection Time: 09/18/20  1:41 PM   Result Value Ref Range    Sodium, Urine -per volume 111 mmol/L   URINE POTASSIUM RANDOM    Collection Time: 09/18/20  1:41 PM   Result Value Ref Range    Potassium 31.0 mmol/L   PROTEIN/CREAT RATIO URINE    Collection Time: 09/18/20  1:41 PM   Result Value Ref Range    Total Protein, Urine 10.0 0.0 - 15.0 mg/dL    Creatinine, Random Urine 20.84 mg/dL    Protein Creatinine Ratio 480 (H) 15 - 68 mg/g   OSMOLALITY URINE    Collection Time: 09/18/20  1:41 PM   Result Value Ref Range    Osmolality Urine 350 300 - 900 mOsm/kg H2O   Free Light Chains, Qnt, Ur    Collection Time: 09/18/20  1:41 PM   Result Value Ref Range    Collection Length Random Hrs    Total Volume Random mL   PHOSPHORUS 24 HR URINE    Collection Time: 09/18/20  1:41 PM    Result Value Ref Range    Collection Length Random Hrs    Total Volume Random mL   URINE CREATININE RANDOM    Collection Time: 09/18/20  1:41 PM   Result Value Ref Range    Creatinine, Random Urine 21.11 mg/dL   Basic Metabolic Panel    Collection Time: 09/18/20  5:42 PM   Result Value Ref Range    Sodium 120 (L) 135 - 145 mmol/L    Potassium 3.9 3.6 - 5.5 mmol/L    Chloride 76 (L) 96 - 112 mmol/L    Co2 29 20 - 33 mmol/L    Glucose 107 (H) 65 - 99 mg/dL    Bun 19 8 - 22 mg/dL    Creatinine 0.62 0.50 - 1.40 mg/dL    Calcium 9.7 8.5 - 10.5 mg/dL    Anion Gap 15.0 7.0 - 16.0   MAGNESIUM    Collection Time: 09/18/20  5:42 PM   Result Value Ref Range    Magnesium 2.7 (H) 1.5 - 2.5 mg/dL   FERRITIN    Collection Time: 09/18/20  5:42 PM   Result Value Ref Range    Ferritin 554.0 (H) 22.0 - 322.0 ng/mL   IRON/TOTAL IRON BIND    Collection Time: 09/18/20  5:42 PM   Result Value Ref Range    Iron 22 (L) 50 - 180 ug/dL    Total Iron Binding 244 (L) 250 - 450 ug/dL    Unsat Iron Binding 222 110 - 370 ug/dL    % Saturation 9 (L) 15 - 55 %   ESTIMATED GFR    Collection Time: 09/18/20  5:42 PM   Result Value Ref Range    GFR If African American >60 >60 mL/min/1.73 m 2    GFR If Non African American >60 >60 mL/min/1.73 m 2   Basic Metabolic Panel    Collection Time: 09/19/20 12:39 AM   Result Value Ref Range    Sodium 123 (L) 135 - 145 mmol/L    Potassium 3.6 3.6 - 5.5 mmol/L    Chloride 79 (L) 96 - 112 mmol/L    Co2 29 20 - 33 mmol/L    Glucose 118 (H) 65 - 99 mg/dL    Bun 22 8 - 22 mg/dL    Creatinine 0.58 0.50 - 1.40 mg/dL    Calcium 9.3 8.5 - 10.5 mg/dL    Anion Gap 15.0 7.0 - 16.0   MAGNESIUM    Collection Time: 09/19/20 12:39 AM   Result Value Ref Range    Magnesium 2.3 1.5 - 2.5 mg/dL   ESTIMATED GFR    Collection Time: 09/19/20 12:39 AM   Result Value Ref Range    GFR If African American >60 >60 mL/min/1.73 m 2    GFR If Non African American >60 >60 mL/min/1.73 m 2   Basic Metabolic Panel    Collection Time: 09/19/20   7:02 AM   Result Value Ref Range    Sodium 124 (L) 135 - 145 mmol/L    Potassium 3.3 (L) 3.6 - 5.5 mmol/L    Chloride 81 (L) 96 - 112 mmol/L    Co2 28 20 - 33 mmol/L    Glucose 115 (H) 65 - 99 mg/dL    Bun 22 8 - 22 mg/dL    Creatinine 0.48 (L) 0.50 - 1.40 mg/dL    Calcium 9.1 8.5 - 10.5 mg/dL    Anion Gap 15.0 7.0 - 16.0   MAGNESIUM    Collection Time: 09/19/20  7:02 AM   Result Value Ref Range    Magnesium 2.0 1.5 - 2.5 mg/dL   ESTIMATED GFR    Collection Time: 09/19/20  7:02 AM   Result Value Ref Range    GFR If African American >60 >60 mL/min/1.73 m 2    GFR If Non African American >60 >60 mL/min/1.73 m 2       (click the triangle to expand results)    IMAGING:  DX-ABDOMEN FOR TUBE PLACEMENT   Final Result      Enteric tube projects over the distal esophagus. Recommend advancement.      HA-DZDOMNA-2 VIEW   Final Result      Feeding tube tip projects over the distal esophagus, near the level of the gastroesophageal junction      DX-CHEST-PORTABLE (1 VIEW)   Final Result         Decreased mild interstitial pulmonary edema.      DX-ABDOMEN FOR TUBE PLACEMENT   Final Result         Feeding tube with tip projecting over the expected area of the proximal stomach.      MR-BRAIN-W/O   Final Result      1.  No evidence of acute territorial infarct, intracranial hemorrhage or mass lesion.   2.  Moderate diffuse cerebral substance loss.   3.  Moderate microangiopathic ischemic change versus demyelination or gliosis.   4.  Chronic ischemic pontine gliosis.   5.  Small focus of chronic hemosiderin deposition within the left inferior frontal lobe likely related to remote hemorrhagic infarct or trauma.      DX-THORACOLUMBAR SPINE-2 VIEWS   Final Result      1.  No acute fracture or dislocation identified.      2.  Degenerative changes are noted.      DX-CHEST-PORTABLE (1 VIEW)   Final Result      1.  Cardiomegaly.      2.  Mild bilateral patchy pulmonary infiltrates.      CT-CTA NECK WITH & W/O-POST PROCESSING   Final Result       1.  Atherosclerotic plaque involving the common and internal carotid arteries bilaterally most prominently seen at the bifurcations. This results in less than 50% diameter narrowing bilaterally.      2.  Atherosclerotic plaque involving the vertebral arteries bilaterally. No evidence of occlusion.      CT-CEREBRAL PERFUSION ANALYSIS   Final Result      1.  Cerebral blood flow less than 30% likely representing completed infarct = 0 mL.      2.  T Max more than 6 seconds likely representing combination of completed infarct and ischemia = 267 mL.      3.  Mismatched volume likely representing ischemic brain/penumbra = 267 mL      4.  Please note that the cerebral perfusion was performed on the limited brain tissue around the basal ganglia region. Infarct/ischemia outside the CT perfusion sections can be missed in this study.      CT-CTA HEAD WITH & W/O-POST PROCESS   Final Result      No evidence of intracranial vascular occlusion or aneurysm.      CT-HEAD W/O   Final Result         NO ACUTE ABNORMALITIES ARE NOTED ON CT SCAN OF THE HEAD.      Findings are consistent with atrophy.  Decreased attenuation in the periventricular white matter likely indicates microvascular ischemic disease.          IMPRESSION:  - Hyponatremia    * Urine sodium elevated and initially with elevated urine osm as well 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, unclear cause but he is complaining of some abdominal pain and joint pain    * Patient is euvolemic on exam with low serum osm    * However, other things to consider include cerebral salt wasting but patient generally would be hypovolemic on exam, with more UOP    * Sodium stable at 120 overnight then 124 this morning.  Unchanged from yesterday.  NS started last night    * ?Fanconi given elevated urine glucose with no history of diabetes  - Proteinuria    * Given slightly elevated urine protein and anemia will send for SPEP/ UPEP MATTIE    * Small blood in urine likely from enrique, don't suspect GN    * Can  repeat UA when enrique is removed  - Acute Encephalopathy    * Unclear cause thought to be from hyponatremia    * Continue management per primary team and neurology  - Anemia    * Anemia of chronic disease    * Epo not indicated    * Ferritin 550  - Hypokalemia    * Possibly from diuretics and hypomagnesemia    * Hold diuretics for now continue with repletion  - Hypomagnesemia    * Resolved    * Replete PRN  - Paroxysmal Atrial Fibrillation    * management per primary team  - HTN    * More recently blood pressure is in a better range    * Target goal per neurology  - Dyslipidemia  - Back Pain    PLAN:  - Free water restrict to 1L daily if possible  - Would not recommend NS as labs more consistent with SIADH at this time, consider stopping if sodium not improving  - Continue salt tablets 2g TID, ordered this morning  - Accurate I/Os while on tube feeds  - Avoid nephrotoxic agents  - Follow up SPEP/UPEP, light chains  - Replete potassium PRN      All prior notes form other doctors and RN staff were reviewed from admission to current day to help me make my clinical decisions    Thank you for the consultation!

## 2020-09-19 NOTE — PROGRESS NOTES
An RN tried to place core trac, two RNs assisting.  Pt not cooperating, in bilat wrist restraints. No PRN meds to relax pt, will MD on call.

## 2020-09-19 NOTE — PROGRESS NOTES
Cortrak Placement    Tube Team verified patient name and medical record number prior to tube placement.  Cortrak tube (43 inches, 10 Montenegrin) placed at 50 cm in left nare.  Per Cortrak picture, tube appears to be in the stomach.  Nursing Instructions: Awaiting KUB to confirm placement before use for medications or feeding. Once placement confirmed, flush tube with 30 ml of water, and then remove and save stylet, in patient medication drawer.

## 2020-09-19 NOTE — THERAPY
Physical Therapy   Daily Treatment     Patient Name: Jordan Alexander  Age:  75 y.o., Sex:  male  Medical Record #: [id number]  Today's Date: 9/18/2020     Precautions: Fall Risk, Swallow Precautions ( See Comments)    Assessment    Pt demonstrating improved standing balance and B wt shifting today, able to take steps with HHA.     Plan    Continue current treatment plan.    DC Equipment Recommendations: Unable to determine at this time  Discharge Recommendations: Recommend post-acute placement for additional physical therapy services prior to discharge home      Subjective    Pt agrees to PT.     Objective       09/18/20 1359   Cognition    Speech/ Communication Delayed Responses;Word Finding Impairment   Level of Consciousness Alert   Ability To Follow Commands 2 Step   Safety Awareness Impaired   Comments Pt cooperative and appropriate.   Passive ROM Lower Body   Passive ROM Lower Body WDL   Active ROM Lower Body    Gross Active ROM Gross Active Range of Motion Impaired,  but Appears Adequate for Functional Mobility.   Strength Lower Body   Rt Hip Flexion Strength 3 (F)   Rt Knee Flexion Strength 3+ (F+)   Rt Knee Extension Strength 3+ (F+)   Rt Ankle Dorsiflexion Strength 3+ (F+)   Rt Ankle Plantar Flexion Strength 3+ (F+)   Lt Hip Flexion Strength 3+ (F+)   Lt Knee Flexion Strength 3+ (F+)   Lt Knee Extension Strength 3+ (F+)   Lt Ankle Dorsiflexion Strength 4- (G-)   Lt Ankle Plantar Flexion Strength 4- (G-)   Neuro-Muscular Treatments   Neuro-Muscular Treatments Anterior weight shift;Facilitation;Verbal Cuing;Weight Shift Right;Weight Shift Left;Sequencing;Tactile Cuing   Comments standing at EOB with B HHA   Balance   Sitting Balance (Static) Fair +   Sitting Balance (Dynamic) Fair   Standing Balance (Static) Fair   Standing Balance (Dynamic) Fair -   Weight Shift Sitting Fair   Weight Shift Standing Fair   Skilled Intervention Verbal Cuing;Tactile Cuing;Sequencing;Facilitation;Compensatory Strategies   Comments at  EOB with HHA   Gait Analysis   Gait Level Of Assist Minimal Assist   Assistive Device Hand Held Assist   Distance (Feet) 6   # of Times Distance was Traveled 1   Skilled Intervention Verbal Cuing;Tactile Cuing;Sequencing;Facilitation   Bed Mobility    Supine to Sit Minimal Assist   Scooting Supervised   Skilled Intervention Verbal Cuing;Sequencing;Compensatory Strategies   Functional Mobility   Sit to Stand Minimal Assist   Bed, Chair, Wheelchair Transfer Minimal Assist   Transfer Method Stand Step   Mobility bed>recliner   Skilled Intervention Verbal Cuing;Tactile Cuing;Sequencing;Compensatory Strategies   Activity Tolerance   Sitting Edge of Bed 10 min   Standing 10 min total   Short Term Goals    Short Term Goal # 1 Pt will be SPV for supine<>sit at EOB in 6 txs to improve functional mobility.   Goal Outcome # 1 goal not met   Short Term Goal # 2 Pt will be SPV for EOB sitting x 10 min in 6txs to improve upright tolerance.   Goal Outcome # 2 Goal met   Short Term Goal # 3 Pt will be Rodolfo for transfers with LRAD in 6 txs to improve fucntional mobility.   Goal Outcome # 3 Goal met, new goal added   Short Term Goal # 3 B Pt will be SPV for transfers with LRAD in 6 txs to improve functional mobility.   Goal Outcome # 3 B Goal not met   Short Term Goal # 4 Pt will be Rodolfo for gait > 50' with LRAD in 6 txs to improve functional mobility.   Goal Outcome # 4 Goal not met   Education Group   Role of Physical Therapist Patient Response Patient;Acceptance;Demonstration;Explanation;Action Demonstration;Verbal Demonstration;Reinforcement Needed   Anticipated Discharge Equipment and Recommendations   DC Equipment Recommendations Unable to determine at this time   Discharge Recommendations Recommend post-acute placement for additional physical therapy services prior to discharge home

## 2020-09-19 NOTE — PROGRESS NOTES
Cortrak Placement    Tube Team verified patient name and medical record number prior to tube placement.  Cortrak tube (43 inches, 10 [place]) placed at 50 cm in left nare.  Per Cortrak picture, tube appears to be in the stomach  Nursing Instructions: Awaiting KUB to confirm placement before use for medications or feeding. Once placement confirmed, flush tube with 30 ml of water, and then remove and save stylet, in patient medication drawer.     1800: Chest X ray states tube is in esophagus and recommends tube advancement

## 2020-09-19 NOTE — PROGRESS NOTES
Pt is lethargic answers to voice, oriented x 0.  Asked pt his birthday he said in [birth year].  Does not follow commands nor cooperates.  BP elevated at 169/72, will PRN labetalol per orders. Denies pain.  T/F is turned off, as pt partially removed core trac, and no staff has been able to replace/insert new core trac.  Two assist q 2 hour turns.  Bilat wrist restraints and bed alarm in place.  Telemonitor in place.  Condom cath in place.  Pt updated on POC, needs met and questions answered.  Does not call appropriately.  Call light within reach and working properly.

## 2020-09-19 NOTE — CARE PLAN
Problem: Venous Thromboembolism (VTW)/Deep Vein Thrombosis (DVT) Prevention:  Goal: Patient will participate in Venous Thrombosis (VTE)/Deep Vein Thrombosis (DVT)Prevention Measures  Outcome: PROGRESSING AS EXPECTED     Problem: Skin Integrity  Goal: Risk for impaired skin integrity will decrease  Outcome: PROGRESSING AS EXPECTED     Problem: Safety - Medical Restraint  Goal: Remains free of injury from restraints (Restraint for Interference with Medical Device)  Description: INTERVENTIONS:  1. Determine that other, less restrictive measures have been tried or would not be effective before applying the restraint  2. Evaluate the patient's condition at the time of restraint application  3. Inform patient/family regarding the reason for restraint  4. Q2H: Monitor safety, psychosocial status, comfort, nutrition and hydration  Outcome: PROGRESSING AS EXPECTED     Problem: Mobility  Goal: Risk for activity intolerance will decrease  Outcome: PROGRESSING AS EXPECTED     Problem: Knowledge Deficit  Goal: Knowledge of disease process/condition, treatment plan, diagnostic tests, and medications will improve  Outcome: PROGRESSING SLOWER THAN EXPECTED     Problem: Safety - Medical Restraint  Goal: Free from restraint(s) (Restraint for Interference with Medical Device)  Description: INTERVENTIONS:  1. ONCE/SHIFT or MINIMUM Q12H: Assess and document the continuing need for restraints  2. Q24H: Continued use of restraint requires LIP to perform face to face examination and written order  3. Identify and implement measures to help patient regain control  Outcome: PROGRESSING SLOWER THAN EXPECTED

## 2020-09-19 NOTE — CARE PLAN
Problem: Safety  Goal: Will remain free from injury  Outcome: PROGRESSING AS EXPECTED  Note: Fall precaution in place, mobility assessed and signs placed outside pt door.  Bed alarm turned on and in place. Pt in bilat restraints.  Bed in the lowest locked position. Treaded socks on pt. Call light within reach.

## 2020-09-19 NOTE — CONSULTS
Referring Physician: Dr. Emily John    Referral Reason: Alteration of mental status and hyponatremia    HPI:  Mr. Jordan Alexander is a 75 y.o. male who was admitted on 9/12/2020, initially for alteration of mental status and right-sided weakness.  He underwent CT, CTA of the head and neck which were all unremarkable.  Brain MRI was obtained which did not reveal evidence of a stroke or acute abnormalities.  Patient remained altered and today he was noted to have sodium of 120 for which I was asked to see him for alteration of mental status.  At the time of my visit he was given some Ativan and he was hard to arouse but apparently he is a slightly agitated at baseline but moves all 4 extremity.  He also underwent an EEG which did not reveal evidence of seizure activity.  Nephrology is also consulted to address his hyponatremia.    ROS:   Unable to obtain.    Past Medical History: No past medical history on file.    Past Surgical History: No past surgical history on file.    Social History:   Social History     Socioeconomic History   • Marital status:      Spouse name: Not on file   • Number of children: Not on file   • Years of education: Not on file   • Highest education level: Not on file   Occupational History   • Not on file   Social Needs   • Financial resource strain: Not on file   • Food insecurity     Worry: Not on file     Inability: Not on file   • Transportation needs     Medical: Not on file     Non-medical: Not on file   Tobacco Use   • Smoking status: Former Smoker   • Smokeless tobacco: Never Used   Substance and Sexual Activity   • Alcohol use: Not on file   • Drug use: Not on file   • Sexual activity: Not on file   Lifestyle   • Physical activity     Days per week: Not on file     Minutes per session: Not on file   • Stress: Not on file   Relationships   • Social connections     Talks on phone: Not on file     Gets together: Not on file     Attends Holiness service: Not on file     Active  member of club or organization: Not on file     Attends meetings of clubs or organizations: Not on file     Relationship status: Not on file   • Intimate partner violence     Fear of current or ex partner: Not on file     Emotionally abused: Not on file     Physically abused: Not on file     Forced sexual activity: Not on file   Other Topics Concern   • Not on file   Social History Narrative   • Not on file       Family Hx: No family history on file.    Current Medications:   Current Facility-Administered Medications   Medication Dose Route Frequency Provider Last Rate Last Dose   • potassium bicarbonate (KLYTE) effervescent tablet 50 mEq  50 mEq Enteral Tube Once Emily John M.D.   Stopped at 09/18/20 0715   • Pharmacy Consult: Enteral tube insertion - review meds/change route/product selection  1 Each Other PHARMACY TO DOSE Ann-Marie Alberto M.D.       • acetaminophen (TYLENOL) tablet 500 mg  500 mg Enteral Tube 4X/DAY Ann-Marie Alberto M.D.   Stopped at 09/18/20 0900   • apixaban (ELIQUIS) tablet 5 mg  5 mg Enteral Tube BID Ann-Marie Alberto M.D.   Stopped at 09/18/20 0600   • metoprolol (LOPRESSOR) tablet 12.5 mg  12.5 mg Enteral Tube TWICE DAILY Ann-Marie Alberto M.D.   Stopped at 09/18/20 0600   • montelukast (SINGULAIR) tablet 10 mg  10 mg Enteral Tube DAILY Ann-Marie Alberto M.D.   Stopped at 09/18/20 0600   • senna-docusate (PERICOLACE or SENOKOT S) 8.6-50 MG per tablet 2 Tab  2 Tab Enteral Tube BID Ann-Marie Alberto M.D.   Stopped at 09/17/20 1800    And   • polyethylene glycol/lytes (MIRALAX) PACKET 1 Packet  1 Packet Enteral Tube QDAY PRN Ann-Marie Alberto M.D.        And   • magnesium hydroxide (MILK OF MAGNESIA) suspension 30 mL  30 mL Enteral Tube QDAY PRN Ann-Marie Alberto M.D.        And   • bisacodyl (DULCOLAX) suppository 10 mg  10 mg Rectal QDAY PRN Ann-Marie Alberto M.D.       • simvastatin (ZOCOR) tablet 20 mg  20 mg Enteral Tube Q EVENING Ann-Marie Alberto M.D.   20 mg at 09/17/20 3037    • sotalol (BETAPACE) tablet 120 mg  120 mg Enteral Tube Q EVENING Ann-Marie Alberto M.D.   120 mg at 09/17/20 1624   • sotalol (BETAPACE) tablet 80 mg  80 mg Enteral Tube QAM Ann-Marie Alberto M.D.   Stopped at 09/18/20 0600   • labetalol (NORMODYNE/TRANDATE) injection 10 mg  10 mg Intravenous Q4HRS PRN Angelo Eden M.D.   10 mg at 09/18/20 0638   • budesonide-formoterol (SYMBICORT) 160-4.5 MCG/ACT inhaler 2 Puff  2 Puff Inhalation BID PRN Angelo Eden M.D.       • lidocaine (LIDODERM) 5 % 1 Patch  1 Patch Transdermal QDAY PRN Angelo Eden M.D.   1 Patch at 09/12/20 1759       Allergies: No Known Allergies    Physical Exam:   Vitals:    09/18/20 0636 09/18/20 0750 09/18/20 1120 09/18/20 1610   BP: (!) 166/105 (!) 171/66 121/70 143/64   Pulse:  65 66 66   Resp:  16 16    Temp:  36.7 °C (98 °F) 36.8 °C (98.2 °F) 36.5 °C (97.7 °F)   TempSrc:  Temporal Temporal Temporal   SpO2:  95% 99% 96%   Weight:       Height:           Physical Exam   GENERAL: He is laying on the chair sleeping status post administration of Ativan.  Head: Normocephalic and atraumatic.   Eyes: Pupils are equal, round, and reactive to light.   Cardiovascular: Normal rate and regular rhythm.    Pulmonary/Chest: Breath sounds normal.   Abdominal: Soft. Bowel sounds are normal. He exhibits no distension. There is no tenderness.   Skin: Skin is warm and dry. No rash noted. No erythema.  Neuro Exam  His neurological examination is limited as patient received Ativan prior to my assessment and he is deeply asleep and hard to arouse.  According to bedside nurse patient is a slightly agitated and tries to pull her IV out.  He was moving all 4 extremity prior to receiving Ativan.    Labs:  Recent Labs     09/17/20  1742   WBC 9.5   RBC 3.45*   HEMOGLOBIN 12.3*   HEMATOCRIT 34.2*   .0*   MCH 35.9*   MCHC 35.9*   RDW 47.1   PLATELETCT 460*   MPV 10.4     Recent Labs     09/18/20  0058 09/18/20  0703 09/18/20  1216   SODIUM 123*  121* 124*   POTASSIUM 3.5* 3.2* 3.3*   CHLORIDE 81* 80* 78*   CO2 29 31 31   GLUCOSE 130* 127* 113*   BUN 15 14 16   CREATININE 0.39* 0.38* 0.49*   CALCIUM 9.1 9.1 9.3                     Recent Labs     09/18/20  0058 09/18/20  0703 09/18/20  1216   SODIUM 123* 121* 124*   POTASSIUM 3.5* 3.2* 3.3*   CHLORIDE 81* 80* 78*   CO2 29 31 31   GLUCOSE 130* 127* 113*   BUN 15 14 16     Recent Labs     09/16/20  1256  09/18/20  0058 09/18/20  0703 09/18/20  1216   SODIUM 123*   < > 123* 121* 124*   POTASSIUM 2.9*   < > 3.5* 3.2* 3.3*   CHLORIDE 83*   < > 81* 80* 78*   CO2 29   < > 29 31 31   BUN 11   < > 15 14 16   CREATININE 0.31*   < > 0.39* 0.38* 0.49*   MAGNESIUM 1.3*   < > 1.6 1.4* 2.3   PHOSPHORUS 2.8  --   --   --   --    CALCIUM 8.9   < > 9.1 9.1 9.3    < > = values in this interval not displayed.         No results found for this or any previous visit.      Imaging reviewed:    [id number] VIEW   Final Result      Feeding tube tip projects over the distal esophagus, near the level of the gastroesophageal junction      DX-CHEST-PORTABLE (1 VIEW)   Final Result         Decreased mild interstitial pulmonary edema.      DX-ABDOMEN FOR TUBE PLACEMENT   Final Result         Feeding tube with tip projecting over the expected area of the proximal stomach.      MR-BRAIN-W/O   Final Result      1.  No evidence of acute territorial infarct, intracranial hemorrhage or mass lesion.   2.  Moderate diffuse cerebral substance loss.   3.  Moderate microangiopathic ischemic change versus demyelination or gliosis.   4.  Chronic ischemic pontine gliosis.   5.  Small focus of chronic hemosiderin deposition within the left inferior frontal lobe likely related to remote hemorrhagic infarct or trauma.      DX-THORACOLUMBAR SPINE-2 VIEWS   Final Result      1.  No acute fracture or dislocation identified.      2.  Degenerative changes are noted.      DX-CHEST-PORTABLE (1 VIEW)   Final Result      1.  Cardiomegaly.      2.  Mild bilateral  patchy pulmonary infiltrates.      CT-CTA NECK WITH & W/O-POST PROCESSING   Final Result      1.  Atherosclerotic plaque involving the common and internal carotid arteries bilaterally most prominently seen at the bifurcations. This results in less than 50% diameter narrowing bilaterally.      2.  Atherosclerotic plaque involving the vertebral arteries bilaterally. No evidence of occlusion.      CT-CEREBRAL PERFUSION ANALYSIS   Final Result      1.  Cerebral blood flow less than 30% likely representing completed infarct = 0 mL.      2.  T Max more than 6 seconds likely representing combination of completed infarct and ischemia = 267 mL.      3.  Mismatched volume likely representing ischemic brain/penumbra = 267 mL      4.  Please note that the cerebral perfusion was performed on the limited brain tissue around the basal ganglia region. Infarct/ischemia outside the CT perfusion sections can be missed in this study.      CT-CTA HEAD WITH & W/O-POST PROCESS   Final Result      No evidence of intracranial vascular occlusion or aneurysm.      CT-HEAD W/O   Final Result         NO ACUTE ABNORMALITIES ARE NOTED ON CT SCAN OF THE HEAD.      Findings are consistent with atrophy.  Decreased attenuation in the periventricular white matter likely indicates microvascular ischemic disease.      DX-ABDOMEN FOR TUBE PLACEMENT    (Results Pending)          Assessment/Plan:  75 y.o. male with alteration of mental status, etiology likely metabolic encephalopathy.  He has profound hyponatremia in low 120s.  Likely SIADH.  Nephrology has been consulted and patient was started on salt tablets and restriction of free water.  Would suggest gentle correction of hyponatremia to avoid central pontine myelinolysis.  Correct sodium by about 10% within first 24 hours.  Sodium check every 6 hours to monitor and avoid rapid correction.    I have no further recommendation, please call me if there is any question.  Discussed with Dr. John.

## 2020-09-19 NOTE — PROGRESS NOTES
With patient’s permission (if able), completed daily phone call to designated support person, John  Discussed patient condition and plan of care. All questions answered.

## 2020-09-19 NOTE — CARE PLAN
Problem: Psychosocial Needs:  Goal: Level of anxiety will decrease  Outcome: PROGRESSING AS EXPECTED     Problem: Communication  Goal: The ability to communicate needs accurately and effectively will improve  Outcome: PROGRESSING AS EXPECTED

## 2020-09-19 NOTE — PROGRESS NOTES
Informed Darline ALVAREZ, unable to place core track d/t pt not cooperating, restless. Daystami gave pt Ativan to help with core trac placement and noted after Ativan is when he become very confused and lethargic.  Asked Darline ALVAREZ if she wanted to try another medication to help relax pt, Darline ALVAREZ said no, and to hold off on replacing core trac, pass on to dayshift.

## 2020-09-19 NOTE — PROGRESS NOTES
Monitor Summary: SR 65-77, ND 0.24, QRS 0.08, QT 0.40, with occassional to rare PVCs per strip from monitor room.

## 2020-09-20 PROBLEM — R47.02 DYSPHASIA: Status: ACTIVE | Noted: 2020-09-20

## 2020-09-20 LAB
ANION GAP SERPL CALC-SCNC: 13 MMOL/L (ref 7–16)
ANION GAP SERPL CALC-SCNC: 16 MMOL/L (ref 7–16)
ANION GAP SERPL CALC-SCNC: 17 MMOL/L (ref 7–16)
ANION GAP SERPL CALC-SCNC: 18 MMOL/L (ref 7–16)
BUN SERPL-MCNC: 22 MG/DL (ref 8–22)
BUN SERPL-MCNC: 23 MG/DL (ref 8–22)
BUN SERPL-MCNC: 25 MG/DL (ref 8–22)
BUN SERPL-MCNC: 26 MG/DL (ref 8–22)
CALCIUM SERPL-MCNC: 8.9 MG/DL (ref 8.5–10.5)
CALCIUM SERPL-MCNC: 8.9 MG/DL (ref 8.5–10.5)
CALCIUM SERPL-MCNC: 9 MG/DL (ref 8.5–10.5)
CALCIUM SERPL-MCNC: 9.1 MG/DL (ref 8.5–10.5)
CHLORIDE SERPL-SCNC: 82 MMOL/L (ref 96–112)
CHLORIDE SERPL-SCNC: 83 MMOL/L (ref 96–112)
CHLORIDE SERPL-SCNC: 86 MMOL/L (ref 96–112)
CHLORIDE SERPL-SCNC: 86 MMOL/L (ref 96–112)
CO2 SERPL-SCNC: 24 MMOL/L (ref 20–33)
CO2 SERPL-SCNC: 25 MMOL/L (ref 20–33)
CO2 SERPL-SCNC: 25 MMOL/L (ref 20–33)
CO2 SERPL-SCNC: 28 MMOL/L (ref 20–33)
CREAT SERPL-MCNC: 0.49 MG/DL (ref 0.5–1.4)
CREAT SERPL-MCNC: 0.58 MG/DL (ref 0.5–1.4)
CREAT SERPL-MCNC: 0.59 MG/DL (ref 0.5–1.4)
CREAT SERPL-MCNC: 0.78 MG/DL (ref 0.5–1.4)
GLUCOSE SERPL-MCNC: 118 MG/DL (ref 65–99)
GLUCOSE SERPL-MCNC: 151 MG/DL (ref 65–99)
GLUCOSE SERPL-MCNC: 97 MG/DL (ref 65–99)
GLUCOSE SERPL-MCNC: 99 MG/DL (ref 65–99)
MAGNESIUM SERPL-MCNC: 1.7 MG/DL (ref 1.5–2.5)
MAGNESIUM SERPL-MCNC: 1.9 MG/DL (ref 1.5–2.5)
POTASSIUM SERPL-SCNC: 3.2 MMOL/L (ref 3.6–5.5)
POTASSIUM SERPL-SCNC: 3.2 MMOL/L (ref 3.6–5.5)
POTASSIUM SERPL-SCNC: 3.6 MMOL/L (ref 3.6–5.5)
POTASSIUM SERPL-SCNC: 3.8 MMOL/L (ref 3.6–5.5)
SODIUM SERPL-SCNC: 124 MMOL/L (ref 135–145)
SODIUM SERPL-SCNC: 125 MMOL/L (ref 135–145)
SODIUM SERPL-SCNC: 127 MMOL/L (ref 135–145)
SODIUM SERPL-SCNC: 127 MMOL/L (ref 135–145)

## 2020-09-20 PROCEDURE — 99233 SBSQ HOSP IP/OBS HIGH 50: CPT | Performed by: INTERNAL MEDICINE

## 2020-09-20 PROCEDURE — 80048 BASIC METABOLIC PNL TOTAL CA: CPT

## 2020-09-20 PROCEDURE — 92526 ORAL FUNCTION THERAPY: CPT

## 2020-09-20 PROCEDURE — 36415 COLL VENOUS BLD VENIPUNCTURE: CPT

## 2020-09-20 PROCEDURE — 700102 HCHG RX REV CODE 250 W/ 637 OVERRIDE(OP): Performed by: INTERNAL MEDICINE

## 2020-09-20 PROCEDURE — 700105 HCHG RX REV CODE 258: Performed by: INTERNAL MEDICINE

## 2020-09-20 PROCEDURE — A9270 NON-COVERED ITEM OR SERVICE: HCPCS | Performed by: HOSPITALIST

## 2020-09-20 PROCEDURE — A9270 NON-COVERED ITEM OR SERVICE: HCPCS | Performed by: INTERNAL MEDICINE

## 2020-09-20 PROCEDURE — 700111 HCHG RX REV CODE 636 W/ 250 OVERRIDE (IP): Performed by: INTERNAL MEDICINE

## 2020-09-20 PROCEDURE — 83735 ASSAY OF MAGNESIUM: CPT | Mod: 91

## 2020-09-20 PROCEDURE — 700102 HCHG RX REV CODE 250 W/ 637 OVERRIDE(OP): Performed by: HOSPITALIST

## 2020-09-20 PROCEDURE — 770020 HCHG ROOM/CARE - TELE (206)

## 2020-09-20 RX ORDER — SODIUM CHLORIDE 1 G/1
2 TABLET ORAL
Status: DISCONTINUED | OUTPATIENT
Start: 2020-09-20 | End: 2020-09-21

## 2020-09-20 RX ORDER — BISACODYL 10 MG
10 SUPPOSITORY, RECTAL RECTAL
Status: DISCONTINUED | OUTPATIENT
Start: 2020-09-20 | End: 2020-09-25 | Stop reason: HOSPADM

## 2020-09-20 RX ORDER — MONTELUKAST SODIUM 10 MG/1
10 TABLET ORAL DAILY
Status: DISCONTINUED | OUTPATIENT
Start: 2020-09-21 | End: 2020-09-25 | Stop reason: HOSPADM

## 2020-09-20 RX ORDER — POLYETHYLENE GLYCOL 3350 17 G/17G
1 POWDER, FOR SOLUTION ORAL
Status: DISCONTINUED | OUTPATIENT
Start: 2020-09-20 | End: 2020-09-25 | Stop reason: HOSPADM

## 2020-09-20 RX ORDER — ACETAMINOPHEN 500 MG
500 TABLET ORAL 4 TIMES DAILY
Status: DISCONTINUED | OUTPATIENT
Start: 2020-09-20 | End: 2020-09-25 | Stop reason: HOSPADM

## 2020-09-20 RX ORDER — SOTALOL HYDROCHLORIDE 120 MG/1
120 TABLET ORAL EVERY EVENING
Status: DISCONTINUED | OUTPATIENT
Start: 2020-09-20 | End: 2020-09-23

## 2020-09-20 RX ORDER — MAGNESIUM SULFATE HEPTAHYDRATE 40 MG/ML
2 INJECTION, SOLUTION INTRAVENOUS ONCE
Status: COMPLETED | OUTPATIENT
Start: 2020-09-20 | End: 2020-09-20

## 2020-09-20 RX ORDER — MAGNESIUM SULFATE HEPTAHYDRATE 40 MG/ML
2 INJECTION, SOLUTION INTRAVENOUS ONCE
Status: COMPLETED | OUTPATIENT
Start: 2020-09-20 | End: 2020-09-21

## 2020-09-20 RX ORDER — SIMVASTATIN 20 MG
20 TABLET ORAL EVERY EVENING
Status: DISCONTINUED | OUTPATIENT
Start: 2020-09-20 | End: 2020-09-25 | Stop reason: HOSPADM

## 2020-09-20 RX ORDER — SODIUM CHLORIDE 1 G/1
2 TABLET ORAL ONCE
Status: COMPLETED | OUTPATIENT
Start: 2020-09-20 | End: 2020-09-20

## 2020-09-20 RX ORDER — AMOXICILLIN 250 MG
2 CAPSULE ORAL 2 TIMES DAILY
Status: DISCONTINUED | OUTPATIENT
Start: 2020-09-20 | End: 2020-09-25 | Stop reason: HOSPADM

## 2020-09-20 RX ORDER — FERROUS SULFATE 325(65) MG
325 TABLET ORAL
Status: DISCONTINUED | OUTPATIENT
Start: 2020-09-21 | End: 2020-09-23

## 2020-09-20 RX ORDER — POTASSIUM CHLORIDE 20 MEQ/1
40 TABLET, EXTENDED RELEASE ORAL ONCE
Status: COMPLETED | OUTPATIENT
Start: 2020-09-20 | End: 2020-09-20

## 2020-09-20 RX ORDER — SOTALOL HYDROCHLORIDE 80 MG/1
80 TABLET ORAL EVERY MORNING
Status: DISCONTINUED | OUTPATIENT
Start: 2020-09-21 | End: 2020-09-23

## 2020-09-20 RX ADMIN — ACETAMINOPHEN 500 MG: 500 TABLET ORAL at 21:53

## 2020-09-20 RX ADMIN — Medication 2 G: at 12:51

## 2020-09-20 RX ADMIN — Medication 2 G: at 16:15

## 2020-09-20 RX ADMIN — MAGNESIUM SULFATE 2 G: 2 INJECTION INTRAVENOUS at 19:53

## 2020-09-20 RX ADMIN — DOCUSATE SODIUM 50 MG AND SENNOSIDES 8.6 MG 2 TABLET: 8.6; 5 TABLET, FILM COATED ORAL at 16:17

## 2020-09-20 RX ADMIN — SODIUM CHLORIDE: 9 INJECTION, SOLUTION INTRAVENOUS at 05:11

## 2020-09-20 RX ADMIN — POTASSIUM CHLORIDE 40 MEQ: 1500 TABLET, EXTENDED RELEASE ORAL at 19:53

## 2020-09-20 RX ADMIN — APIXABAN 5 MG: 5 TABLET, FILM COATED ORAL at 16:15

## 2020-09-20 RX ADMIN — SOTALOL HYDROCHLORIDE 120 MG: 120 TABLET ORAL at 16:35

## 2020-09-20 RX ADMIN — METOPROLOL TARTRATE 12.5 MG: 25 TABLET, FILM COATED ORAL at 16:16

## 2020-09-20 RX ADMIN — SIMVASTATIN 20 MG: 20 TABLET, FILM COATED ORAL at 16:15

## 2020-09-20 RX ADMIN — ACETAMINOPHEN 500 MG: 500 TABLET ORAL at 16:15

## 2020-09-20 RX ADMIN — ACETAMINOPHEN 500 MG: 500 TABLET ORAL at 12:51

## 2020-09-20 ASSESSMENT — FIBROSIS 4 INDEX: FIB4 SCORE: 0.75

## 2020-09-20 ASSESSMENT — PAIN DESCRIPTION - PAIN TYPE: TYPE: ACUTE PAIN

## 2020-09-20 ASSESSMENT — ENCOUNTER SYMPTOMS
COUGH: 0
BLURRED VISION: 0
FEVER: 0
ABDOMINAL PAIN: 1
HEADACHES: 0

## 2020-09-20 NOTE — ASSESSMENT & PLAN NOTE
Multifactorial with iron deficiency and anemia of chronic disease.  Normal TSH, B12.  Start oral iron replacement.

## 2020-09-20 NOTE — THERAPY
"Speech Language Pathology  Daily Treatment     Patient Name: Jordan Alexander  Age:  75 y.o., Sex:  male  Medical Record #: [id number]  Today's Date: 9/20/2020     Precautions  Precautions: Fall Risk, Swallow Precautions ( See Comments)  Comments: GLF    Assessment    Patient seen for reassessment of swallowing function.  Unable to replace Cortrak after the patient pulled it.  He was pleasant and agreeable but confused.  Vocal quality clear but characterized as gravelly.  Presentation of PO included ice chips, mildly thick liquids, applesauce, pudding, soft solids, and thin liquids.  The patient was impulsive and required 1:1 cues to reduce bolus size of soft solids during self-feeding tasks.  He demonstrated swishing of liquids in the oral cavity which resulted in strong reflexive coughing and turning red in the face with thin liquids and subtle throat clearing with mildly thick liquids.  He discontinued swishing when cued to a quick swallow, which eliminated s/sx concerning for aspiration with mildly thick liquids.  No difficulty with applesauce or pudding.     Plan    1. Initiate a Puree/Mildly Thick Liquid diet with DIRECT supervision during all PO intake  2. Provide cues to \"quick swallow\" to eliminate swishing liquids in the oral cavity  3. Crush meds in puree  4. Hold meals with increased confusion   5. SLP following.  Please hold PO intake and text/call SLP with any difficulty    Continue current treatment plan.    Discharge Recommendations: Recommend post-acute placement for additional speech therapy services prior to discharge home    Subjective    Patient pleasantly confused.  When cued to temporal orientation the patient stated, \"Boy, I'm confused!\"     Objective       09/20/20 1055   Dysphagia    Dysphagia X   Positioning / Behavior Modification Supraglottic Swallow;Modulate Rate or Bite Size;Other (see Comments)  (HOB elevated to 90/chair, no straws)   Diet / Liquid Recommendation Mildly Thick (2) - (Nectar " "Thick);Puree (4)   Nutritional Liquid Intake Rating Scale Thickened beverages (mildly thick unless otherwise specified)   Nutritional Food Intake Rating Scale Total oral diet of a single consistency   Nursing Communication Swallow Precaution Sign Posted at Head of Bed   Skilled Intervention Tactile Cueing;Verbal Cueing   Recommended Route of Medication Administration   Medication Administration  Crush all Medications in Puree   Patient / Family Goals   Patient / Family Goal #1 \"I sure am hungry\"   Short Term Goals   Short Term Goal # 2 B  Pt will consume PU4/MT2 diet with no overt s/sx of aspiration, given min cues to swallowing strategies         "

## 2020-09-20 NOTE — PROGRESS NOTES
[facility] Medicine Daily Progress Note    Date of Service  9/19/2020    Chief Complaint  75 y.o. male admitted 9/12/2020 with altered mental status.     Hospital Course    75 y.o. male who presented 9/12/2020 with altered mental status.  Apparently the patient was transferred for evaluation of altered mental status that started this morning progressively got worse was associated with some right-sided weakness that started around 11:05 a.m..  The patient was transferred as a code stroke he was evaluated by neurology and felt that his symptoms are likely related to hypertensive and possibly metabolic encephalopathy.      Interval Problem Update  Unable to replace cortrak despite multiple attempts.  Last Eliquis 9/17 due to no p.o. access, will allow this in preparation for possible OR cortrak placement.  Unable to start salt tabs due to lack of oral access, continuing NS but at a reduced rate.  AM cortisol wnl.  Elevated urine protein/creatinine ratio.  Additional studies pending per nephrology.  Macrocytic anemia with low iron, low TIBC.    Consultants/Specialty  neuro    Code Status  Full Code    Disposition  CM and wife working on choice for SNF placement    Review of Systems  Review of Systems   Unable to perform ROS: Mental status change        Physical Exam  Temp:  [36.5 °C (97.7 °F)-37.3 °C (99.1 °F)] 36.9 °C (98.5 °F)  Pulse:  [75-90] 82  Resp:  [16] 16  BP: (142-182)/(72-97) 182/89  SpO2:  [92 %-96 %] 95 %    Physical Exam  Vitals signs reviewed.   Constitutional:       General: He is not in acute distress.     Appearance: He is ill-appearing.   HENT:      Head: Normocephalic and atraumatic.      Nose: No congestion.      Mouth/Throat:      Mouth: Mucous membranes are moist.   Eyes:      Extraocular Movements: Extraocular movements intact.      Pupils: Pupils are equal, round, and reactive to light.   Neck:      Musculoskeletal: Neck supple.   Cardiovascular:      Rate and Rhythm: Normal rate and regular  rhythm.      Pulses: Normal pulses.      Heart sounds: Normal heart sounds.   Pulmonary:      Effort: Pulmonary effort is normal. No respiratory distress.      Breath sounds: Normal breath sounds. No wheezing.   Abdominal:      General: Bowel sounds are normal. There is no distension.      Palpations: Abdomen is soft.      Tenderness: There is no abdominal tenderness.   Musculoskeletal:         General: No swelling.   Skin:     General: Skin is warm and dry.      Capillary Refill: Capillary refill takes less than 2 seconds.   Neurological:      Mental Status: He is alert. He is disoriented and confused.      Motor: Motor function is intact.   Psychiatric:         Attention and Perception: Attention normal.         Mood and Affect: Affect is inappropriate.         Speech: Speech is delayed and tangential.         Behavior: Behavior is uncooperative and withdrawn. Behavior is not agitated or aggressive.         Fluids    Intake/Output Summary (Last 24 hours) at 9/19/2020 1827  Last data filed at 9/18/2020 1900  Gross per 24 hour   Intake --   Output 250 ml   Net -250 ml       Laboratory  Recent Labs     09/17/20  1742   WBC 9.5   RBC 3.45*   HEMOGLOBIN 12.3*   HEMATOCRIT 34.2*   .0*   MCH 35.9*   MCHC 35.9*   RDW 47.1   PLATELETCT 460*   MPV 10.4     Recent Labs     09/19/20  0039 09/19/20  0702 09/19/20  1211   SODIUM 123* 124* 127*   POTASSIUM 3.6 3.3* 3.4*   CHLORIDE 79* 81* 83*   CO2 29 28 31   GLUCOSE 118* 115* 106*   BUN 22 22 23*   CREATININE 0.58 0.48* 0.51   CALCIUM 9.3 9.1 8.9                   Imaging  DX-ABDOMEN FOR TUBE PLACEMENT   Final Result      Feeding tube tip at the GE junction.      DX-ABDOMEN FOR TUBE PLACEMENT   Final Result      Enteric tube projects over the distal esophagus. Recommend advancement.      [id number] VIEW   Final Result      Feeding tube tip projects over the distal esophagus, near the level of the gastroesophageal junction        Abdominal x-ray for feeding tube  placement dated today personally interpreted by me.  Feeding tube is advanced to the GE junction only.    Assessment/Plan  * Acute encephalopathy- (present on admission)  Assessment & Plan  Etiology is not entirely clear patient evaluated by neurology not felt to be a TPA candidate.  Possible hypertensive encephalopathy and some component of metabolic encephalopathy.  CT head and CTA head and neck reviewed.  No evidence of acute infection, procal wnl.  MRI of brain with chronic but no acute abnormalities.   EEG with encephalopathy, no seizure captured.  Not polypharmacy given med list.  Cont with Prn BP medications.  Continue pt/ot/speech therapies  Most likely etiology is hyponatremia.  We will continue to treat this underlying cause as noted.    Hypokalemia- (present on admission)  Assessment & Plan  Improved with supplementation.  Replete and monitor closely.    Hyponatremia- (present on admission)  Assessment & Plan  Most likely cause of his acute encephalopathy.  Low serum osm, likely hypovolemic on presentation, but did not improve with NS. No new urine osm collected, was high on admission.  High urine sodium may suggest SIADH, but this is a diagnosis of exclusion and there is no clear provoking factor for SIADH.  Free water flushes are being held and free water components of IV medications minimized.  On 9/16, physical exam was difficult due to patient's mental status, but elevated BNP suggested hypervolemia.  No improvement with furosemide.  Continue to closely monitor scheduled labs q6h.  Nephrology consulted, appreciate recommendations.  Unable to follow fully due to lack of oral access.    Macrocytic anemia- (present on admission)  Assessment & Plan  Multifactorial with iron deficiency and anemia of chronic disease.  Normal TSH, B12.  Iron replacement is appropriate once oral access has been established.    Hypomagnesemia- (present on admission)  Assessment & Plan  Replete and monitor  closely.    Paroxysmal atrial fibrillation (HCC)- (present on admission)  Assessment & Plan  Continue Eliquis and sotalol. Sotalol being held for hypokalemia.    Dyslipidemia- (present on admission)  Assessment & Plan  Continue simvastatin    Back pain- (present on admission)  Assessment & Plan  unremarkbale on xray       VTE prophylaxis: eliquis (held since 9/17 for lack of PO access and possible need for endoscopic cortrak placement)

## 2020-09-20 NOTE — PROGRESS NOTES
Monitor Summary: SR 74-87, CO 0.24, QRS 0.08, QT 0.40, with rare PVCs and occasional PACs per strip from monitor room.

## 2020-09-20 NOTE — CARE PLAN
Problem: Safety  Goal: Will remain free from injury  Outcome: PROGRESSING AS EXPECTED  Note: Bed locked and in lowest position, bed alarm on. Q2H restraint monitoring and hourly rounding in place.     Problem: Knowledge Deficit  Goal: Knowledge of disease process/condition, treatment plan, diagnostic tests, and medications will improve  Outcome: PROGRESSING SLOWER THAN EXPECTED  Note: Patient updated on plan of care; discussed continued NPO status, pending speech evaluation, and potential OR placement of feeding tube. Patient A&Ox2, requiring frequent reorientation/ frequent reminders about plan of care.

## 2020-09-20 NOTE — PROGRESS NOTES
Monitor Summary: SR 74-91, TX .24, QRS .08, QT .40 with occasional PVC,rare PAC per strip from monitor room

## 2020-09-20 NOTE — CONSULTS
"[facility] -  PROGRESS NOTE               Author: Mesfin Alanis M.D. Date & Time: 9/20/2020  9:33 AM       REASON FOR CONSULTATION:   - Hyponatremia    CHIEF COMPLAINT:   -  \"Low Sodium  \"    HISTORY OF PRESENT ILLNESS:    Per report as patient is not able to give history, patient is a 75 y.o. male who presented 9/12/2020 with altered mental status.  Apparently the patient was transferred for evaluation of altered mental status a few days ago progressively got worse was associated with some right-sided weakness that started in the morning.  Also reported to have a fall a week prior to presentation.  The patient was transferred as a code stroke he was evaluated by neurology and felt that his symptoms are likely related to hypertensive and possibly metabolic encephalopathy.  Etiology of encephalopathy was unclear thought maybe metabolic ?hyponatremia as cause.  EEG with no seizures, no evidence of infection.  Imaging with no evidence of acute infarct.    Nephrology consulted for hyponatremia.      Patient seen at bedside was AOx3 per nurse but now sleeping from Ativan.    Daily Nephrology Summary  9/18 - Consult done  9/19 - Patient was started on NS at 100cc/hr last night.  Sodium fluctuating between 120 and 124.  Complains of joint pain and abdominal pain.  9/20 - Not alert to place.  Still with slight abdominal pain.  Denies nausea.  No SOB.      REVIEW OF SYSTEMS:    Review of Systems   Constitutional: Negative for fever.   HENT: Negative for hearing loss.    Eyes: Negative for blurred vision.   Respiratory: Negative for cough.    Cardiovascular: Negative for chest pain.   Gastrointestinal: Positive for abdominal pain.   Genitourinary: Negative for dysuria.   Musculoskeletal: Positive for joint pain.   Skin: Negative for rash.   Neurological: Negative for headaches.         PAST MEDICAL HISTORY:   pAfib  Dyslipidemia  Back Pain      PAST SURGICAL HISTORY:   No past surgical history on " "file.      FAMILY HISTORY:   - Reviewed and non contributory to current illness    SOCIAL HISTORY:   Social History     Socioeconomic History   • Marital status:      Spouse name: Not on file   • Number of children: Not on file   • Years of education: Not on file   • Highest education level: Not on file   Occupational History   • Not on file   Social Needs   • Financial resource strain: Not on file   • Food insecurity     Worry: Not on file     Inability: Not on file   • Transportation needs     Medical: Not on file     Non-medical: Not on file   Tobacco Use   • Smoking status: Former Smoker   • Smokeless tobacco: Never Used   Substance and Sexual Activity   • Alcohol use: Not on file   • Drug use: Not on file   • Sexual activity: Not on file   Lifestyle   • Physical activity     Days per week: Not on file     Minutes per session: Not on file   • Stress: Not on file   Relationships   • Social connections     Talks on phone: Not on file     Gets together: Not on file     Attends Hinduism service: Not on file     Active member of club or organization: Not on file     Attends meetings of clubs or organizations: Not on file     Relationship status: Not on file   • Intimate partner violence     Fear of current or ex partner: Not on file     Emotionally abused: Not on file     Physically abused: Not on file     Forced sexual activity: Not on file   Other Topics Concern   • Not on file   Social History Narrative   • Not on file       HOME MEDICATIONS:   - Reviewed and documented in chart    LABORATORY STUDIES:   - Reviewed and documented in chart    ALLERGIES:  Patient has no known allergies.    VS:  /68   Pulse 78   Temp 36.4 °C (97.6 °F) (Temporal)   Resp 16   Ht 1.702 m (5' 7.01\")   Wt 77.4 kg (170 lb 10.2 oz)   SpO2 93%   BMI 26.72 kg/m²   Physical Exam  Constitutional:       Comments: Asleep, difficult to arouse   HENT:      Head: Normocephalic and atraumatic.      Nose: Nose normal.      " Mouth/Throat:      Mouth: Mucous membranes are moist.   Cardiovascular:      Rate and Rhythm: Normal rate and regular rhythm.      Pulses: Normal pulses.      Heart sounds: No murmur.   Pulmonary:      Effort: Pulmonary effort is normal.   Abdominal:      General: Abdomen is flat. There is no distension.      Palpations: Abdomen is soft.      Tenderness: There is no abdominal tenderness. There is no guarding.   Skin:     General: Skin is warm and dry.   Neurological:      Mental Status: He is disoriented.         LABS:  Recent Results (from the past 24 hour(s))   Basic Metabolic Panel    Collection Time: 09/19/20 12:11 PM   Result Value Ref Range    Sodium 127 (L) 135 - 145 mmol/L    Potassium 3.4 (L) 3.6 - 5.5 mmol/L    Chloride 83 (L) 96 - 112 mmol/L    Co2 31 20 - 33 mmol/L    Glucose 106 (H) 65 - 99 mg/dL    Bun 23 (H) 8 - 22 mg/dL    Creatinine 0.51 0.50 - 1.40 mg/dL    Calcium 8.9 8.5 - 10.5 mg/dL    Anion Gap 13.0 7.0 - 16.0   MAGNESIUM    Collection Time: 09/19/20 12:11 PM   Result Value Ref Range    Magnesium 1.9 1.5 - 2.5 mg/dL   ESTIMATED GFR    Collection Time: 09/19/20 12:11 PM   Result Value Ref Range    GFR If African American >60 >60 mL/min/1.73 m 2    GFR If Non African American >60 >60 mL/min/1.73 m 2   Basic Metabolic Panel    Collection Time: 09/19/20  6:16 PM   Result Value Ref Range    Sodium 125 (L) 135 - 145 mmol/L    Potassium 3.5 (L) 3.6 - 5.5 mmol/L    Chloride 82 (L) 96 - 112 mmol/L    Co2 25 20 - 33 mmol/L    Glucose 105 (H) 65 - 99 mg/dL    Bun 20 8 - 22 mg/dL    Creatinine 0.50 0.50 - 1.40 mg/dL    Calcium 9.2 8.5 - 10.5 mg/dL    Anion Gap 18.0 (H) 7.0 - 16.0   MAGNESIUM    Collection Time: 09/19/20  6:16 PM   Result Value Ref Range    Magnesium 1.8 1.5 - 2.5 mg/dL   ESTIMATED GFR    Collection Time: 09/19/20  6:16 PM   Result Value Ref Range    GFR If African American >60 >60 mL/min/1.73 m 2    GFR If Non African American >60 >60 mL/min/1.73 m 2   PHOSPHORUS 24 HR URINE    Collection  Time: 09/19/20 10:15 PM   Result Value Ref Range    Collection Length 24 Hrs    Total Volume 1300mL mL   Basic Metabolic Panel    Collection Time: 09/20/20 12:20 AM   Result Value Ref Range    Sodium 124 (L) 135 - 145 mmol/L    Potassium 3.8 3.6 - 5.5 mmol/L    Chloride 82 (L) 96 - 112 mmol/L    Co2 25 20 - 33 mmol/L    Glucose 99 65 - 99 mg/dL    Bun 22 8 - 22 mg/dL    Creatinine 0.49 (L) 0.50 - 1.40 mg/dL    Calcium 9.0 8.5 - 10.5 mg/dL    Anion Gap 17.0 (H) 7.0 - 16.0   MAGNESIUM    Collection Time: 09/20/20 12:20 AM   Result Value Ref Range    Magnesium 1.7 1.5 - 2.5 mg/dL   ESTIMATED GFR    Collection Time: 09/20/20 12:20 AM   Result Value Ref Range    GFR If African American >60 >60 mL/min/1.73 m 2    GFR If Non African American >60 >60 mL/min/1.73 m 2   Basic Metabolic Panel    Collection Time: 09/20/20  6:42 AM   Result Value Ref Range    Sodium 125 (L) 135 - 145 mmol/L    Potassium 3.6 3.6 - 5.5 mmol/L    Chloride 83 (L) 96 - 112 mmol/L    Co2 24 20 - 33 mmol/L    Glucose 97 65 - 99 mg/dL    Bun 25 (H) 8 - 22 mg/dL    Creatinine 0.59 0.50 - 1.40 mg/dL    Calcium 8.9 8.5 - 10.5 mg/dL    Anion Gap 18.0 (H) 7.0 - 16.0   MAGNESIUM    Collection Time: 09/20/20  6:42 AM   Result Value Ref Range    Magnesium 1.9 1.5 - 2.5 mg/dL   ESTIMATED GFR    Collection Time: 09/20/20  6:42 AM   Result Value Ref Range    GFR If African American >60 >60 mL/min/1.73 m 2    GFR If Non African American >60 >60 mL/min/1.73 m 2       (click the triangle to expand results)    IMAGING:  DX-ABDOMEN FOR TUBE PLACEMENT   Final Result      Feeding tube tip at the GE junction.      DX-ABDOMEN FOR TUBE PLACEMENT   Final Result      Enteric tube projects over the distal esophagus. Recommend advancement.      GQ-VASKRLE-0 VIEW   Final Result      Feeding tube tip projects over the distal esophagus, near the level of the gastroesophageal junction      DX-CHEST-PORTABLE (1 VIEW)   Final Result         Decreased mild interstitial pulmonary  edema.      DX-ABDOMEN FOR TUBE PLACEMENT   Final Result         Feeding tube with tip projecting over the expected area of the proximal stomach.      MR-BRAIN-W/O   Final Result      1.  No evidence of acute territorial infarct, intracranial hemorrhage or mass lesion.   2.  Moderate diffuse cerebral substance loss.   3.  Moderate microangiopathic ischemic change versus demyelination or gliosis.   4.  Chronic ischemic pontine gliosis.   5.  Small focus of chronic hemosiderin deposition within the left inferior frontal lobe likely related to remote hemorrhagic infarct or trauma.      DX-THORACOLUMBAR SPINE-2 VIEWS   Final Result      1.  No acute fracture or dislocation identified.      2.  Degenerative changes are noted.      DX-CHEST-PORTABLE (1 VIEW)   Final Result      1.  Cardiomegaly.      2.  Mild bilateral patchy pulmonary infiltrates.      CT-CTA NECK WITH & W/O-POST PROCESSING   Final Result      1.  Atherosclerotic plaque involving the common and internal carotid arteries bilaterally most prominently seen at the bifurcations. This results in less than 50% diameter narrowing bilaterally.      2.  Atherosclerotic plaque involving the vertebral arteries bilaterally. No evidence of occlusion.      CT-CEREBRAL PERFUSION ANALYSIS   Final Result      1.  Cerebral blood flow less than 30% likely representing completed infarct = 0 mL.      2.  T Max more than 6 seconds likely representing combination of completed infarct and ischemia = 267 mL.      3.  Mismatched volume likely representing ischemic brain/penumbra = 267 mL      4.  Please note that the cerebral perfusion was performed on the limited brain tissue around the basal ganglia region. Infarct/ischemia outside the CT perfusion sections can be missed in this study.      CT-CTA HEAD WITH & W/O-POST PROCESS   Final Result      No evidence of intracranial vascular occlusion or aneurysm.      CT-HEAD W/O   Final Result         NO ACUTE ABNORMALITIES ARE NOTED ON  CT SCAN OF THE HEAD.      Findings are consistent with atrophy.  Decreased attenuation in the periventricular white matter likely indicates microvascular ischemic disease.          IMPRESSION:  - Hyponatremia    * Urine sodium elevated and initially with elevated urine osm as well 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 but since better with IVF possibly a mixed, unclear cause but he is complaining of some abdominal pain and joint pain    * Patient is euvolemic on exam with low serum osm    * However, other things to consider include cerebral salt wasting but patient generally would be hypovolemic on exam, with more UOP    * Sodium better with higher rate if NS IVF    * Agree with increase NS to 100cc/hr, can decrease or hold if sodium decreases    * ?Fanconi given elevated urine glucose with no history of diabetes  - Proteinuria    * Given slightly elevated urine protein and anemia will send for SPEP/ UPEP MATTIE    * Small blood in urine likely from enrique, don't suspect GN    * Can repeat UA when enrique is removed  - Acute Encephalopathy    * Unclear cause thought to be from hyponatremia    * Continue management per primary team and neurology  - Anemia    * Anemia of chronic disease    * Epo not indicated    * Ferritin 550  - Hypokalemia    * Possibly from diuretics and hypomagnesemia    * Hold diuretics for now continue with repletion  - Hypomagnesemia    * Resolved    * Replete PRN  - Paroxysmal Atrial Fibrillation    * management per primary team  - HTN    * More recently blood pressure is in a better range    * Target goal per neurology  - Dyslipidemia  - Back Pain    PLAN:  - NS as above, decrease rate if sodium decreases  - Continue salt tablets 2g TID  - Accurate I/Os while on tube feeds  - Avoid nephrotoxic agents  - Follow up SPEP/UPEP, light chains  - Replete potassium PRN      All prior notes form other doctors and RN staff were reviewed from admission to current day to help me make my clinical decisions    Thank you for the  consultation!

## 2020-09-20 NOTE — PROGRESS NOTES
Per report, patient to be seen by speech for swallow eval before potential OR intervention as there have been many unsuccessful attempts at cortrak placement. DAVID Pan paged and notified; orders for speech eval received and placed.

## 2020-09-21 LAB
ANION GAP SERPL CALC-SCNC: 13 MMOL/L (ref 7–16)
ANION GAP SERPL CALC-SCNC: 13 MMOL/L (ref 7–16)
BUN SERPL-MCNC: 22 MG/DL (ref 8–22)
BUN SERPL-MCNC: 27 MG/DL (ref 8–22)
CALCIUM SERPL-MCNC: 9 MG/DL (ref 8.5–10.5)
CALCIUM SERPL-MCNC: 9.1 MG/DL (ref 8.5–10.5)
CHLORIDE SERPL-SCNC: 88 MMOL/L (ref 96–112)
CHLORIDE SERPL-SCNC: 88 MMOL/L (ref 96–112)
CO2 SERPL-SCNC: 27 MMOL/L (ref 20–33)
CO2 SERPL-SCNC: 27 MMOL/L (ref 20–33)
COLLECT DURATION TIME SPEC: NORMAL HRS
COLLECT DURATION TIME SPEC: NORMAL HRS
CREAT 24H UR-MCNC: 21 MG/DL
CREAT 24H UR-MRATE: NORMAL MG/D (ref 800–2100)
CREAT SERPL-MCNC: 0.49 MG/DL (ref 0.5–1.4)
CREAT SERPL-MCNC: 0.61 MG/DL (ref 0.5–1.4)
CRP SERPL HS-MCNC: 11.86 MG/DL (ref 0–0.75)
GLUCOSE SERPL-MCNC: 118 MG/DL (ref 65–99)
GLUCOSE SERPL-MCNC: 118 MG/DL (ref 65–99)
KAPPA LC FREE 24H UR-MRATE: NORMAL MG/D
KAPPA LC FREE UR-MCNC: 15.85 MG/L (ref 0–32.9)
LAMBDA LC FREE 24H UR-MRATE: NORMAL MG/D
LAMBDA LC FREE UR-MCNC: 1.77 MG/L (ref 0–3.79)
MAGNESIUM SERPL-MCNC: 2.3 MG/DL (ref 1.5–2.5)
MAGNESIUM SERPL-MCNC: 2.4 MG/DL (ref 1.5–2.5)
MAGNESIUM SERPL-MCNC: 2.5 MG/DL (ref 1.5–2.5)
PHOSPHATE 24H UR-MCNC: 12 MG/DL
PHOSPHATE 24H UR-MRATE: NORMAL MG/D (ref 400–1300)
PHOSPHATE/CREAT 24H UR: 571 MG/G
POTASSIUM SERPL-SCNC: 3.2 MMOL/L (ref 3.6–5.5)
POTASSIUM SERPL-SCNC: 3.6 MMOL/L (ref 3.6–5.5)
PREALB SERPL-MCNC: 9.8 MG/DL (ref 18–38)
PROT 24H UR-MRATE: NORMAL MG/D
SODIUM SERPL-SCNC: 128 MMOL/L (ref 135–145)
SODIUM SERPL-SCNC: 128 MMOL/L (ref 135–145)
SPECIMEN VOL ?TM UR: NORMAL ML
TOTAL VOLUME 1105: NORMAL ML

## 2020-09-21 PROCEDURE — 84134 ASSAY OF PREALBUMIN: CPT

## 2020-09-21 PROCEDURE — 700102 HCHG RX REV CODE 250 W/ 637 OVERRIDE(OP): Performed by: INTERNAL MEDICINE

## 2020-09-21 PROCEDURE — A9270 NON-COVERED ITEM OR SERVICE: HCPCS | Performed by: INTERNAL MEDICINE

## 2020-09-21 PROCEDURE — 97112 NEUROMUSCULAR REEDUCATION: CPT | Mod: CO

## 2020-09-21 PROCEDURE — 84425 ASSAY OF VITAMIN B-1: CPT

## 2020-09-21 PROCEDURE — 36415 COLL VENOUS BLD VENIPUNCTURE: CPT

## 2020-09-21 PROCEDURE — 83036 HEMOGLOBIN GLYCOSYLATED A1C: CPT

## 2020-09-21 PROCEDURE — 770020 HCHG ROOM/CARE - TELE (206)

## 2020-09-21 PROCEDURE — 99232 SBSQ HOSP IP/OBS MODERATE 35: CPT | Performed by: INTERNAL MEDICINE

## 2020-09-21 PROCEDURE — 80048 BASIC METABOLIC PNL TOTAL CA: CPT

## 2020-09-21 PROCEDURE — 97535 SELF CARE MNGMENT TRAINING: CPT | Mod: CO

## 2020-09-21 PROCEDURE — 86140 C-REACTIVE PROTEIN: CPT

## 2020-09-21 PROCEDURE — 83735 ASSAY OF MAGNESIUM: CPT | Mod: 91

## 2020-09-21 PROCEDURE — 700111 HCHG RX REV CODE 636 W/ 250 OVERRIDE (IP): Performed by: INTERNAL MEDICINE

## 2020-09-21 RX ORDER — AMLODIPINE BESYLATE 5 MG/1
5 TABLET ORAL
Status: DISCONTINUED | OUTPATIENT
Start: 2020-09-21 | End: 2020-09-23

## 2020-09-21 RX ORDER — POTASSIUM CHLORIDE 20 MEQ/1
40 TABLET, EXTENDED RELEASE ORAL ONCE
Status: COMPLETED | OUTPATIENT
Start: 2020-09-21 | End: 2020-09-21

## 2020-09-21 RX ORDER — POTASSIUM CHLORIDE 20 MEQ/1
40 TABLET, EXTENDED RELEASE ORAL DAILY
Status: DISCONTINUED | OUTPATIENT
Start: 2020-09-21 | End: 2020-09-22

## 2020-09-21 RX ORDER — THIAMINE MONONITRATE (VIT B1) 100 MG
100 TABLET ORAL DAILY
Status: DISCONTINUED | OUTPATIENT
Start: 2020-09-21 | End: 2020-09-25 | Stop reason: HOSPADM

## 2020-09-21 RX ADMIN — ACETAMINOPHEN 500 MG: 500 TABLET ORAL at 08:08

## 2020-09-21 RX ADMIN — ACETAMINOPHEN 500 MG: 500 TABLET ORAL at 13:50

## 2020-09-21 RX ADMIN — FERROUS SULFATE TAB 325 MG (65 MG ELEMENTAL FE) 325 MG: 325 (65 FE) TAB at 08:08

## 2020-09-21 RX ADMIN — METOPROLOL TARTRATE 12.5 MG: 25 TABLET, FILM COATED ORAL at 06:01

## 2020-09-21 RX ADMIN — Medication 2 G: at 08:08

## 2020-09-21 RX ADMIN — MAGNESIUM SULFATE 2 G: 2 INJECTION INTRAVENOUS at 01:08

## 2020-09-21 RX ADMIN — AMLODIPINE BESYLATE 5 MG: 5 TABLET ORAL at 21:25

## 2020-09-21 RX ADMIN — POTASSIUM CHLORIDE 40 MEQ: 1500 TABLET, EXTENDED RELEASE ORAL at 10:14

## 2020-09-21 RX ADMIN — APIXABAN 5 MG: 5 TABLET, FILM COATED ORAL at 17:39

## 2020-09-21 RX ADMIN — APIXABAN 5 MG: 5 TABLET, FILM COATED ORAL at 06:02

## 2020-09-21 RX ADMIN — SOTALOL HYDROCHLORIDE 120 MG: 120 TABLET ORAL at 17:39

## 2020-09-21 RX ADMIN — SOTALOL HYDROCHLORIDE 80 MG: 80 TABLET ORAL at 06:02

## 2020-09-21 RX ADMIN — POTASSIUM CHLORIDE 40 MEQ: 1500 TABLET, EXTENDED RELEASE ORAL at 13:50

## 2020-09-21 RX ADMIN — MONTELUKAST 10 MG: 10 TABLET, FILM COATED ORAL at 06:02

## 2020-09-21 RX ADMIN — ACETAMINOPHEN 500 MG: 500 TABLET ORAL at 21:25

## 2020-09-21 RX ADMIN — SIMVASTATIN 20 MG: 20 TABLET, FILM COATED ORAL at 17:39

## 2020-09-21 RX ADMIN — ACETAMINOPHEN 500 MG: 500 TABLET ORAL at 17:39

## 2020-09-21 RX ADMIN — Medication 100 MG: at 10:00

## 2020-09-21 ASSESSMENT — COGNITIVE AND FUNCTIONAL STATUS - GENERAL
DRESSING REGULAR UPPER BODY CLOTHING: A LITTLE
SUGGESTED CMS G CODE MODIFIER DAILY ACTIVITY: CL
DAILY ACTIVITIY SCORE: 13
EATING MEALS: A LOT
PERSONAL GROOMING: A LOT
TOILETING: A LOT
HELP NEEDED FOR BATHING: A LOT
DRESSING REGULAR LOWER BODY CLOTHING: A LOT

## 2020-09-21 ASSESSMENT — ENCOUNTER SYMPTOMS
VOMITING: 0
POLYDIPSIA: 0
ABDOMINAL PAIN: 0
EYE DISCHARGE: 0
MYALGIAS: 0
CHILLS: 0
NERVOUS/ANXIOUS: 0
EYE PAIN: 0
FOCAL WEAKNESS: 0
NAUSEA: 0
BACK PAIN: 0
INSOMNIA: 0
SHORTNESS OF BREATH: 0
HEADACHES: 0
COUGH: 0
FEVER: 0
DIARRHEA: 0

## 2020-09-21 ASSESSMENT — PAIN DESCRIPTION - PAIN TYPE
TYPE: ACUTE PAIN

## 2020-09-21 NOTE — PROGRESS NOTES
Monitor Summary: SR rate 65-78, SD -.20, QRS -.10, QT -.40, with rare and occasional PVCs, rare and occasional PACs, rare couplet, rare trigeminy per strip from the monitor room.
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
## 2020-09-21 NOTE — DIETARY
Nutrition Services: Cortrak feeding tube pulled by pt on 9/18, RN reports difficulty getting feed tube replace. Diet advanced by SLP yesterday: Regular, Pureed/Dysphagia I with mildly thick liquids and 1:1 supervision. Per RN, pt eating well and tooke ~ 50% of breakfast and lunch. Pt with low Na+ (128) and K+ (3.2), Nephrology following:  plan is to hold NS/salt tabs for now, KCL ordered.    Plan/Rec: Continue diet as determined safe per SLP. Monitor and replete electrolytes PRN. RD to screen weekly for adequate intake.   
Nutrition Services: Nutrition Support Assessment  Day 1 of admit.  Jordan Alexander is a 75 y.o. male with admitting DX of AMS (altered mental status), Hypertensive urgency     Current problem list:  1. Acute encephalopathy  2. Hyponatremia  3. Paroxysmal atrial fibrillation  4. Back pain  5. Dyslipidemia  6. Hypokalemia     Assessment:  Estimated Nutritional Needs: based on: Ht: 170.2 cm, Wt : 77.4 kg via bed scale, IBW: 67.1 kg, BMI: 26.72 - Overweight     Calculation/Equation: REE per MSJ x 1.2 = 1757 kcal/day  Total Calories / day: 1700 - 2000  (Calories / k - 26)  Total Grams Protein / day: 85 - 108  (Grams Protein / k.1 - 1.4)  Total Fluids ml / day: 1938.4 ml    Evaluation:   1. Consult received for TF assessment. Pt failed SLP evaluation.   2. Enteral access: Cortrak to be placed.    3. Labs: Na 126, K 3.2, Glucose 118, Creatinine 0.45  4. Meds: eliquis, magnesium sulfate, klyte, pericolace, zocor, betapace  5. Fluids: NS infusion @ 125 mL/hr  6. Pt will benefit from standard formula at this time.      Malnutrition Risk: No criteria noted at this time     Recommendations/Plan:  Start Fibersource HN @ 25 ml/hr and advance per protocol to 65 ml/hr (goal rate) to provide 1872 kcal (24 kcal/kg), 84 grams protein (1.1 gm/kg), and 1264 ml free water per day.   Fluids per MD.   Diet upgrades per SLP  Monitor weight.        RD following  
no chest pain, no cough, and no shortness of breath.

## 2020-09-21 NOTE — ASSESSMENT & PLAN NOTE
Patient has been unable to swallow safely, per speech/swallow eval.  We have been unable to maintain oral access via feeding tube.  On 9/20, mentation improved and patient passed swallow eval, which should help his management significantly.  Continue oral medications.

## 2020-09-21 NOTE — CARE PLAN
Problem: Safety  Goal: Will remain free from injury  Outcome: PROGRESSING AS EXPECTED     Problem: Mobility  Goal: Risk for activity intolerance will decrease  Outcome: PROGRESSING AS EXPECTED  Note: Pt mobilized to chair by occupational therapy, tolerated well      Problem: Safety - Medical Restraint  Goal: Remains free of injury from restraints (Restraint for Interference with Medical Device)  Description: INTERVENTIONS:  1. Determine that other, less restrictive measures have been tried or would not be effective before applying the restraint  2. Evaluate the patient's condition at the time of restraint application  3. Inform patient/family regarding the reason for restraint  4. Q2H: Monitor safety, psychosocial status, comfort, nutrition and hydration  Outcome: PROGRESSING SLOWER THAN EXPECTED  Flowsheets (Taken 9/21/2020 1257)  Addressed this shift: Remains free of injury from restraints (restraint for interference with medical device): Every 2 hours: Monitor safety, psychosocial status, comfort, nutrition and hydration  Note: Pt pulled off tele monitor this AM, notified Dr. John, Dr. John stated she would renew restraint order

## 2020-09-21 NOTE — PROGRESS NOTES
[facility] Medicine Daily Progress Note    Date of Service  9/20/2020    Chief Complaint  75 y.o. male admitted 9/12/2020 with altered mental status.     Hospital Course    75 y.o. male who presented 9/12/2020 with altered mental status.  Apparently the patient was transferred for evaluation of altered mental status that started this morning progressively got worse was associated with some right-sided weakness that started around 11:05 a.m..  The patient was transferred as a code stroke he was evaluated by neurology and felt that his symptoms are likely related to hypertensive and possibly metabolic encephalopathy.      Interval Problem Update  Passed swallow eval this morning.  Restarted Eliquis, salt tabs.  Discontinued IV fluids.  Additional studies pending per nephrology.    Mental status is a little better this morning.  Patient is alert, oriented to person, time, but not to place.  Continuing restraints, as he pulled out one of his IVs last night and has been fiddling with his Laird.    Consultants/Specialty  Neurology  Nephrology    Code Status  Full Code    Disposition  CM and wife working on choice for SNF placement    Review of Systems  Review of Systems   Unable to perform ROS: Mental status change        Physical Exam  Temp:  [36.1 °C (97 °F)-36.8 °C (98.2 °F)] 36.6 °C (97.9 °F)  Pulse:  [78-87] 79  Resp:  [16-20] 16  BP: (106-163)/(62-88) 163/62  SpO2:  [91 %-93 %] 91 %    Physical Exam  Vitals signs reviewed.   Constitutional:       General: He is not in acute distress.     Appearance: He is ill-appearing.   HENT:      Head: Normocephalic and atraumatic.      Nose: No congestion.      Mouth/Throat:      Mouth: Mucous membranes are moist.   Eyes:      Extraocular Movements: Extraocular movements intact.      Pupils: Pupils are equal, round, and reactive to light.   Neck:      Musculoskeletal: Neck supple.   Cardiovascular:      Rate and Rhythm: Normal rate and regular rhythm.      Pulses: Normal pulses.       Heart sounds: Normal heart sounds.   Pulmonary:      Effort: Pulmonary effort is normal. No respiratory distress.      Breath sounds: Normal breath sounds. No wheezing.   Abdominal:      General: Bowel sounds are normal. There is no distension.      Palpations: Abdomen is soft.      Tenderness: There is no abdominal tenderness.   Musculoskeletal:         General: No swelling.   Skin:     General: Skin is warm and dry.      Capillary Refill: Capillary refill takes less than 2 seconds.   Neurological:      Mental Status: He is alert. He is disoriented and confused.      Motor: Motor function is intact.   Psychiatric:         Attention and Perception: Attention normal.         Mood and Affect: Affect is inappropriate.         Speech: Speech is delayed and tangential.         Behavior: Behavior is uncooperative and withdrawn. Behavior is not agitated or aggressive.         Fluids    Intake/Output Summary (Last 24 hours) at 9/20/2020 1727  Last data filed at 9/20/2020 1300  Gross per 24 hour   Intake 100 ml   Output 500 ml   Net -400 ml       Laboratory  Recent Labs     09/17/20  1742   WBC 9.5   RBC 3.45*   HEMOGLOBIN 12.3*   HEMATOCRIT 34.2*   .0*   MCH 35.9*   MCHC 35.9*   RDW 47.1   PLATELETCT 460*   MPV 10.4     Recent Labs     09/20/20  0020 09/20/20  0642 09/20/20  1249   SODIUM 124* 125* 127*   POTASSIUM 3.8 3.6 3.2*   CHLORIDE 82* 83* 86*   CO2 25 24 28   GLUCOSE 99 97 118*   BUN 22 25* 23*   CREATININE 0.49* 0.59 0.58   CALCIUM 9.0 8.9 8.9                   Imaging  No new imaging.    Assessment/Plan  * Acute encephalopathy- (present on admission)  Assessment & Plan  Improving.    Etiology is not entirely clear patient evaluated by neurology not felt to be a TPA candidate.  Possible hypertensive encephalopathy and some component of metabolic encephalopathy.  CT head and CTA head and neck reviewed.  No evidence of acute infection, procal wnl.  MRI of brain with chronic but no acute abnormalities.    EEG with encephalopathy, no seizure captured.  Not polypharmacy given med list.  Cont with Prn BP medications.  Continue pt/ot/speech therapies  Most likely etiology is hyponatremia.  We will continue to treat this underlying cause as noted.    Dysphasia- (present on admission)  Assessment & Plan  Patient has been unable to swallow safely, per speech/swallow eval.  We have been unable to maintain oral access via feeding tube.  On 9/20, mentation improved and patient passed swallow eval, which should help his management significantly.  Restart oral medications.    Hypokalemia- (present on admission)  Assessment & Plan  Improves with supplementation.  Replete and monitor closely.    Hyponatremia- (present on admission)  Assessment & Plan  Improving.  Most likely cause of his acute encephalopathy.  Low serum osm, likely hypovolemic on presentation, but did not improve with NS. No new urine osm collected, was high on admission.  High urine sodium may suggest SIADH, but this is a diagnosis of exclusion and there is no clear provoking factor for SIADH.  Free water flushes are being held and free water components of IV medications minimized.  On 9/16, physical exam was difficult due to patient's mental status, but elevated BNP suggested hypervolemia.  No improvement with furosemide.  Continue to closely monitor scheduled labs q6h.  Nephrology consulted, appreciate recommendations.     Macrocytic anemia- (present on admission)  Assessment & Plan  Multifactorial with iron deficiency and anemia of chronic disease.  Normal TSH, B12.  Start oral iron replacement.    Hypomagnesemia- (present on admission)  Assessment & Plan  Replete and monitor closely.     Paroxysmal atrial fibrillation (HCC)- (present on admission)  Assessment & Plan  Continue Eliquis and sotalol. Sotalol being held for hypokalemia.    Dyslipidemia- (present on admission)  Assessment & Plan  Continue simvastatin    Back pain- (present on  admission)  Assessment & Plan  unremarkbale on xray       VTE prophylaxis: eliquis

## 2020-09-21 NOTE — PROGRESS NOTES
Monitor summary: SR 67-85, MA .24, QRS .08, QT .42 with rare PVCs, rare PACs per strip from monitor room.

## 2020-09-21 NOTE — PROGRESS NOTES
[facility] Nephrology Daily Progress Note    Date of Service  9/21/2020    HISTORY OF PRESENT ILLNESS:    Per report as patient is not able to give history, patient is a 75 y.o. male who presented 9/12/2020 with altered mental status.  Apparently the patient was transferred for evaluation of altered mental status a few days ago progressively got worse was associated with some right-sided weakness that started in the morning.  Also reported to have a fall a week prior to presentation.  The patient was transferred as a code stroke he was evaluated by neurology and felt that his symptoms are likely related to hypertensive and possibly metabolic encephalopathy.  Etiology of encephalopathy was unclear thought maybe metabolic ?hyponatremia as cause.  EEG with no seizures, no evidence of infection.  Imaging with no evidence of acute infarct.     Nephrology consulted for hyponatremia.       Patient seen at bedside was A[facility]3 per [facility]     Daily Nephrology Summary  9/18 - Consult done  9/19 - Patient was started on NS at 100cc/hr last night.  Sodium fluctuating between 120 and 124.  Complains of joint pain and abdominal pain.  9/20 - Not alert to place.  Still with slight abdominal pain.  Denies nausea.  No SOB.  9/21 - alert and oriented x 3 this AM, answers questions appropriately        Review of Systems  Review of Systems   Constitutional: Negative for chills and fever.   HENT: Negative for congestion, ear pain and nosebleeds.    Eyes: Negative for pain and discharge.   Respiratory: Negative for cough and shortness of breath.    Cardiovascular: Negative for chest pain and leg swelling.   Gastrointestinal: Negative for abdominal pain, diarrhea, nausea and vomiting.   Genitourinary: Negative for dysuria, frequency, hematuria and urgency.   Musculoskeletal: Negative for back pain and myalgias.   Neurological: Negative for focal weakness and headaches.   Endo/Heme/Allergies: Negative for  environmental allergies and polydipsia.   Psychiatric/Behavioral: The patient is not nervous/anxious and does not have insomnia.         Physical Exam  Temp:  [36.1 °C (97 °F)-36.8 °C (98.2 °F)] 36.4 °C (97.6 °F)  Pulse:  [64-84] 64  Resp:  [16-18] 16  BP: (148-179)/(62-91) 153/91  SpO2:  [91 %-95 %] 94 %    Physical Exam  Constitutional:       General: He is not in acute distress.  HENT:      Head: Normocephalic. No abrasion.      Right Ear: External ear normal.      Left Ear: External ear normal.      Nose: Nose normal.      Mouth/Throat:      Mouth: Mucous membranes are dry.      Pharynx: Oropharynx is clear.   Eyes:      General: No scleral icterus.        Right eye: No discharge.         Left eye: No discharge.      Extraocular Movements: Extraocular movements intact.   Neck:      Musculoskeletal: Neck supple. No neck rigidity or muscular tenderness.   Cardiovascular:      Rate and Rhythm: Normal rate.   Pulmonary:      Effort: Pulmonary effort is normal.      Breath sounds: Normal breath sounds.   Abdominal:      General: Abdomen is flat.      Palpations: Abdomen is soft.   Musculoskeletal:      Right lower leg: No edema.      Left lower leg: No edema.   Skin:     General: Skin is warm and dry.   Neurological:      General: No focal deficit present.      Mental Status: He is alert and oriented to person, place, and time.   Psychiatric:         Mood and Affect: Mood normal.         Behavior: Behavior normal.         Fluids    Intake/Output Summary (Last 24 hours) at 9/21/2020 0924  Last data filed at 9/20/2020 1800  Gross per 24 hour   Intake 100 ml   Output 420 ml   Net -320 ml       Laboratory      Recent Labs     09/20/20  1821 09/21/20  0026 09/21/20  0616   SODIUM 127* 128* 128*   POTASSIUM 3.2* 3.6 3.2*   CHLORIDE 86* 88* 88*   CO2 25 27 27   GLUCOSE 151* 118* 118*   BUN 26* 27* 22   CREATININE 0.78 0.61 0.49*   CALCIUM 9.1 9.1 9.0         No results for input(s): NTPROBNP in the last 72 hours.         Imaging  reviewed     Assessment/Plan  IMPRESSION:  - Hyponatremia    * Etiology mixed, urine lytes/osm collected after patient had been provided IVF, unclear reliability.     * TSH wnl, protein wnl; on leupron, but no documented h/o recent prostate procedure; prealbumin low, K low, no uric acid noted, BUN wnl/near normal; CO2 wnl, hypokalemia present since admission    * Patient is euvolemic on exam with low serum osm    * Na initially improved with IVF, then decreased again, has been labile; he has also received lasix; recently started on salt tabs      - Proteinuria    * Given slightly elevated urine protein and anemia check SPEP/ UPEP MATTIE--pending    * Small blood in urine likely from enrique, don't suspect GN    * Can repeat UA when enrique is removed    - Acute Encephalopathy    * Unclear etiology, though to be associated with hyponatremia and HTN    * Continue management per primary team and neurology    - Anemia    * Anemia of chronic disease    * Ferritin 550, %sat 9    - Hypokalemia    * present since admission, may have worsened with diuretics use    * Hold diuretics     * Keep K >4    - Hypomagnesemia    * Resolved    * Replete PRN    * Keep Mg >2    - Paroxysmal Atrial Fibrillation    * management per primary team, on sotalol and metoprolol    - HTN    * goal <140/90     - Dyslipidemia    - Back Pain--none today    - hyperglycemia     PLAN:    - Hold NS and salt tabs for now  - KCl ordered, keep K>4, Mg>2  - Strict I/O  - High protein diet, high potassium diet  - Avoid nephrotoxic agents  - Follow up SPEP/UPEP, light chains  - start amlodipine 5mg qhs  - would taper/DC metoprolol if able, defer to primary service  - check A1c  - check metabolic panel q12 hours    PMH/PSH/FH/SH/meds/labs reviewed      Other management per primary service

## 2020-09-21 NOTE — CARE PLAN
Problem: Safety  Goal: Will remain free from injury  Outcome: PROGRESSING AS EXPECTED  Note: Patient encouraged to use call light for assistance to avoid injury/ falls. Patient expressing understanding. Bed locked and in lowest position, bed alarm on. Call light and belongings within reach. Hourly rounding and Q2H restraint monitoring in place.      Problem: Venous Thromboembolism (VTW)/Deep Vein Thrombosis (DVT) Prevention:  Goal: Patient will participate in Venous Thrombosis (VTE)/Deep Vein Thrombosis (DVT)Prevention Measures  Outcome: PROGRESSING AS EXPECTED  Note: Apixaban ordered and administered per MAR.

## 2020-09-21 NOTE — THERAPY
Occupational Therapy  Daily Treatment     Patient Name: Jordan Alexander  Age:  75 y.o., Sex:  male  Medical Record #: [id number]  Today's Date: 9/21/2020       Precautions: Fall Risk, Swallow Precautions ( See Comments)    Assessment    Pt seen for OT tx. Continues to be limited by decreased activity tolerance, cognitive deficits, inattention, balance deficits and generalized weakness impacting ability to complete ADLs and ADL transfers independently. Required cues for sequencing during ADLs as pt would get easily distracted. Pt hallucinating during session, RN aware. Pt pleasant, cooperative and agreeable to OOB activity. Will continue to benefit from OT services while in house.     Plan    Continue current treatment plan.    DC Equipment Recommendations: Unable to determine at this time    Discharge Recommendations: Recommend post-acute placement for additional occupational therapy services prior to discharge home       09/21/20 0940   Cognition    Cognition / Consciousness X   Safety Awareness Impaired   Attention Impaired   Bed Mobility    Supine to Sit Minimal Assist   Sit to Supine   (left up in chair )   Activities of Daily Living   Grooming Moderate Assist;Seated   Upper Body Dressing Minimal Assist   Lower Body Dressing Minimal Assist   Toileting Maximal Assist   Comments sequencing for ADLs d/t inattention and being easily distracted    Functional Mobility   Sit to Stand Minimal Assist   Bed, Chair, Wheelchair Transfer Minimal Assist   Short Term Goals   Short Term Goal # 1 Pt will groom seated with min assist    Goal Outcome # 1 Progressing as expected   Short Term Goal # 2 Pt will complete ADL transfers with min assist    Goal Outcome # 2 Progressing as expected   Short Term Goal # 3 Pt will dress LB with min assist    Goal Outcome # 3 Progressing as expected   Anticipated Discharge Equipment and Recommendations   DC Equipment Recommendations Unable to determine at this time   Discharge Recommendations  Recommend post-acute placement for additional occupational therapy services prior to discharge home

## 2020-09-22 LAB
ALBUMIN SERPL ELPH-MCNC: 3.61 G/DL (ref 3.75–5.01)
ALPHA1 GLOB SERPL ELPH-MCNC: 0.44 G/DL (ref 0.19–0.46)
ALPHA2 GLOB SERPL ELPH-MCNC: 0.95 G/DL (ref 0.48–1.05)
ANION GAP SERPL CALC-SCNC: 12 MMOL/L (ref 7–16)
ANION GAP SERPL CALC-SCNC: 14 MMOL/L (ref 7–16)
ANION GAP SERPL CALC-SCNC: 14 MMOL/L (ref 7–16)
B-GLOBULIN SERPL ELPH-MCNC: 0.94 G/DL (ref 0.48–1.1)
BASOPHILS # BLD AUTO: 0.5 % (ref 0–1.8)
BASOPHILS # BLD: 0.05 K/UL (ref 0–0.12)
BUN SERPL-MCNC: 18 MG/DL (ref 8–22)
BUN SERPL-MCNC: 20 MG/DL (ref 8–22)
BUN SERPL-MCNC: 27 MG/DL (ref 8–22)
CALCIUM SERPL-MCNC: 9.2 MG/DL (ref 8.5–10.5)
CALCIUM SERPL-MCNC: 9.4 MG/DL (ref 8.5–10.5)
CALCIUM SERPL-MCNC: 9.8 MG/DL (ref 8.5–10.5)
CHLORIDE SERPL-SCNC: 91 MMOL/L (ref 96–112)
CO2 SERPL-SCNC: 25 MMOL/L (ref 20–33)
CO2 SERPL-SCNC: 25 MMOL/L (ref 20–33)
CO2 SERPL-SCNC: 26 MMOL/L (ref 20–33)
CREAT SERPL-MCNC: 0.52 MG/DL (ref 0.5–1.4)
CREAT SERPL-MCNC: 0.57 MG/DL (ref 0.5–1.4)
CREAT SERPL-MCNC: 0.69 MG/DL (ref 0.5–1.4)
EOSINOPHIL # BLD AUTO: 0.16 K/UL (ref 0–0.51)
EOSINOPHIL NFR BLD: 1.7 % (ref 0–6.9)
ERYTHROCYTE [DISTWIDTH] IN BLOOD BY AUTOMATED COUNT: 50.7 FL (ref 35.9–50)
EST. AVERAGE GLUCOSE BLD GHB EST-MCNC: 114 MG/DL
GAMMA GLOB SERPL ELPH-MCNC: 0.55 G/DL (ref 0.62–1.51)
GLUCOSE SERPL-MCNC: 101 MG/DL (ref 65–99)
GLUCOSE SERPL-MCNC: 111 MG/DL (ref 65–99)
GLUCOSE SERPL-MCNC: 114 MG/DL (ref 65–99)
HBA1C MFR BLD: 5.6 % (ref 0–5.6)
HCT VFR BLD AUTO: 36.3 % (ref 42–52)
HEMOCCULT STL QL: POSITIVE
HGB BLD-MCNC: 12.4 G/DL (ref 14–18)
IGA SERPL-MCNC: 325 MG/DL (ref 68–408)
IGG SERPL-MCNC: 600 MG/DL (ref 768–1632)
IGM SERPL-MCNC: 53 MG/DL (ref 35–263)
IMM GRANULOCYTES # BLD AUTO: 0.04 K/UL (ref 0–0.11)
IMM GRANULOCYTES NFR BLD AUTO: 0.4 % (ref 0–0.9)
INTERPRETATION SERPL IFE-IMP: ABNORMAL
LYMPHOCYTES # BLD AUTO: 1.14 K/UL (ref 1–4.8)
LYMPHOCYTES NFR BLD: 12.2 % (ref 22–41)
MAGNESIUM SERPL-MCNC: 1.7 MG/DL (ref 1.5–2.5)
MCH RBC QN AUTO: 35.9 PG (ref 27–33)
MCHC RBC AUTO-ENTMCNC: 34.2 G/DL (ref 33.7–35.3)
MCV RBC AUTO: 105.2 FL (ref 81.4–97.8)
MONOCLON BAND OBS SERPL: ABNORMAL
MONOCYTES # BLD AUTO: 1.15 K/UL (ref 0–0.85)
MONOCYTES NFR BLD AUTO: 12.4 % (ref 0–13.4)
NEUTROPHILS # BLD AUTO: 6.77 K/UL (ref 1.82–7.42)
NEUTROPHILS NFR BLD: 72.8 % (ref 44–72)
NRBC # BLD AUTO: 0 K/UL
NRBC BLD-RTO: 0 /100 WBC
PATHOLOGY STUDY: ABNORMAL
PLATELET # BLD AUTO: 614 K/UL (ref 164–446)
PMV BLD AUTO: 10.7 FL (ref 9–12.9)
POTASSIUM SERPL-SCNC: 4.1 MMOL/L (ref 3.6–5.5)
POTASSIUM SERPL-SCNC: 4.2 MMOL/L (ref 3.6–5.5)
POTASSIUM SERPL-SCNC: 4.2 MMOL/L (ref 3.6–5.5)
PROT SERPL-MCNC: 6.5 G/DL (ref 6.3–8.2)
RBC # BLD AUTO: 3.45 M/UL (ref 4.7–6.1)
SODIUM SERPL-SCNC: 128 MMOL/L (ref 135–145)
SODIUM SERPL-SCNC: 130 MMOL/L (ref 135–145)
SODIUM SERPL-SCNC: 131 MMOL/L (ref 135–145)
WBC # BLD AUTO: 9.3 K/UL (ref 4.8–10.8)

## 2020-09-22 PROCEDURE — A9270 NON-COVERED ITEM OR SERVICE: HCPCS | Performed by: INTERNAL MEDICINE

## 2020-09-22 PROCEDURE — 83735 ASSAY OF MAGNESIUM: CPT

## 2020-09-22 PROCEDURE — 85025 COMPLETE CBC W/AUTO DIFF WBC: CPT

## 2020-09-22 PROCEDURE — 97116 GAIT TRAINING THERAPY: CPT

## 2020-09-22 PROCEDURE — 82272 OCCULT BLD FECES 1-3 TESTS: CPT

## 2020-09-22 PROCEDURE — 97530 THERAPEUTIC ACTIVITIES: CPT

## 2020-09-22 PROCEDURE — 80048 BASIC METABOLIC PNL TOTAL CA: CPT

## 2020-09-22 PROCEDURE — 97110 THERAPEUTIC EXERCISES: CPT

## 2020-09-22 PROCEDURE — 92526 ORAL FUNCTION THERAPY: CPT

## 2020-09-22 PROCEDURE — 770020 HCHG ROOM/CARE - TELE (206)

## 2020-09-22 PROCEDURE — 99232 SBSQ HOSP IP/OBS MODERATE 35: CPT | Performed by: HOSPITALIST

## 2020-09-22 PROCEDURE — 36415 COLL VENOUS BLD VENIPUNCTURE: CPT

## 2020-09-22 PROCEDURE — 700102 HCHG RX REV CODE 250 W/ 637 OVERRIDE(OP): Performed by: INTERNAL MEDICINE

## 2020-09-22 RX ORDER — POTASSIUM CHLORIDE 20 MEQ/1
10 TABLET, EXTENDED RELEASE ORAL DAILY
Status: DISCONTINUED | OUTPATIENT
Start: 2020-09-23 | End: 2020-09-23

## 2020-09-22 RX ADMIN — ACETAMINOPHEN 500 MG: 500 TABLET ORAL at 18:37

## 2020-09-22 RX ADMIN — APIXABAN 5 MG: 5 TABLET, FILM COATED ORAL at 05:41

## 2020-09-22 RX ADMIN — AMLODIPINE BESYLATE 5 MG: 5 TABLET ORAL at 19:57

## 2020-09-22 RX ADMIN — ACETAMINOPHEN 500 MG: 500 TABLET ORAL at 12:38

## 2020-09-22 RX ADMIN — Medication 100 MG: at 05:40

## 2020-09-22 RX ADMIN — DOCUSATE SODIUM 50 MG AND SENNOSIDES 8.6 MG 2 TABLET: 8.6; 5 TABLET, FILM COATED ORAL at 05:40

## 2020-09-22 RX ADMIN — SOTALOL HYDROCHLORIDE 120 MG: 120 TABLET ORAL at 18:37

## 2020-09-22 RX ADMIN — APIXABAN 5 MG: 5 TABLET, FILM COATED ORAL at 19:57

## 2020-09-22 RX ADMIN — SIMVASTATIN 20 MG: 20 TABLET, FILM COATED ORAL at 18:37

## 2020-09-22 RX ADMIN — POTASSIUM CHLORIDE 40 MEQ: 1500 TABLET, EXTENDED RELEASE ORAL at 05:41

## 2020-09-22 RX ADMIN — ACETAMINOPHEN 500 MG: 500 TABLET ORAL at 19:57

## 2020-09-22 RX ADMIN — FERROUS SULFATE TAB 325 MG (65 MG ELEMENTAL FE) 325 MG: 325 (65 FE) TAB at 09:16

## 2020-09-22 RX ADMIN — SOTALOL HYDROCHLORIDE 80 MG: 80 TABLET ORAL at 05:40

## 2020-09-22 RX ADMIN — MONTELUKAST 10 MG: 10 TABLET, FILM COATED ORAL at 06:00

## 2020-09-22 RX ADMIN — ACETAMINOPHEN 500 MG: 500 TABLET ORAL at 09:16

## 2020-09-22 ASSESSMENT — COGNITIVE AND FUNCTIONAL STATUS - GENERAL
MOVING FROM LYING ON BACK TO SITTING ON SIDE OF FLAT BED: A LITTLE
MOVING TO AND FROM BED TO CHAIR: A LITTLE
WALKING IN HOSPITAL ROOM: A LITTLE
CLIMB 3 TO 5 STEPS WITH RAILING: A LOT
SUGGESTED CMS G CODE MODIFIER MOBILITY: CK
MOBILITY SCORE: 18
STANDING UP FROM CHAIR USING ARMS: A LITTLE

## 2020-09-22 ASSESSMENT — ENCOUNTER SYMPTOMS
HEADACHES: 0
INSOMNIA: 0
ABDOMINAL PAIN: 0
COUGH: 0
EYE DISCHARGE: 0
VOMITING: 0
NAUSEA: 0
FEVER: 0
SHORTNESS OF BREATH: 0
POLYDIPSIA: 0
NERVOUS/ANXIOUS: 0
FOCAL WEAKNESS: 0
BACK PAIN: 0
MYALGIAS: 0
CHILLS: 0
EYE PAIN: 0
DIARRHEA: 0

## 2020-09-22 ASSESSMENT — GAIT ASSESSMENTS
DISTANCE (FEET): 220
DEVIATION: DECREASED TOE OFF;DECREASED HEEL STRIKE;BRADYKINETIC
GAIT LEVEL OF ASSIST: MINIMAL ASSIST
ASSISTIVE DEVICE: FRONT WHEEL WALKER

## 2020-09-22 ASSESSMENT — PAIN DESCRIPTION - PAIN TYPE
TYPE: ACUTE PAIN

## 2020-09-22 NOTE — PROGRESS NOTES
Monitor summary: SR with rare/occasional PVCs, AR 0.20, QRS 0.08, QT 0.44, HR 60s-70s per strip from monitor room.

## 2020-09-22 NOTE — PROGRESS NOTES
"Hospital Medicine Daily Progress Note    Date of Service  9/22/2020    Chief Complaint  75 y.o. male admitted 9/12/2020 with altered mental status.     Hospital Course    75 y.o. male who presented 9/12/2020 with altered mental status.  Apparently the patient was transferred for evaluation of altered mental status that started this morning progressively got worse was associated with some right-sided weakness that started around 11:05 a.m..  The patient was transferred as a code stroke he was evaluated by neurology and felt that his symptoms are likely related to hypertensive and possibly metabolic encephalopathy.      Interval Problem Update  Tolerating oral intake.    Alert but confused still  Admits to 2-3 whiskey drinks per night for \"a while\"    Continued restraints for pulling at lines.    Consultants/Specialty  Neurology  Nephrology    Code Status  Full Code    Disposition  CM and wife working on choice for SNF placement    Review of Systems  Review of Systems   Unable to perform ROS: Mental status change        Physical Exam  Temp:  [35.9 °C (96.7 °F)-36.6 °C (97.8 °F)] 36.2 °C (97.2 °F)  Pulse:  [64-81] 69  Resp:  [16-18] 16  BP: (125-167)/(67-89) 125/70  SpO2:  [92 %-97 %] 95 %    Physical Exam  Vitals signs reviewed.   Constitutional:       General: He is not in acute distress.     Appearance: He is ill-appearing.   HENT:      Head: Normocephalic and atraumatic.      Nose: No congestion.      Mouth/Throat:      Mouth: Mucous membranes are moist.   Eyes:      Extraocular Movements: Extraocular movements intact.      Pupils: Pupils are equal, round, and reactive to light.   Neck:      Musculoskeletal: Neck supple.   Cardiovascular:      Rate and Rhythm: Normal rate and regular rhythm.      Pulses: Normal pulses.      Heart sounds: Normal heart sounds.   Pulmonary:      Effort: Pulmonary effort is normal. No respiratory distress.      Breath sounds: Normal breath sounds. No wheezing.   Abdominal:      " General: Bowel sounds are normal. There is no distension.      Palpations: Abdomen is soft.      Tenderness: There is no abdominal tenderness.   Musculoskeletal:         General: No swelling.   Skin:     General: Skin is warm and dry.      Capillary Refill: Capillary refill takes less than 2 seconds.   Neurological:      Mental Status: He is alert and oriented to person, place, and time. He is confused.      Motor: Motor function is intact.   Psychiatric:         Attention and Perception: Attention normal. He perceives visual hallucinations.         Mood and Affect: Affect is inappropriate.         Speech: Speech is delayed and tangential.         Behavior: Behavior is uncooperative and withdrawn. Behavior is not agitated or aggressive.         Fluids    Intake/Output Summary (Last 24 hours) at 9/22/2020 1435  Last data filed at 9/22/2020 0922  Gross per 24 hour   Intake 340 ml   Output 1900 ml   Net -1560 ml       Laboratory      Recent Labs     09/21/20  0616 09/21/20  2332 09/22/20  1011   SODIUM 128* 130* 131*   POTASSIUM 3.2* 4.1 4.2   CHLORIDE 88* 91* 91*   CO2 27 25 26   GLUCOSE 118* 114* 111*   BUN 22 20 18   CREATININE 0.49* 0.52 0.57   CALCIUM 9.0 9.2 9.8                   Imaging  No new imaging.    Assessment/Plan  * Acute encephalopathy- (present on admission)  Assessment & Plan  Improving.    Etiology is not entirely clear patient evaluated by neurology not felt to be a TPA candidate.  Possible hypertensive encephalopathy and some component of metabolic encephalopathy.  CT head and CTA head and neck reviewed.  No evidence of acute infection, procal wnl.  MRI of brain with chronic but no acute abnormalities.   EEG with encephalopathy, no seizure captured.  Not polypharmacy given med list.  Cont with Prn BP medications.  Continue pt/ot/speech therapies  Most likely etiology is hyponatremia.  We will continue to treat this underlying cause as noted.    Dysphasia- (present on admission)  Assessment &  Plan  Patient has been unable to swallow safely, per speech/swallow eval.  We have been unable to maintain oral access via feeding tube.  On 9/20, mentation improved and patient passed swallow eval, which should help his management significantly.  Continue oral medications.    Hypokalemia- (present on admission)  Assessment & Plan  Improves with supplementation.  Replete and monitor closely.    Hyponatremia- (present on admission)  Assessment & Plan  Improving.  Most likely cause of his acute encephalopathy.  Low serum osm, likely hypovolemic on presentation, but did not improve with NS. No new urine osm collected, was high on admission.  High urine sodium may suggest SIADH, but this is a diagnosis of exclusion and there is no clear provoking factor for SIADH.  Free water flushes are being held and free water components of IV medications minimized.  On 9/16, physical exam was difficult due to patient's mental status, but elevated BNP suggested hypervolemia.  No improvement with furosemide.  Continue to closely monitor scheduled labs q6h.  Nephrology consulted, appreciate recommendations.     Macrocytic anemia- (present on admission)  Assessment & Plan  Multifactorial with iron deficiency and anemia of chronic disease.  Normal TSH, B12.  Start oral iron replacement.    Hypomagnesemia- (present on admission)  Assessment & Plan  Replete and monitor closely.     Paroxysmal atrial fibrillation (HCC)- (present on admission)  Assessment & Plan  Continue Eliquis and sotalol. Sotalol being held for hypokalemia.    Dyslipidemia- (present on admission)  Assessment & Plan  Continue simvastatin    Back pain- (present on admission)  Assessment & Plan  unremarkbale on xray     Suspect low Na and chronic etoh abuse   SLP for cog eval  neph following    VTE prophylaxis: eliquis

## 2020-09-22 NOTE — PROGRESS NOTES
With patient’s permission, completed daily phone call to designated support person, name Erasmo  Discussed patient condition and plan of care. All questions answered.  Follow up requested: Update wife with any changes or otherwise update when at bedside later today.     1440: Pt had large loose dark bowel movement, stool sample collected. Paged Dr. Langford to update.     1500: Received call back, received orders for CBC today at 1700, CBC routine tomorrow at 0300, and occult stool lab telephone with readback.      1820: Paged Dr. Langford to notify of positive occult blood in stool, awaiting call back.

## 2020-09-22 NOTE — DISCHARGE PLANNING
Anticipated Discharge Disposition:   Acute rehab vs SNF    Action:    Tiger text to Dr. Langford and verbal order obtained to place physiatrist consult.    RN CHRIS left vm with patient's spouse with request to return call for choices.    Barriers to Discharge:    Medical clearance  Choices    Plan:    F/U with patient's spouse.  F/U with [facility] acute rehab [facility].

## 2020-09-22 NOTE — CARE PLAN
Problem: Skin Integrity  Goal: Risk for impaired skin integrity will decrease  Outcome: PROGRESSING AS EXPECTED     Problem: Urinary Elimination:  Goal: Ability to reestablish a normal urinary elimination pattern will improve  Outcome: PROGRESSING AS EXPECTED     Problem: Safety  Goal: Will remain free from falls  Outcome: PROGRESSING AS EXPECTED     Problem: Safety  Goal: Will remain free from injury  Outcome: PROGRESSING AS EXPECTED

## 2020-09-22 NOTE — PROGRESS NOTES
[facility] Nephrology Daily Progress Note    Date of Service  9/22/2020    HISTORY OF PRESENT ILLNESS:    Per report as patient is not able to give history, patient is a 75 y.o. male who presented 9/12/2020 with altered mental status.  Apparently the patient was transferred for evaluation of altered mental status a few days ago progressively got worse was associated with some right-sided weakness that started in the morning.  Also reported to have a fall a week prior to presentation.  The patient was transferred as a code stroke he was evaluated by neurology and felt that his symptoms are likely related to hypertensive and possibly metabolic encephalopathy.  Etiology of encephalopathy was unclear thought maybe metabolic ?hyponatremia as cause.  EEG with no seizures, no evidence of infection.  Imaging with no evidence of acute infarct.     Nephrology consulted for hyponatremia.       Patient seen at bedside [facility]3 [facility]     Daily Nephrology Summary  9/18 - Consult done  9/19 - Patient was started on NS at 100cc/hr last night.  Sodium fluctuating between 120 and 124.  Complains of joint pain and abdominal pain.  9/20 - Not alert to place.  Still with slight abdominal pain.  Denies nausea.  No SOB.  9/21 - alert and oriented x 3 this AM, answers questions appropriately  9/22 - alert and oriented x 3, Na and K improved        Review of Systems  Review of Systems   Constitutional: Negative for chills and fever.   HENT: Negative for congestion, ear pain and nosebleeds.    Eyes: Negative for pain and discharge.   Respiratory: Negative for cough and shortness of breath.    Cardiovascular: Negative for chest pain and leg swelling.   Gastrointestinal: Negative for abdominal pain, diarrhea, nausea and vomiting.   Genitourinary: Negative for dysuria, frequency, hematuria and urgency.   Musculoskeletal: Negative for back pain and myalgias.   Neurological: Negative for focal weakness and  headaches.   Endo/Heme/Allergies: Negative for environmental allergies and polydipsia.   Psychiatric/Behavioral: The patient is not nervous/anxious and does not have insomnia.         Physical Exam  Temp:  [35.9 °C (96.7 °F)-36.6 °C (97.8 °F)] 36.2 °C (97.2 °F)  Pulse:  [64-81] 69  Resp:  [16-18] 16  BP: (125-167)/(67-89) 125/70  SpO2:  [92 %-97 %] 95 %    Physical Exam  Constitutional:       General: He is not in acute distress.  HENT:      Head: Normocephalic. No abrasion.      Right Ear: External ear normal.      Left Ear: External ear normal.      Nose: Nose normal.      Mouth/Throat:      Mouth: Mucous membranes are dry.      Pharynx: Oropharynx is clear.   Eyes:      General: No scleral icterus.        Right eye: No discharge.         Left eye: No discharge.      Extraocular Movements: Extraocular movements intact.   Neck:      Musculoskeletal: Neck supple. No neck rigidity or muscular tenderness.   Cardiovascular:      Rate and Rhythm: Normal rate.   Pulmonary:      Effort: Pulmonary effort is normal.      Breath sounds: Normal breath sounds.   Abdominal:      General: Abdomen is flat.      Palpations: Abdomen is soft.   Musculoskeletal:      Right lower leg: No edema.      Left lower leg: No edema.   Skin:     General: Skin is warm and dry.   Neurological:      General: No focal deficit present.      Mental Status: He is alert and oriented to person, place, and time.   Psychiatric:         Mood and Affect: Mood normal.         Behavior: Behavior normal.         Fluids    Intake/Output Summary (Last 24 hours) at 9/22/2020 1316  Last data filed at 9/22/2020 0922  Gross per 24 hour   Intake 580 ml   Output 1900 ml   Net -1320 ml       Laboratory      Recent Labs     09/21/20  0616 09/21/20  2332 09/22/20  1011   SODIUM 128* 130* 131*   POTASSIUM 3.2* 4.1 4.2   CHLORIDE 88* 91* 91*   CO2 27 25 26   GLUCOSE 118* 114* 111*   BUN 22 20 18   CREATININE 0.49* 0.52 0.57   CALCIUM 9.0 9.2 9.8         No results for  input(s): NTPROBNP in the last 72 hours.        Imaging  reviewed     Assessment/Plan  IMPRESSION:  - Hyponatremia    * Etiology mixed, urine lytes/osm collected after patient had been provided IVF, unclear reliability.     * TSH wnl, protein wnl; on leupron, but no documented h/o recent prostate procedure; prealbumin low, K low, no uric acid noted, BUN wnl/near normal; CO2 wnl, hypokalemia present since admission    * Patient is euvolemic on exam with low serum osm    * Na initially improved with IVF, then decreased again, has been labile; he has also received lasix; recently started on salt tabs, subsequently DC      - Proteinuria    * Given slightly elevated urine protein and anemia check SPEP/ UPEP MATTIE--pending    * Small blood in urine likely from enrique, don't suspect GN    * Can repeat UA when enrique is removed    - Acute Encephalopathy    * Unclear etiology, though to be associated with hyponatremia and HTN    * Continue management per primary team and neurology    - Anemia    * Anemia of chronic disease    * Ferritin 550, %sat 9    - Hypokalemia    * present since admission, may have worsened with diuretics use    * Hold diuretics     * Keep K >4    - Hypomagnesemia    * Resolved    * Replete PRN    * Keep Mg >2    - Paroxysmal Atrial Fibrillation    * management per primary team, on sotalol and metoprolol    - HTN    * goal <140/90     - Dyslipidemia    - Back Pain--none today    - hyperglycemia     PLAN:    - Continue to hold NS and salt tabs for now  - KCl supplement ordered, keep K>4, Mg>2  - Strict I/O  - High protein diet, high potassium diet  - Avoid nephrotoxic agents  - Follow up SPEP/UPEP, light chains  - Continue amlodipine 5mg qhs  - would taper/DC metoprolol if able, defer to primary service  - check metabolic panel qam    PMH/PSH/FH/SH/meds/labs reviewed      Other management per primary service

## 2020-09-22 NOTE — THERAPY
"Speech Language Pathology  Daily Treatment     Patient Name: Jordan Alexander  Age:  75 y.o., Sex:  male  Medical Record #: [id number]  Today's Date: 9/22/2020     Precautions  Precautions: (P) Fall Risk, Swallow Precautions ( See Comments)  Comments: (P) GLF    Assessment    Pt seen for bfast meal after transferring to ryan-chair with RN. Pt following directives for tsp amounts versus heaping tsp. Swallows are triggered in 1-2 seconds. No coughing and clear voice post swallow. Approx 40% intake before pt stating he was full. Pt positioned at nurs station with RN aware. Pt w/ continued confused speech with cognitive eval orders requested. Nurs will d/w MD during rounds.     Plan    Continue current treatment plan.    Discharge Recommendations: (P) Recommend post-acute placement for additional speech therapy services prior to discharge home       09/22/20 0935   Voice   Comments WNL   Dysphagia    Dysphagia X   Positioning / Behavior Modification Modulate Rate or Bite Size;Alternate Solids and Liquids;Other (see Comments)  (sitting up in ryan chair)   Diet / Liquid Recommendation Mildly Thick (2) - (Nectar Thick)   Nutritional Liquid Intake Rating Scale Thickened beverages (mildly thick unless otherwise specified)   Nutritional Food Intake Rating Scale Total oral diet of a single consistency   Nursing Communication Swallow Precaution Sign Posted at Head of Bed   Skilled Intervention Compensatory Strategies;Verbal Cueing   Recommended Route of Medication Administration   Medication Administration  Crush all Medications in Puree   Patient / Family Goals   Patient / Family Goal #1 \"I sure am hungry\"   Goal #1 Outcome Progressing as expected   Short Term Goals   Short Term Goal # 2 B  Pt will consume PU4/MT2 diet with no overt s/sx of aspiration, given min cues to swallowing strategies   Goal Outcome  # 2 B Progressing as expected   Session Information   Priority 3  (3x,metabolicenceph,tolPU4/MT2w/1-1,request cogorders) "

## 2020-09-22 NOTE — THERAPY
Physical Therapy   Daily Treatment     Patient Name: Jordan Alexander  Age:  75 y.o., Sex:  male  Medical Record #: [id number]  Today's Date: 9/22/2020     Precautions: Fall Risk, Swallow Precautions ( See Comments)    Assessment    Pt progressing well per POC, ambulated x 220' today with FWW and Rodolfo in [place]. Participates well with PT.    Plan    Continue current treatment plan.    DC Equipment Recommendations: Unable to determine at this time  Discharge Recommendations: Recommend post-acute placement for additional physical therapy services prior to discharge home      Subjective    Pt agrees to PT.      Objective       09/22/20 1130   Cognition    Speech/ Communication Word Finding Impairment   Orientation Level Not Oriented to Place;Not Oriented to Month;Not Oriented to Year;Not Oriented to Day   Level of Consciousness Alert   Ability To Follow Commands 2 Step   Safety Awareness Impaired   Attention Impaired   Comments Pt cooperative.   Passive ROM Lower Body   Passive ROM Lower Body WDL   Active ROM Lower Body    Active ROM Lower Body  WDL   Strength Lower Body   Gross Strength Generalized Weakness, Equal Bilaterally   Sitting Lower Body Exercises   Long Arc Quad 2 sets of 10;Bilateral   Marching 3 sets of 10;Reciprocal   Sit to Stand   (x 3 reps)   Standing Lower Body Exercises   Marching 3 sets of 10   Mini Squat Partial;2 sets of 10   Balance   Sitting Balance (Static) Fair +   Sitting Balance (Dynamic) Fair   Standing Balance (Static) Fair   Standing Balance (Dynamic) Fair   Weight Shift Sitting Fair   Weight Shift Standing Fair   Skilled Intervention Verbal Cuing;Compensatory Strategies   Comments with FWW   Gait Analysis   Gait Level Of Assist Minimal Assist   Assistive Device Front Wheel Walker   Distance (Feet) 220   Deviation Decreased Toe Off;Decreased Heel Strike;Bradykinetic   Skilled Intervention Verbal Cuing;Tactile Cuing;Sequencing;Facilitation   Bed Mobility    Supine to Sit Minimal Assist   Scooting  Supervised   Skilled Intervention Verbal Cuing;Compensatory Strategies   Comments HOB elevated, pt using rails   Functional Mobility   Sit to Stand Minimal Assist   Bed, Chair, Wheelchair Transfer Minimal Assist   Transfer Method Stand Step   Mobility gait in [place]   Skilled Intervention Verbal Cuing;Tactile Cuing;Sequencing;Facilitation   Comments Cues for UE placement during STS   How much help from another person does the patient currently need...   6 clicks Mobility Score 18   Activity Tolerance   Standing 10 min total   Short Term Goals    Short Term Goal # 1 Pt will be SPV for supine<>sit at EOB in 6 txs to improve functional mobility.   Goal Outcome # 1 goal not met   Short Term Goal # 3 B Pt will be SPV for transfers with LRAD in 6 txs to improve functional mobility.   Goal Outcome # 3 B Goal not met   Short Term Goal # 4 B Pt will be SPV for gait > 200' with FWW to improve functional mobility.   Goal Outcome # 4 B Goal not met   Education Group   Role of Physical Therapist Patient Response Patient;Acceptance;Demonstration;Explanation;Action Demonstration;Verbal Demonstration;Reinforcement Needed   Anticipated Discharge Equipment and Recommendations   DC Equipment Recommendations Unable to determine at this time   Discharge Recommendations Recommend post-acute placement for additional physical therapy services prior to discharge home

## 2020-09-22 NOTE — PROGRESS NOTES
[facility] Medicine Daily Progress Note    Date of Service  9/21/2020    Chief Complaint  75 y.o. male admitted 9/12/2020 with altered mental status.     Hospital Course    75 y.o. male who presented 9/12/2020 with altered mental status.  Apparently the patient was transferred for evaluation of altered mental status that started this morning progressively got worse was associated with some right-sided weakness that started around 11:05 a.m..  The patient was transferred as a code stroke he was evaluated by neurology and felt that his symptoms are likely related to hypertensive and possibly metabolic encephalopathy.      Interval Problem Update  Tolerating oral intake.    AAOx3 but having some visual hallucinations.  Continued restraints for pulling at lines.      Consultants/Specialty  Neurology  Nephrology    Code Status  Full Code    Disposition  CM and wife working on choice for SNF placement    Review of Systems  Review of Systems   Unable to perform ROS: Mental status change        Physical Exam  Temp:  [36.1 °C (97 °F)-36.4 °C (97.6 °F)] 36.1 °C (97 °F)  Pulse:  [64-72] 64  Resp:  [16-18] 16  BP: (129-179)/(67-91) 129/67  SpO2:  [93 %-95 %] 95 %    Physical Exam  Vitals signs reviewed.   Constitutional:       General: He is not in acute distress.     Appearance: He is ill-appearing.   HENT:      Head: Normocephalic and atraumatic.      Nose: No congestion.      Mouth/Throat:      Mouth: Mucous membranes are moist.   Eyes:      Extraocular Movements: Extraocular movements intact.      Pupils: Pupils are equal, round, and reactive to light.   Neck:      Musculoskeletal: Neck supple.   Cardiovascular:      Rate and Rhythm: Normal rate and regular rhythm.      Pulses: Normal pulses.      Heart sounds: Normal heart sounds.   Pulmonary:      Effort: Pulmonary effort is normal. No respiratory distress.      Breath sounds: Normal breath sounds. No wheezing.   Abdominal:      General: Bowel sounds are normal. There is no  RN NOTES



CHECKED ON PT AND AS PER PT HE IS FEELING MUCH BETTER AT THE MOMENT. WILL CONTINUE TO 
MONITOR. distension.      Palpations: Abdomen is soft.      Tenderness: There is no abdominal tenderness.   Musculoskeletal:         General: No swelling.   Skin:     General: Skin is warm and dry.      Capillary Refill: Capillary refill takes less than 2 seconds.   Neurological:      Mental Status: He is alert and oriented to person, place, and time. He is confused.      Motor: Motor function is intact.   Psychiatric:         Attention and Perception: Attention normal. He perceives visual hallucinations.         Mood and Affect: Affect is inappropriate.         Speech: Speech is delayed and tangential.         Behavior: Behavior is uncooperative and withdrawn. Behavior is not agitated or aggressive.         Fluids    Intake/Output Summary (Last 24 hours) at 9/21/2020 2245  Last data filed at 9/21/2020 2200  Gross per 24 hour   Intake 460 ml   Output 900 ml   Net -440 ml       Laboratory      Recent Labs     09/20/20  1821 09/21/20  0026 09/21/20  0616   SODIUM 127* 128* 128*   POTASSIUM 3.2* 3.6 3.2*   CHLORIDE 86* 88* 88*   CO2 25 27 27   GLUCOSE 151* 118* 118*   BUN 26* 27* 22   CREATININE 0.78 0.61 0.49*   CALCIUM 9.1 9.1 9.0                   Imaging  No new imaging.    Assessment/Plan  * Acute encephalopathy- (present on admission)  Assessment & Plan  Improving.    Etiology is not entirely clear patient evaluated by neurology not felt to be a TPA candidate.  Possible hypertensive encephalopathy and some component of metabolic encephalopathy.  CT head and CTA head and neck reviewed.  No evidence of acute infection, procal wnl.  MRI of brain with chronic but no acute abnormalities.   EEG with encephalopathy, no seizure captured.  Not polypharmacy given med list.  Cont with Prn BP medications.  Continue pt/ot/speech therapies  Most likely etiology is hyponatremia.  We will continue to treat this underlying cause as noted.    Dysphasia- (present on admission)  Assessment & Plan  Patient has been unable to swallow safely,  per speech/swallow eval.  We have been unable to maintain oral access via feeding tube.  On 9/20, mentation improved and patient passed swallow eval, which should help his management significantly.  Continue oral medications.    Hypokalemia- (present on admission)  Assessment & Plan  Improves with supplementation.  Replete and monitor closely.    Hyponatremia- (present on admission)  Assessment & Plan  Improving.  Most likely cause of his acute encephalopathy.  Low serum osm, likely hypovolemic on presentation, but did not improve with NS. No new urine osm collected, was high on admission.  High urine sodium may suggest SIADH, but this is a diagnosis of exclusion and there is no clear provoking factor for SIADH.  Free water flushes are being held and free water components of IV medications minimized.  On 9/16, physical exam was difficult due to patient's mental status, but elevated BNP suggested hypervolemia.  No improvement with furosemide.  Continue to closely monitor scheduled labs q6h.  Nephrology consulted, appreciate recommendations.     Macrocytic anemia- (present on admission)  Assessment & Plan  Multifactorial with iron deficiency and anemia of chronic disease.  Normal TSH, B12.  Start oral iron replacement.    Hypomagnesemia- (present on admission)  Assessment & Plan  Replete and monitor closely.     Paroxysmal atrial fibrillation (HCC)- (present on admission)  Assessment & Plan  Continue Eliquis and sotalol. Sotalol being held for hypokalemia.    Dyslipidemia- (present on admission)  Assessment & Plan  Continue simvastatin    Back pain- (present on admission)  Assessment & Plan  unremarkbale on xray       VTE prophylaxis: eliquis

## 2020-09-22 NOTE — CARE PLAN
Problem: Venous Thromboembolism (VTW)/Deep Vein Thrombosis (DVT) Prevention:  Goal: Patient will participate in Venous Thrombosis (VTE)/Deep Vein Thrombosis (DVT)Prevention Measures  Outcome: PROGRESSING AS EXPECTED  Flowsheets  Taken 9/22/2020 0900  Pharmacologic Prophylaxis Used: Apixaban  Taken 9/22/2020 0800  Mechanical Prophylaxis: SCDs, Sequential Compression Device  SCDs, Sequential Compression Device: On     Problem: Knowledge Deficit  Goal: Knowledge of disease process/condition, treatment plan, diagnostic tests, and medications will improve  Outcome: PROGRESSING AS EXPECTED  Note: Educating pt and pt's wife Erasmo about treatment plan, medications, and tests. Answering all questions.      Problem: Safety - Medical Restraint  Goal: Remains free of injury from restraints (Restraint for Interference with Medical Device)  Description: INTERVENTIONS:  1. Determine that other, less restrictive measures have been tried or would not be effective before applying the restraint  2. Evaluate the patient's condition at the time of restraint application  3. Inform patient/family regarding the reason for restraint  4. Q2H: Monitor safety, psychosocial status, comfort, nutrition and hydration  Outcome: PROGRESSING AS EXPECTED

## 2020-09-23 LAB
ANION GAP SERPL CALC-SCNC: 15 MMOL/L (ref 7–16)
BASOPHILS # BLD AUTO: 0.8 % (ref 0–1.8)
BASOPHILS # BLD: 0.07 K/UL (ref 0–0.12)
BUN SERPL-MCNC: 22 MG/DL (ref 8–22)
CALCIUM SERPL-MCNC: 9.7 MG/DL (ref 8.5–10.5)
CHLORIDE SERPL-SCNC: 90 MMOL/L (ref 96–112)
CO2 SERPL-SCNC: 25 MMOL/L (ref 20–33)
COLLECT DURATION TIME SPEC: 24 HRS
CREAT 24H UR-MCNC: 64 MG/DL
CREAT 24H UR-MRATE: 832 MG/D (ref 800–2100)
CREAT SERPL-MCNC: 0.46 MG/DL (ref 0.5–1.4)
EOSINOPHIL # BLD AUTO: 0.19 K/UL (ref 0–0.51)
EOSINOPHIL NFR BLD: 2.3 % (ref 0–6.9)
ERYTHROCYTE [DISTWIDTH] IN BLOOD BY AUTOMATED COUNT: 49.5 FL (ref 35.9–50)
GLUCOSE SERPL-MCNC: 142 MG/DL (ref 65–99)
HCT VFR BLD AUTO: 36.6 % (ref 42–52)
HGB BLD-MCNC: 12.4 G/DL (ref 14–18)
IMM GRANULOCYTES # BLD AUTO: 0.04 K/UL (ref 0–0.11)
IMM GRANULOCYTES NFR BLD AUTO: 0.5 % (ref 0–0.9)
LYMPHOCYTES # BLD AUTO: 1.14 K/UL (ref 1–4.8)
LYMPHOCYTES NFR BLD: 13.7 % (ref 22–41)
MAGNESIUM SERPL-MCNC: 1.4 MG/DL (ref 1.5–2.5)
MCH RBC QN AUTO: 35.1 PG (ref 27–33)
MCHC RBC AUTO-ENTMCNC: 33.9 G/DL (ref 33.7–35.3)
MCV RBC AUTO: 103.7 FL (ref 81.4–97.8)
MONOCYTES # BLD AUTO: 0.68 K/UL (ref 0–0.85)
MONOCYTES NFR BLD AUTO: 8.2 % (ref 0–13.4)
NEUTROPHILS # BLD AUTO: 6.22 K/UL (ref 1.82–7.42)
NEUTROPHILS NFR BLD: 74.5 % (ref 44–72)
NRBC # BLD AUTO: 0 K/UL
NRBC BLD-RTO: 0 /100 WBC
PHOSPHATE 24H UR-MCNC: 79 MG/DL
PHOSPHATE 24H UR-MRATE: 1027 MG/D (ref 400–1300)
PHOSPHATE/CREAT 24H UR: 1234 MG/G
PLATELET # BLD AUTO: 599 K/UL (ref 164–446)
PMV BLD AUTO: 10 FL (ref 9–12.9)
POTASSIUM SERPL-SCNC: 3.7 MMOL/L (ref 3.6–5.5)
RBC # BLD AUTO: 3.53 M/UL (ref 4.7–6.1)
SODIUM SERPL-SCNC: 130 MMOL/L (ref 135–145)
TOTAL VOLUME 1105: NORMAL ML
WBC # BLD AUTO: 8.3 K/UL (ref 4.8–10.8)

## 2020-09-23 PROCEDURE — 85025 COMPLETE CBC W/AUTO DIFF WBC: CPT

## 2020-09-23 PROCEDURE — 700102 HCHG RX REV CODE 250 W/ 637 OVERRIDE(OP): Performed by: INTERNAL MEDICINE

## 2020-09-23 PROCEDURE — 700102 HCHG RX REV CODE 250 W/ 637 OVERRIDE(OP): Performed by: HOSPITALIST

## 2020-09-23 PROCEDURE — A9270 NON-COVERED ITEM OR SERVICE: HCPCS | Performed by: INTERNAL MEDICINE

## 2020-09-23 PROCEDURE — 97535 SELF CARE MNGMENT TRAINING: CPT | Mod: CO

## 2020-09-23 PROCEDURE — 80048 BASIC METABOLIC PNL TOTAL CA: CPT

## 2020-09-23 PROCEDURE — 99232 SBSQ HOSP IP/OBS MODERATE 35: CPT | Performed by: HOSPITALIST

## 2020-09-23 PROCEDURE — 83735 ASSAY OF MAGNESIUM: CPT

## 2020-09-23 PROCEDURE — 36415 COLL VENOUS BLD VENIPUNCTURE: CPT

## 2020-09-23 PROCEDURE — 92523 SPEECH SOUND LANG COMPREHEN: CPT

## 2020-09-23 PROCEDURE — 700101 HCHG RX REV CODE 250: Performed by: HOSPITALIST

## 2020-09-23 PROCEDURE — A9270 NON-COVERED ITEM OR SERVICE: HCPCS | Performed by: HOSPITALIST

## 2020-09-23 PROCEDURE — 770020 HCHG ROOM/CARE - TELE (206)

## 2020-09-23 PROCEDURE — 700111 HCHG RX REV CODE 636 W/ 250 OVERRIDE (IP): Performed by: INTERNAL MEDICINE

## 2020-09-23 PROCEDURE — 97112 NEUROMUSCULAR REEDUCATION: CPT | Mod: CO

## 2020-09-23 RX ORDER — MAGNESIUM SULFATE HEPTAHYDRATE 40 MG/ML
2 INJECTION, SOLUTION INTRAVENOUS ONCE
Status: COMPLETED | OUTPATIENT
Start: 2020-09-23 | End: 2020-09-23

## 2020-09-23 RX ORDER — AMLODIPINE BESYLATE 10 MG/1
10 TABLET ORAL
Status: DISCONTINUED | OUTPATIENT
Start: 2020-09-23 | End: 2020-09-25 | Stop reason: HOSPADM

## 2020-09-23 RX ORDER — POTASSIUM CHLORIDE 20 MEQ/1
20 TABLET, EXTENDED RELEASE ORAL ONCE
Status: COMPLETED | OUTPATIENT
Start: 2020-09-23 | End: 2020-09-23

## 2020-09-23 RX ORDER — SOTALOL HYDROCHLORIDE 80 MG/1
80 TABLET ORAL EVERY EVENING
Status: DISCONTINUED | OUTPATIENT
Start: 2020-09-23 | End: 2020-09-23

## 2020-09-23 RX ORDER — FERROUS SULFATE 325(65) MG
325 TABLET ORAL
Status: DISCONTINUED | OUTPATIENT
Start: 2020-09-25 | End: 2020-09-25 | Stop reason: HOSPADM

## 2020-09-23 RX ORDER — POTASSIUM CHLORIDE 20 MEQ/1
40 TABLET, EXTENDED RELEASE ORAL DAILY
Status: DISCONTINUED | OUTPATIENT
Start: 2020-09-24 | End: 2020-09-24

## 2020-09-23 RX ORDER — SOTALOL HYDROCHLORIDE 80 MG/1
80 TABLET ORAL TWICE DAILY
Status: DISCONTINUED | OUTPATIENT
Start: 2020-09-23 | End: 2020-09-25 | Stop reason: HOSPADM

## 2020-09-23 RX ADMIN — ACETAMINOPHEN 500 MG: 500 TABLET ORAL at 12:54

## 2020-09-23 RX ADMIN — APIXABAN 2.5 MG: 5 TABLET, FILM COATED ORAL at 20:04

## 2020-09-23 RX ADMIN — MAGNESIUM SULFATE 2 G: 2 INJECTION INTRAVENOUS at 13:36

## 2020-09-23 RX ADMIN — SIMVASTATIN 20 MG: 20 TABLET, FILM COATED ORAL at 18:49

## 2020-09-23 RX ADMIN — Medication 100 MG: at 04:20

## 2020-09-23 RX ADMIN — ACETAMINOPHEN 500 MG: 500 TABLET ORAL at 18:49

## 2020-09-23 RX ADMIN — MONTELUKAST 10 MG: 10 TABLET, FILM COATED ORAL at 04:20

## 2020-09-23 RX ADMIN — FERROUS SULFATE TAB 325 MG (65 MG ELEMENTAL FE) 325 MG: 325 (65 FE) TAB at 09:37

## 2020-09-23 RX ADMIN — SOTALOL HYDROCHLORIDE 80 MG: 80 TABLET ORAL at 04:20

## 2020-09-23 RX ADMIN — AMLODIPINE BESYLATE 10 MG: 10 TABLET ORAL at 20:04

## 2020-09-23 RX ADMIN — ACETAMINOPHEN 500 MG: 500 TABLET ORAL at 20:04

## 2020-09-23 RX ADMIN — LIDOCAINE 1 PATCH: 50 PATCH TOPICAL at 12:57

## 2020-09-23 RX ADMIN — ACETAMINOPHEN 500 MG: 500 TABLET ORAL at 09:37

## 2020-09-23 RX ADMIN — APIXABAN 5 MG: 5 TABLET, FILM COATED ORAL at 04:20

## 2020-09-23 RX ADMIN — POTASSIUM CHLORIDE 10 MEQ: 1500 TABLET, EXTENDED RELEASE ORAL at 04:20

## 2020-09-23 RX ADMIN — SOTALOL HYDROCHLORIDE 80 MG: 80 TABLET ORAL at 18:49

## 2020-09-23 RX ADMIN — POTASSIUM CHLORIDE 20 MEQ: 1500 TABLET, EXTENDED RELEASE ORAL at 12:54

## 2020-09-23 ASSESSMENT — ENCOUNTER SYMPTOMS
ABDOMINAL PAIN: 0
DIARRHEA: 0
NAUSEA: 0
SHORTNESS OF BREATH: 0
COUGH: 0
FEVER: 0
CHILLS: 0
EYE DISCHARGE: 0
INSOMNIA: 0
FOCAL WEAKNESS: 0
HEADACHES: 0
EYE PAIN: 0
MYALGIAS: 0
NERVOUS/ANXIOUS: 0
BACK PAIN: 0
VOMITING: 0
POLYDIPSIA: 0

## 2020-09-23 ASSESSMENT — COGNITIVE AND FUNCTIONAL STATUS - GENERAL
DAILY ACTIVITIY SCORE: 13
PERSONAL GROOMING: A LOT
TOILETING: A LOT
HELP NEEDED FOR BATHING: A LOT
EATING MEALS: A LOT
SUGGESTED CMS G CODE MODIFIER DAILY ACTIVITY: CL
DRESSING REGULAR LOWER BODY CLOTHING: A LOT
DRESSING REGULAR UPPER BODY CLOTHING: A LITTLE

## 2020-09-23 ASSESSMENT — PAIN DESCRIPTION - PAIN TYPE
TYPE: ACUTE PAIN

## 2020-09-23 NOTE — THERAPY
Speech Language Pathology   Initial Assessment     Patient Name: Jordan Alexander  AGE:  75 y.o., SEX:  male  Medical Record #: [id number]  Today's Date: 9/23/2020     Precautions  Precautions: (P) Fall Risk, Swallow Precautions ( See Comments)  Comments: (P) GLF    Assessment    Patient is 75 y.o. male with a diagnosis of encephalopathy. See H and P in EMR for pt's medical hist and information. MMSE at 15/30 and parts of non-standard cognitive linguistic eval demonstrating severe cognitive linguistic deficits. Pt is demonstrating improvement this week in sustained attention, orientation, and ability to follow directives when compared to last week. Pt with fluctuating orientation with pt stating month and name of hospital accurately and a short time later with confabulatory speech regarding his situation. Pt reading 1/4 inch bold print accurately at the phrase to short sentence level. Min cues for oral reading at the 2-3 sentence level. Pt's writing not legible for his name in cursive. Pt with legible numbers when drawing a clock but with disorganization and writing numbers 1-13 with approx 1/3 of the left upper corner of the clock absent of numbers.  Additional factors influencing patient status/progress: Pt is improving when compared to last week and he attempts all tasks.     Plan    Recommend Speech Therapy daily until therapy goals are met for the following treatments:  Cognitive-Linguistic Training.    Discharge Recommendations: (P) Recommend post-acute placement for additional speech therapy services prior to discharge home       09/23/20 0953   Verbal Expression   Verbal Expression / Aphasia Eval (WDL) X   Verbal Output Conversation Severe (2)   Confabulations Severe (2)   Skilled Intervention Verbal Cueing   Auditory Comprehension   Auditory Comprehension (WDL) X   Yes / No Questions: Personal Information Moderate (3)   Yes / No Questions: General Information Severe (2)   Follows Three Unit Commands Minimal (4)    Understands Simple, Structured Conversation  Severe (2)   Skilled Intervention Verbal Cueing;Compensatory Strategies   Reading Comprehension   Reading Comprehension (WDL) X   Reading Phrases Supervision (5)   Reading Sentences Minimal (4)   Barriers to Reading Vision / Visual Processing   Skilled Intervention Verbal Cueing   Written Expression   Written Expression (WDL) X   Functional Writing: Name Severe (2)   Skilled Intervention Verbal Cueing   Cognitive-Linguistic   Cognitive-Linguistic (WDL) X   Level of Consciousness Alert   Orientation Level Not Oriented to Year;Not Oriented to Month   Sustained Attention Minimal (4)   Short Term Memory Severe (2)   Safety Awareness Severe (2)   Insight into Deficits Severe (2)   Clock Drawing Poor Planning;Disorganization;Other (See Comments)  (Pt wrote numbers 1-13)   Session Information   Priority 3  (3x,metabolic enceph,PU/MT2,cog evaldone-severe deficits)

## 2020-09-23 NOTE — PROGRESS NOTES
1950: On call hospitalist paged regarding eliquis adminstration and positive occult blood. Per Dr Malave ok to give scheduled eliquis since H&H are stable. Will continue to monitor.

## 2020-09-23 NOTE — PROGRESS NOTES
Monitor summary: SR, 66-80 (touched down to 48), TX 0.24, QRS 0.10, QT 0.40, with rare/occassional PVCs/PACs and rare 1.5 sp per strip from monitor room.

## 2020-09-23 NOTE — CARE PLAN
Problem: Psychosocial Needs:  Goal: Level of anxiety will decrease  Outcome: PROGRESSING SLOWER THAN EXPECTED     Problem: Communication  Goal: The ability to communicate needs accurately and effectively will improve  Outcome: PROGRESSING SLOWER THAN EXPECTED   Patient encouraged to use call light to make needs known.

## 2020-09-23 NOTE — CARE PLAN
Problem: Safety  Goal: Will remain free from injury  Outcome: PROGRESSING AS EXPECTED  Note: Bed low and locked, call light and belongings within reach, bed and chair alarm in use, clutter free environment  Goal: Will remain free from falls  Outcome: PROGRESSING AS EXPECTED     Problem: Skin Integrity  Goal: Risk for impaired skin integrity will decrease  Outcome: PROGRESSING AS EXPECTED  Note: Frequent turns in place, waffle mattress and seat cushion in use, mobilizing pt to chair for meals, barrier cream in use as needed     Problem: Safety - Medical Restraint  Goal: Free from restraint(s) (Restraint for Interference with Medical Device)  Description: INTERVENTIONS:  1. ONCE/SHIFT or MINIMUM Q12H: Assess and document the continuing need for restraints  2. Q24H: Continued use of restraint requires LIP to perform face to face examination and written order  3. Identify and implement measures to help patient regain control  Note: Pt pulling at lines/equipment, educating about need for restraints and that they can be discontinued when pt no longer displays unsafe behavior, pt requires reinforcement, notified Dr. Langford, restraint order renewed

## 2020-09-23 NOTE — PROGRESS NOTES
[facility] Nephrology Daily Progress Note    Date of Service  9/23/2020    HISTORY OF PRESENT ILLNESS:    Per report as patient is not able to give history, patient is a 75 y.o. male who presented 9/12/2020 with altered mental status.  Apparently the patient was transferred for evaluation of altered mental status a few days ago progressively got worse was associated with some right-sided weakness that started in the morning.  Also reported to have a fall a week prior to presentation.  The patient was transferred as a code stroke he was evaluated by neurology and felt that his symptoms are likely related to hypertensive and possibly metabolic encephalopathy.  Etiology of encephalopathy was unclear thought maybe metabolic ?hyponatremia as cause.  EEG with no seizures, no evidence of infection.  Imaging with no evidence of acute infarct.     Nephrology consulted for hyponatremia.       Patient seen at bedside was A[facility]     Daily Nephrology Summary  9/18 - Consult done  9/19 - Patient was started on NS at 100cc/hr last night.  Sodium fluctuating between 120 and 124.  Complains of joint pain and abdominal pain.  9/20 - Not alert to place.  Still with slight abdominal pain.  Denies nausea.  No SOB.  9/21 - alert and oriented x 3 this AM, answers questions appropriately  9/22 - alert and oriented x 3, Na and K improved  9/23 - alert, oriented, Mg low at 1.4        Review of Systems  Review of Systems   Constitutional: Negative for chills and fever.   HENT: Negative for congestion, ear pain and nosebleeds.    Eyes: Negative for pain and discharge.   Respiratory: Negative for cough and shortness of breath.    Cardiovascular: Negative for chest pain and leg swelling.   Gastrointestinal: Negative for abdominal pain, diarrhea, nausea and vomiting.   Genitourinary: Negative for dysuria, frequency, hematuria and urgency.   Musculoskeletal: Negative for back pain and myalgias.    Neurological: Negative for focal weakness and headaches.   Endo/Heme/Allergies: Negative for environmental allergies and polydipsia.   Psychiatric/Behavioral: The patient is not nervous/anxious and does not have insomnia.         Physical Exam  Temp:  [36.3 °C (97.3 °F)-36.6 °C (97.8 °F)] 36.3 °C (97.3 °F)  Pulse:  [61-77] 61  Resp:  [16-18] 16  BP: (138-168)/(66-93) 146/66  SpO2:  [94 %-96 %] 94 %    Physical Exam  Constitutional:       General: He is not in acute distress.  HENT:      Head: Normocephalic. No abrasion.      Right Ear: External ear normal.      Left Ear: External ear normal.      Nose: Nose normal.      Mouth/Throat:      Mouth: Mucous membranes are dry.      Pharynx: Oropharynx is clear.   Eyes:      General: No scleral icterus.        Right eye: No discharge.         Left eye: No discharge.      Extraocular Movements: Extraocular movements intact.   Neck:      Musculoskeletal: Neck supple. No neck rigidity or muscular tenderness.   Cardiovascular:      Rate and Rhythm: Normal rate.   Pulmonary:      Effort: Pulmonary effort is normal.      Breath sounds: Normal breath sounds.   Abdominal:      General: Abdomen is flat.      Palpations: Abdomen is soft.   Musculoskeletal:      Right lower leg: No edema.      Left lower leg: No edema.   Skin:     General: Skin is warm and dry.   Neurological:      General: No focal deficit present.      Mental Status: He is alert and oriented to person, place, and time.   Psychiatric:         Mood and Affect: Mood normal.         Behavior: Behavior normal.         Fluids    Intake/Output Summary (Last 24 hours) at 9/23/2020 1254  Last data filed at 9/23/2020 1246  Gross per 24 hour   Intake 890 ml   Output 200 ml   Net 690 ml       Laboratory  Recent Labs     09/22/20  1011 09/23/20  0949   WBC 9.3 8.3   RBC 3.45* 3.53*   HEMOGLOBIN 12.4* 12.4*   HEMATOCRIT 36.3* 36.6*   .2* 103.7*   MCH 35.9* 35.1*   MCHC 34.2 33.9   RDW 50.7* 49.5   PLATELETCT 614* 599*    MPV 10.7 10.0     Recent Labs     09/22/20  1011 09/22/20  2058 09/23/20  0949   SODIUM 131* 128* 130*   POTASSIUM 4.2 4.2 3.7   CHLORIDE 91* 91* 90*   CO2 26 25 25   GLUCOSE 111* 101* 142*   BUN 18 27* 22   CREATININE 0.57 0.69 0.46*   CALCIUM 9.8 9.4 9.7         No results for input(s): NTPROBNP in the last 72 hours.        Imaging  reviewed     Assessment/Plan  IMPRESSION:  - Hyponatremia    * Etiology mixed, urine lytes/osm collected after patient had been provided IVF, unclear reliability.     * TSH wnl, protein wnl; on leupron, but no documented h/o recent prostate procedure; prealbumin low, K low, no uric acid noted, BUN wnl/near normal; CO2 wnl, hypokalemia present since admission    * Patient is euvolemic on exam with low serum osm    * Na initially improved with IVF, then decreased again, has been labile; he has also received lasix; recently started on salt tabs, subsequently DC      - Proteinuria    * Given slightly elevated urine protein and anemia check SPEP/ UPEP MATTIE--pending    * Small blood in urine likely from enrique, don't suspect GN    * Can repeat UA when enrique is removed    - Acute Encephalopathy    * Unclear etiology, though to be associated with hyponatremia and HTN    * Continue management per primary team and neurology    - Anemia    * Anemia of chronic disease    * Ferritin 550, %sat 9    - Hypokalemia    * present since admission, may have worsened with diuretics use    * Hold diuretics     * Keep K >4    - Hypomagnesemia    * Resolved    * Replete PRN    * Keep Mg >2    - Paroxysmal Atrial Fibrillation    * management per primary team, on sotalol and metoprolol    - HTN    * goal <140/90     - Dyslipidemia    - Back Pain--none today    - hyperglycemia     PLAN:    - Continue to hold NS and salt tabs for now  - KCl and Mg supplementation ordered, keep K>4, Mg>2  - Strict I/O  - High protein diet, high potassium diet  - Avoid nephrotoxic agents  - Follow up SPEP/UPEP, light chains  -  Continue amlodipine 5mg qhs  - would taper/DC metoprolol if able, defer to primary service  - check metabolic panel qam--no need to continue BID checks    PMH/PSH/FH/SH/meds/labs reviewed      Other management per primary service

## 2020-09-23 NOTE — THERAPY
Occupational Therapy  Daily Treatment     Patient Name: Joradn Alexander  Age:  75 y.o., Sex:  male  Medical Record #: [id number]  Today's Date: 9/23/2020       Precautions: Fall Risk, Swallow Precautions ( See Comments)      Assessment    Pt seen for OT tx. Continues to be limited by decreased activity tolerance, inattention, balance deficits and impaired sequencing impacting ability to complete ADLs and ADL transfers independently. Pt easily distracted during session and required cues to attend to task to complete thoroughly. Amb w/ FWW in room w/ min A for balance and safety. Continue to recommend placement prior to d/c home.     Plan    Continue current treatment plan.    DC Equipment Recommendations: Unable to determine at this time  Discharge Recommendations: Recommend post-acute placement for additional occupational therapy services prior to discharge home       09/23/20 0910   Cognition    Cognition / Consciousness X   Safety Awareness Impaired   Attention Impaired   Bed Mobility    Supine to Sit Minimal Assist   Sit to Supine   (left up in chair )   Activities of Daily Living   Grooming Minimal Assist;Standing   Upper Body Dressing Minimal Assist   Lower Body Dressing Moderate Assist   Toileting Maximal Assist   Comments sequencing for ADLs   Functional Mobility   Sit to Stand Minimal Assist   Bed, Chair, Wheelchair Transfer Minimal Assist   Short Term Goals   Short Term Goal # 1 Pt will groom seated with min assist    Goal Outcome # 1 Progressing as expected   Short Term Goal # 2 Pt will complete ADL transfers with min assist    Goal Outcome # 2 Progressing as expected   Short Term Goal # 3 Pt will dress LB with min assist    Goal Outcome # 3 Progressing as expected   Anticipated Discharge Equipment and Recommendations   DC Equipment Recommendations Unable to determine at this time   Discharge Recommendations Recommend post-acute placement for additional occupational therapy services prior to discharge home

## 2020-09-23 NOTE — PROGRESS NOTES
Monitor summary: SR 64-80, GA 0.20, QRS 0.80, QT 0.44, with rare PVCs, PACs, couplets, and trigeminy per strip from monitor room.

## 2020-09-23 NOTE — PROGRESS NOTES
"Hospital Medicine Daily Progress Note    Date of Service  9/23/2020    Chief Complaint  75 y.o. male admitted 9/12/2020 with altered mental status.     Hospital Course    75 y.o. male who presented 9/12/2020 with altered mental status.  Apparently the patient was transferred for evaluation of altered mental status that started this morning progressively got worse was associated with some right-sided weakness that started around 11:05 a.m..  The patient was transferred as a code stroke he was evaluated by neurology and felt that his symptoms are likely related to hypertensive and possibly metabolic encephalopathy.      Interval Problem Update  Tolerating oral intake.    Alert but confused still  Admits to 2-3 whiskey drinks per night for \"a while\"  Continued restraints for pulling at lines.  Replacing Mg      Consultants/Specialty  Neurology  Nephrology    Code Status  Full Code    Disposition  CM and wife working on choice for SNF placement    Review of Systems  Review of Systems   Unable to perform ROS: Mental status change        Physical Exam  Temp:  [36.3 °C (97.3 °F)-36.6 °C (97.8 °F)] 36.3 °C (97.3 °F)  Pulse:  [61-77] 61  Resp:  [16-18] 16  BP: (138-168)/(66-93) 146/66  SpO2:  [94 %-96 %] 94 %    Physical Exam  Vitals signs reviewed.   Constitutional:       General: He is not in acute distress.     Appearance: He is ill-appearing.   HENT:      Head: Normocephalic and atraumatic.      Nose: No congestion.      Mouth/Throat:      Mouth: Mucous membranes are moist.   Eyes:      Extraocular Movements: Extraocular movements intact.      Pupils: Pupils are equal, round, and reactive to light.   Neck:      Musculoskeletal: Neck supple.   Cardiovascular:      Rate and Rhythm: Normal rate and regular rhythm.      Pulses: Normal pulses.      Heart sounds: Normal heart sounds.   Pulmonary:      Effort: Pulmonary effort is normal. No respiratory distress.      Breath sounds: Normal breath sounds. No wheezing. "   Abdominal:      General: Bowel sounds are normal. There is no distension.      Palpations: Abdomen is soft.      Tenderness: There is no abdominal tenderness.   Musculoskeletal:         General: No swelling.   Skin:     General: Skin is warm and dry.      Capillary Refill: Capillary refill takes less than 2 seconds.   Neurological:      Mental Status: He is alert and oriented to person, place, and time. He is confused.      Motor: Motor function is intact.   Psychiatric:         Attention and Perception: Attention normal. He perceives visual hallucinations.         Mood and Affect: Affect is inappropriate.         Speech: Speech is delayed and tangential.         Behavior: Behavior is uncooperative and withdrawn. Behavior is not agitated or aggressive.         Fluids    Intake/Output Summary (Last 24 hours) at 9/23/2020 1430  Last data filed at 9/23/2020 1300  Gross per 24 hour   Intake 890 ml   Output 800 ml   Net 90 ml       Laboratory  Recent Labs     09/22/20  1011 09/23/20  0949   WBC 9.3 8.3   RBC 3.45* 3.53*   HEMOGLOBIN 12.4* 12.4*   HEMATOCRIT 36.3* 36.6*   .2* 103.7*   MCH 35.9* 35.1*   MCHC 34.2 33.9   RDW 50.7* 49.5   PLATELETCT 614* 599*   MPV 10.7 10.0     Recent Labs     09/22/20  1011 09/22/20 2058 09/23/20  0949   SODIUM 131* 128* 130*   POTASSIUM 4.2 4.2 3.7   CHLORIDE 91* 91* 90*   CO2 26 25 25   GLUCOSE 111* 101* 142*   BUN 18 27* 22   CREATININE 0.57 0.69 0.46*   CALCIUM 9.8 9.4 9.7                   Imaging  No new imaging.    Assessment/Plan  * Acute encephalopathy- (present on admission)  Assessment & Plan  Improving.    Etiology is not entirely clear patient evaluated by neurology not felt to be a TPA candidate.  Possible hypertensive encephalopathy and some component of metabolic encephalopathy.  CT head and CTA head and neck reviewed.  No evidence of acute infection, procal wnl.  MRI of brain with chronic but no acute abnormalities.   EEG with encephalopathy, no seizure  captured.  Not polypharmacy given med list.  Cont with Prn BP medications.  Continue pt/ot/speech therapies  Most likely etiology is hyponatremia.  We will continue to treat this underlying cause as noted.    Dysphasia- (present on admission)  Assessment & Plan  Patient has been unable to swallow safely, per speech/swallow eval.  We have been unable to maintain oral access via feeding tube.  On 9/20, mentation improved and patient passed swallow eval, which should help his management significantly.  Continue oral medications.    Hypokalemia- (present on admission)  Assessment & Plan  Improves with supplementation.  Replete and monitor closely.    Hyponatremia- (present on admission)  Assessment & Plan  Improving.  Most likely cause of his acute encephalopathy.  Low serum osm, likely hypovolemic on presentation, but did not improve with NS. No new urine osm collected, was high on admission.  High urine sodium may suggest SIADH, but this is a diagnosis of exclusion and there is no clear provoking factor for SIADH.  Free water flushes are being held and free water components of IV medications minimized.  On 9/16, physical exam was difficult due to patient's mental status, but elevated BNP suggested hypervolemia.  No improvement with furosemide.  Continue to closely monitor scheduled labs q6h.  Nephrology consulted, appreciate recommendations.     Macrocytic anemia- (present on admission)  Assessment & Plan  Multifactorial with iron deficiency and anemia of chronic disease.  Normal TSH, B12.  Start oral iron replacement.    Hypomagnesemia- (present on admission)  Assessment & Plan  Replete and monitor closely.     Paroxysmal atrial fibrillation (HCC)- (present on admission)  Assessment & Plan  Continue Eliquis and sotalol. Sotalol being held for hypokalemia.    Dyslipidemia- (present on admission)  Assessment & Plan  Continue simvastatin    Back pain- (present on admission)  Assessment & Plan  unremarkbale on xray      Suspect low Na and chronic etoh abuse   SLP for cog eval  neph following  Replace Mg    VTE prophylaxis: eliquis

## 2020-09-24 LAB
ANION GAP SERPL CALC-SCNC: 13 MMOL/L (ref 7–16)
BUN SERPL-MCNC: 20 MG/DL (ref 8–22)
CALCIUM SERPL-MCNC: 9.9 MG/DL (ref 8.5–10.5)
CHLORIDE SERPL-SCNC: 87 MMOL/L (ref 96–112)
CO2 SERPL-SCNC: 27 MMOL/L (ref 20–33)
CREAT SERPL-MCNC: 0.59 MG/DL (ref 0.5–1.4)
ERYTHROCYTE [DISTWIDTH] IN BLOOD BY AUTOMATED COUNT: 48.9 FL (ref 35.9–50)
GLUCOSE SERPL-MCNC: 116 MG/DL (ref 65–99)
HCT VFR BLD AUTO: 37 % (ref 42–52)
HGB BLD-MCNC: 12.7 G/DL (ref 14–18)
MAGNESIUM SERPL-MCNC: 1.7 MG/DL (ref 1.5–2.5)
MCH RBC QN AUTO: 35.7 PG (ref 27–33)
MCHC RBC AUTO-ENTMCNC: 34.3 G/DL (ref 33.7–35.3)
MCV RBC AUTO: 103.9 FL (ref 81.4–97.8)
OSMOLALITY SERPL: 275 MOSM/KG H2O (ref 278–298)
OSMOLALITY UR: 623 MOSM/KG H2O (ref 300–900)
PLATELET # BLD AUTO: 641 K/UL (ref 164–446)
PMV BLD AUTO: 9.7 FL (ref 9–12.9)
POTASSIUM SERPL-SCNC: 4.5 MMOL/L (ref 3.6–5.5)
RBC # BLD AUTO: 3.56 M/UL (ref 4.7–6.1)
SODIUM SERPL-SCNC: 127 MMOL/L (ref 135–145)
SODIUM UR-SCNC: 101 MMOL/L
URATE SERPL-MCNC: 3.6 MG/DL (ref 2.5–8.3)
WBC # BLD AUTO: 9.8 K/UL (ref 4.8–10.8)

## 2020-09-24 PROCEDURE — 700102 HCHG RX REV CODE 250 W/ 637 OVERRIDE(OP): Performed by: INTERNAL MEDICINE

## 2020-09-24 PROCEDURE — 80048 BASIC METABOLIC PNL TOTAL CA: CPT

## 2020-09-24 PROCEDURE — 85027 COMPLETE CBC AUTOMATED: CPT

## 2020-09-24 PROCEDURE — A9270 NON-COVERED ITEM OR SERVICE: HCPCS | Performed by: INTERNAL MEDICINE

## 2020-09-24 PROCEDURE — 700102 HCHG RX REV CODE 250 W/ 637 OVERRIDE(OP): Performed by: HOSPITALIST

## 2020-09-24 PROCEDURE — A9270 NON-COVERED ITEM OR SERVICE: HCPCS | Performed by: HOSPITALIST

## 2020-09-24 PROCEDURE — 36415 COLL VENOUS BLD VENIPUNCTURE: CPT

## 2020-09-24 PROCEDURE — 770006 HCHG ROOM/CARE - MED/SURG/GYN SEMI*

## 2020-09-24 PROCEDURE — 83935 ASSAY OF URINE OSMOLALITY: CPT

## 2020-09-24 PROCEDURE — 83520 IMMUNOASSAY QUANT NOS NONAB: CPT

## 2020-09-24 PROCEDURE — 84300 ASSAY OF URINE SODIUM: CPT

## 2020-09-24 PROCEDURE — 99232 SBSQ HOSP IP/OBS MODERATE 35: CPT | Performed by: HOSPITALIST

## 2020-09-24 PROCEDURE — 770020 HCHG ROOM/CARE - TELE (206)

## 2020-09-24 PROCEDURE — 84550 ASSAY OF BLOOD/URIC ACID: CPT

## 2020-09-24 PROCEDURE — 83930 ASSAY OF BLOOD OSMOLALITY: CPT

## 2020-09-24 PROCEDURE — 83735 ASSAY OF MAGNESIUM: CPT

## 2020-09-24 RX ORDER — POTASSIUM CHLORIDE 20 MEQ/1
20 TABLET, EXTENDED RELEASE ORAL DAILY
Status: DISCONTINUED | OUTPATIENT
Start: 2020-09-25 | End: 2020-09-25

## 2020-09-24 RX ORDER — HYDRALAZINE HYDROCHLORIDE 25 MG/1
25 TABLET, FILM COATED ORAL EVERY 8 HOURS
Status: DISCONTINUED | OUTPATIENT
Start: 2020-09-24 | End: 2020-09-25 | Stop reason: HOSPADM

## 2020-09-24 RX ADMIN — POTASSIUM CHLORIDE 40 MEQ: 1500 TABLET, EXTENDED RELEASE ORAL at 04:25

## 2020-09-24 RX ADMIN — MONTELUKAST 10 MG: 10 TABLET, FILM COATED ORAL at 04:24

## 2020-09-24 RX ADMIN — HYDRALAZINE HYDROCHLORIDE 25 MG: 25 TABLET, FILM COATED ORAL at 15:12

## 2020-09-24 RX ADMIN — APIXABAN 2.5 MG: 5 TABLET, FILM COATED ORAL at 18:11

## 2020-09-24 RX ADMIN — Medication 100 MG: at 04:25

## 2020-09-24 RX ADMIN — SOTALOL HYDROCHLORIDE 80 MG: 80 TABLET ORAL at 18:11

## 2020-09-24 RX ADMIN — SOTALOL HYDROCHLORIDE 80 MG: 80 TABLET ORAL at 04:25

## 2020-09-24 RX ADMIN — SIMVASTATIN 20 MG: 20 TABLET, FILM COATED ORAL at 18:11

## 2020-09-24 RX ADMIN — ACETAMINOPHEN 500 MG: 500 TABLET ORAL at 09:42

## 2020-09-24 RX ADMIN — HYDRALAZINE HYDROCHLORIDE 25 MG: 25 TABLET, FILM COATED ORAL at 21:18

## 2020-09-24 RX ADMIN — ACETAMINOPHEN 500 MG: 500 TABLET ORAL at 18:11

## 2020-09-24 RX ADMIN — APIXABAN 2.5 MG: 5 TABLET, FILM COATED ORAL at 04:25

## 2020-09-24 RX ADMIN — ACETAMINOPHEN 500 MG: 500 TABLET ORAL at 15:11

## 2020-09-24 RX ADMIN — DOCUSATE SODIUM 50 MG AND SENNOSIDES 8.6 MG 2 TABLET: 8.6; 5 TABLET, FILM COATED ORAL at 04:25

## 2020-09-24 RX ADMIN — AMLODIPINE BESYLATE 10 MG: 10 TABLET ORAL at 21:17

## 2020-09-24 RX ADMIN — ACETAMINOPHEN 500 MG: 500 TABLET ORAL at 21:17

## 2020-09-24 ASSESSMENT — ENCOUNTER SYMPTOMS
HEADACHES: 0
MYALGIAS: 0
SENSORY CHANGE: 0
TREMORS: 0
NAUSEA: 0
COUGH: 0
FEVER: 0
POLYDIPSIA: 0
FOCAL WEAKNESS: 0
VOMITING: 0
BACK PAIN: 0
TINGLING: 0
HEARTBURN: 0
ABDOMINAL PAIN: 0
EYE DISCHARGE: 0
EYE PAIN: 0
NERVOUS/ANXIOUS: 0
CONSTIPATION: 1
DIARRHEA: 0
SHORTNESS OF BREATH: 0
CHILLS: 0
INSOMNIA: 0

## 2020-09-24 NOTE — PROGRESS NOTES
"Hospital Medicine Daily Progress Note    Date of Service  9/24/2020    Chief Complaint  75 y.o. male admitted 9/12/2020 with altered mental status.     Hospital Course    75 y.o. male who presented 9/12/2020 with altered mental status.  Apparently the patient was transferred for evaluation of altered mental status that started this morning progressively got worse was associated with some right-sided weakness that started around 11:05 a.m..  The patient was transferred as a code stroke he was evaluated by neurology and felt that his symptoms are likely related to hypertensive and possibly metabolic encephalopathy.      Interval Problem Update  Doing better  AOx3 this morning although thought at [employer]  Asking about wife and when he can see her    DC restraints, think tele can come off as well  Had large dark stool but has not reoccured, continued eliquis, Hb ok    Consultants/Specialty  Neurology  Nephrology    Code Status  Full Code    Disposition  CM and wife working on choice for SNF placement    Review of Systems  Review of Systems   Constitutional: Negative for chills and fever.   Cardiovascular: Negative for chest pain.   Gastrointestinal: Positive for constipation. Negative for heartburn, nausea and vomiting.   Skin: Negative for itching and rash.   Neurological: Negative for tingling, tremors and sensory change.   All other systems reviewed and are negative.       Physical Exam  Temp:  [36.3 °C (97.3 °F)-36.6 °C (97.9 °F)] 36.6 °C (97.9 °F)  Pulse:  [61-79] 74  Resp:  [16-18] 18  BP: (146-167)/(66-83) 151/78  SpO2:  [94 %-97 %] 95 %    Physical Exam  Vitals signs and nursing note reviewed.   Constitutional:       General: He is not in acute distress.     Appearance: He is not diaphoretic.   HENT:      Head: Normocephalic and atraumatic.      Nose: No congestion.      Mouth/Throat:      Mouth: Mucous membranes are moist.   Eyes:      General:         Right eye: No discharge.         Left eye: No " discharge.      Extraocular Movements: Extraocular movements intact.      Conjunctiva/sclera: Conjunctivae normal.   Neck:      Musculoskeletal: Normal range of motion. No neck rigidity.   Cardiovascular:      Rate and Rhythm: Normal rate.      Pulses: Normal pulses.   Pulmonary:      Effort: Pulmonary effort is normal. No respiratory distress.      Breath sounds: No wheezing.   Abdominal:      General: Abdomen is flat. There is no distension.      Palpations: Abdomen is soft.      Tenderness: There is no abdominal tenderness.   Musculoskeletal: Normal range of motion.   Skin:     General: Skin is warm and dry.      Coloration: Skin is not jaundiced.   Neurological:      Mental Status: He is alert and oriented to person, place, and time.     Improving    Fluids    Intake/Output Summary (Last 24 hours) at 9/24/2020 1026  Last data filed at 9/24/2020 0900  Gross per 24 hour   Intake 440 ml   Output 600 ml   Net -160 ml       Laboratory  Recent Labs     09/22/20  1011 09/23/20  0949 09/24/20  0739   WBC 9.3 8.3 9.8   RBC 3.45* 3.53* 3.56*   HEMOGLOBIN 12.4* 12.4* 12.7*   HEMATOCRIT 36.3* 36.6* 37.0*   .2* 103.7* 103.9*   MCH 35.9* 35.1* 35.7*   MCHC 34.2 33.9 34.3   RDW 50.7* 49.5 48.9   PLATELETCT 614* 599* 641*   MPV 10.7 10.0 9.7     Recent Labs     09/22/20  2058 09/23/20  0949 09/24/20  0739   SODIUM 128* 130* 127*   POTASSIUM 4.2 3.7 4.5   CHLORIDE 91* 90* 87*   CO2 25 25 27   GLUCOSE 101* 142* 116*   BUN 27* 22 20   CREATININE 0.69 0.46* 0.59   CALCIUM 9.4 9.7 9.9                   Imaging  No new imaging.    Assessment/Plan  * Acute encephalopathy- (present on admission)  Assessment & Plan  Improving.    Etiology is not entirely clear patient evaluated by neurology not felt to be a TPA candidate.  Possible hypertensive encephalopathy and some component of metabolic encephalopathy.  CT head and CTA head and neck reviewed.  No evidence of acute infection, procal wnl.  MRI of brain with chronic but no acute  abnormalities.   EEG with encephalopathy, no seizure captured.  Not polypharmacy given med list.  Continue pt/ot/speech therapies    Dysphasia- (present on admission)  Assessment & Plan  Patient has been unable to swallow safely, per speech/swallow eval.  We have been unable to maintain oral access via feeding tube.  On 9/20, mentation improved and patient passed swallow eval, which should help his management significantly.  Continue oral medications.    Hypokalemia- (present on admission)  Assessment & Plan  Improves with supplementation.  Replete and monitor closely.    Hyponatremia- (present on admission)  Assessment & Plan  Improving some, 127-130  Low serum osm, likely hypovolemic on presentation, but did not improve with NS. No new urine osm collected, was high on admission.  High urine sodium may suggest SIADH, but this is a diagnosis of exclusion and there is no clear provoking factor for SIADH.  Free water flushes are being held and free water components of IV medications minimized.    Macrocytic anemia- (present on admission)  Assessment & Plan  Multifactorial with iron deficiency and anemia of chronic disease.  Normal TSH, B12.  Start oral iron replacement.    Hypomagnesemia- (present on admission)  Assessment & Plan  Replete and monitor closely.     Paroxysmal atrial fibrillation (HCC)- (present on admission)  Assessment & Plan  Continue Eliquis and sotalol    Dyslipidemia- (present on admission)  Assessment & Plan  Continue simvastatin    Back pain- (present on admission)  Assessment & Plan  unremarkbale on xray     Suspect low Na and chronic etoh abuse   SLP for cog eval  neph following  Replace Mg    VTE prophylaxis: eliquis

## 2020-09-24 NOTE — PROGRESS NOTES
With patient’s permission, completed daily phone call to designated support person, name Erasmo.   Discussed patient condition and plan of care. All questions answered.    1500: Wife Erasmo at bedside, updated Erasmo on pt status, plan of care, medications, and diagnostics, all questions answered.      1800: Notified Dr. Langford of pt's dark bowel movement, was instructed to order daily morning CBC lab.

## 2020-09-24 NOTE — CARE PLAN
Problem: Communication  Goal: The ability to communicate needs accurately and effectively will improve  Outcome: PROGRESSING SLOWER THAN EXPECTED   Patient encouraged to use call light to make needs known.   Problem: Venous Thromboembolism (VTW)/Deep Vein Thrombosis (DVT) Prevention:  Goal: Patient will participate in Venous Thrombosis (VTE)/Deep Vein Thrombosis (DVT)Prevention Measures  Outcome: PROGRESSING SLOWER THAN EXPECTED   SCDs in place at times, patient continuously removing them

## 2020-09-24 NOTE — DISCHARGE PLANNING
[facility]     Referral from: Dr. Langford  ordered as outpatient 09/22   Facesheet indicates: Medicare   Potential Rehab Diagnosis: Encephalopathy     Chart review indicates patient has on going medical management and  therapy needs to possibly meet inpatient rehab facility criteria with the goal of returning to community.    D/C support: Spouse and children      Physiatry consultation per protocol.              Thank you for the referral.

## 2020-09-24 NOTE — PROGRESS NOTES
[facility] Nephrology Daily Progress Note    Date of Service  9/24/2020    HISTORY OF PRESENT ILLNESS:    Per report as patient is not able to give history, patient is a 75 y.o. male who presented 9/12/2020 with altered mental status.  Apparently the patient was transferred for evaluation of altered mental status a few days ago progressively got worse was associated with some right-sided weakness that started in the morning.  Also reported to have a fall a week prior to presentation.  The patient was transferred as a code stroke he was evaluated by neurology and felt that his symptoms are likely related to hypertensive and possibly metabolic encephalopathy.  Etiology of encephalopathy was unclear thought maybe metabolic ?hyponatremia as cause.  EEG with no seizures, no evidence of infection.  Imaging with no evidence of acute infarct.     Nephrology consulted for hyponatremia.       Patient seen at bedside was AOx3 per nurse [facility]     Daily Nephrology Summary  9/18 - Consult done  9/19 - Patient was started on NS at 100cc/hr last night.  Sodium fluctuating between 120 and 124.  Complains of joint pain and abdominal pain.  9/20 - Not alert to place.  Still with slight abdominal pain.  Denies nausea.  No SOB.  9/21 - alert and oriented x 3 this AM, answers questions appropriately  9/22 - alert and oriented x 3, Na and K improved  9/23 - alert, oriented, Mg low at 1.4  9/24 - poor PO intake, Na decreased, K and Mg wnl, uric acid 3.6, patient slightly confused        Review of Systems  Review of Systems   Constitutional: Negative for chills and fever.   HENT: Negative for congestion, ear pain and nosebleeds.    Eyes: Negative for pain and discharge.   Respiratory: Negative for cough and shortness of breath.    Cardiovascular: Negative for chest pain and leg swelling.   Gastrointestinal: Negative for abdominal pain, diarrhea, nausea and vomiting.   Genitourinary: Negative for dysuria,  frequency, hematuria and urgency.   Musculoskeletal: Negative for back pain and myalgias.   Skin: Negative for itching and rash.   Neurological: Negative for focal weakness and headaches.   Endo/Heme/Allergies: Negative for environmental allergies and polydipsia.   Psychiatric/Behavioral: The patient is not nervous/anxious and does not have insomnia.         Physical Exam  Temp:  [36.1 °C (96.9 °F)-36.6 °C (97.9 °F)] 36.1 °C (96.9 °F)  Pulse:  [68-79] 72  Resp:  [16-18] 18  BP: (151-167)/(74-83) 157/74  SpO2:  [92 %-97 %] 92 %    Physical Exam  Constitutional:       General: He is not in acute distress.  HENT:      Head: Normocephalic. No abrasion.      Right Ear: External ear normal.      Left Ear: External ear normal.      Nose: Nose normal.      Mouth/Throat:      Mouth: Mucous membranes are dry.      Pharynx: Oropharynx is clear.   Eyes:      General: No scleral icterus.        Right eye: No discharge.         Left eye: No discharge.      Extraocular Movements: Extraocular movements intact.   Neck:      Musculoskeletal: Neck supple. No neck rigidity or muscular tenderness.   Cardiovascular:      Rate and Rhythm: Normal rate.   Pulmonary:      Effort: Pulmonary effort is normal.      Breath sounds: Normal breath sounds.   Abdominal:      General: Abdomen is flat.      Palpations: Abdomen is soft.   Musculoskeletal:      Right lower leg: No edema.      Left lower leg: No edema.   Skin:     General: Skin is warm and dry.   Neurological:      General: No focal deficit present.      Mental Status: He is alert.      Comments: Oriented to self, year, and situation   Psychiatric:         Mood and Affect: Mood normal.         Behavior: Behavior normal.         Fluids    Intake/Output Summary (Last 24 hours) at 9/24/2020 1232  Last data filed at 9/24/2020 0900  Gross per 24 hour   Intake 440 ml   Output 600 ml   Net -160 ml       Laboratory  Recent Labs     09/22/20  1011 09/23/20  0949 09/24/20  0739   WBC 9.3 8.3 9.8    RBC 3.45* 3.53* 3.56*   HEMOGLOBIN 12.4* 12.4* 12.7*   HEMATOCRIT 36.3* 36.6* 37.0*   .2* 103.7* 103.9*   MCH 35.9* 35.1* 35.7*   MCHC 34.2 33.9 34.3   RDW 50.7* 49.5 48.9   PLATELETCT 614* 599* 641*   MPV 10.7 10.0 9.7     Recent Labs     09/22/20 2058 09/23/20  0949 09/24/20  0739   SODIUM 128* 130* 127*   POTASSIUM 4.2 3.7 4.5   CHLORIDE 91* 90* 87*   CO2 25 25 27   GLUCOSE 101* 142* 116*   BUN 27* 22 20   CREATININE 0.69 0.46* 0.59   CALCIUM 9.4 9.7 9.9         No results for input(s): NTPROBNP in the last 72 hours.        Imaging  reviewed     Assessment/Plan  IMPRESSION:  - Hyponatremia    * Etiology initially mixed, urine lytes/osm collected after patient had been provided IVF, unclear reliability.     * TSH wnl, protein wnl; on leupron, but no documented h/o recent prostate procedure; prealbumin low, K low, no uric acid noted, BUN wnl/near normal; CO2 wnl, hypokalemia present since admission    * Patient is euvolemic on exam with mildly reduced serum osm, no hypotension    * Na initially improved with IVF, then decreased again, has been labile; he has also received lasix; recently started on salt tabs, subsequently DC    Uric acid <4, repeat Abebe and Osm pending (off IVF and diuretics and salt tabs), ongoing poor PO intake      - Proteinuria    * Given slightly elevated urine protein and anemia check SPEP/ UPEP MATTIE--pending    * Small blood in urine likely from enrique, don't suspect GN    * Can repeat UA when enrique is removed    - Acute Encephalopathy    * Unclear etiology, though to be associated with hyponatremia and HTN    * Continue management per primary team and neurology    - Anemia    * Anemia of chronic disease    * Ferritin 550, %sat 9    - Hypokalemia    * present since admission, may have worsened with diuretics use    * Hold diuretics     * Keep K >4    - Hypomagnesemia    * Resolved    * Replete PRN    * Keep Mg >2    - Paroxysmal Atrial Fibrillation    * management per primary team, on  sotalol and metoprolol    - HTN    * goal <140/90     - Dyslipidemia    - Back Pain--none today    - hyperglycemia     PLAN:    - Continue to hold NS and salt tabs for now, if further findings c/w SIADH will resume salt tabs and consider low dose loop diuretic  - KCl and Mg supplementation ordered, keep K>4, Mg>2  - Strict I/O  - High protein diet, high potassium diet  - Avoid nephrotoxic agents  - Follow up SPEP with restriction of protein migration in   the gamma region.  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 FLC pending.  - Continue amlodipine 5mg qhs  - would taper/DC metoprolol if able, defer to primary service  - check metabolic panel qam  - continue amlodipine, start hydralazine 25mq [id number]    PMH/PSH/FH/SH/meds/labs reviewed      Other management per primary service

## 2020-09-24 NOTE — PROGRESS NOTES
Monitor Summary: SR rate 68-79, CT -.24, QRS -.18, QT -.42, with rare PVCs and rare PAC per strip from the monitor room.

## 2020-09-24 NOTE — PROGRESS NOTES
1500: Wife Erasmo at bedside, updated Erasmo on pt status, plan of care, medications, and diagnostics. All questions answered at this time.     1730: Sent message to Dr. Langford via tiger text to clarify if eliquis should still be given with recent positive occult blood in stool, Dr. Langford stated to still give but he would decrease dose and that he would watch pt's hemoglobin and hematocrit closely. No significant decrease noted in hemoglobin or hematocrit from yesterday to today.

## 2020-09-24 NOTE — PROGRESS NOTES
Monitor summary: sinus rhythm rate 70-79, OH .22, QRS .08, QT .42, with rare PVCs/PACs and rare couplets per strip from monitor room.

## 2020-09-24 NOTE — CONSULTS
Physical Medicine and Rehabilitation Consultation         Initial Consult      Initial Consultation Date: 9/24/2020  Consulting provider: Dr. Thomas Langford  Reason for consultation: assess for acute inpatient rehab appropriateness  LOS: 11 Day(s)      Chief complaint: AMS      HPI:   The patient is a 75 y.o. male with a past medical history of PAF, DL, anemia;  who presented on 9/12/2020 12:35 PM with AMS that was progressively worsening with right sided weakness. Code stroke was called. Per neurology, likely due to hypertension and possibly metabolic encephalopathy. Hospital course with hyponatremia/?SIADH/nephrology following, anemia, hypokalemia, thrombocytosis, and paroxysmal Afib on eliquis.       Social Hx:  Pre-morbidly, this patient lived with his wife        Current level of function:   PT, 9/22: Min A FWW x220 ft, Min A for mobility, transfers  OT, 9/23: Max A for toileting; Mod A for LBD; Min A for ADLs/transfers  SLP, 9/23: severe cog impairment   Swallow, 9/22: Mildly thick liquids, single consist diet          PMH:  No past medical history on file.      PSH:  No past surgical history on file.      FHX: Reviewed.  No family history on file.              Medications:  Current Facility-Administered Medications   Medication Dose   • [START ON 9/25/2020] potassium chloride SA (Kdur) tablet 20 mEq  20 mEq   • hydrALAZINE (APRESOLINE) tablet 25 mg  25 mg   • [START ON 9/25/2020] ferrous sulfate tablet 325 mg  325 mg   • amLODIPine (NORVASC) tablet 10 mg  10 mg   • sotalol (BETAPACE) tablet 80 mg  80 mg   • apixaban (ELIQUIS) tablet 2.5 mg  2.5 mg   • thiamine tablet 100 mg  100 mg   • acetaminophen (TYLENOL) tablet 500 mg  500 mg   • simvastatin (ZOCOR) tablet 20 mg  20 mg   • senna-docusate (PERICOLACE or SENOKOT S) 8.6-50 MG per tablet 2 Tab  2 Tab    And   • polyethylene glycol/lytes (MIRALAX) PACKET 1 Packet  1 Packet    And   • bisacodyl (DULCOLAX) suppository 10 mg  10 mg   • montelukast (SINGULAIR)  "tablet 10 mg  10 mg   • labetalol (NORMODYNE/TRANDATE) injection 10 mg  10 mg   • budesonide-formoterol (SYMBICORT) 160-4.5 MCG/ACT inhaler 2 Puff  2 Puff   • lidocaine (LIDODERM) 5 % 1 Patch  1 Patch       Allergies:  No Known Allergies      Vitals: /78   Pulse 60   Temp 36.1 °C (96.9 °F) (Temporal)   Resp 18   Ht 1.702 m (5' 7.01\")   Wt 80.4 kg (177 lb 4 oz)   SpO2 92%             Labs: Reviewed and significant for 9/24: hgb 12.7, Na 127, K 4.5, Cr 0.59, WBC 9.7  Recent Labs     09/22/20  1011 09/23/20  0949 09/24/20  0739   RBC 3.45* 3.53* 3.56*   HEMOGLOBIN 12.4* 12.4* 12.7*   HEMATOCRIT 36.3* 36.6* 37.0*   PLATELETCT 614* 599* 641*     Recent Labs     09/22/20 2058 09/23/20  0949 09/24/20  0739   SODIUM 128* 130* 127*   POTASSIUM 4.2 3.7 4.5   CHLORIDE 91* 90* 87*   CO2 25 25 27   GLUCOSE 101* 142* 116*   BUN 27* 22 20   CREATININE 0.69 0.46* 0.59   CALCIUM 9.4 9.7 9.9     Recent Results (from the past 24 hour(s))   Basic Metabolic Panel    Collection Time: 09/24/20  7:39 AM   Result Value Ref Range    Sodium 127 (L) 135 - 145 mmol/L    Potassium 4.5 3.6 - 5.5 mmol/L    Chloride 87 (L) 96 - 112 mmol/L    Co2 27 20 - 33 mmol/L    Glucose 116 (H) 65 - 99 mg/dL    Bun 20 8 - 22 mg/dL    Creatinine 0.59 0.50 - 1.40 mg/dL    Calcium 9.9 8.5 - 10.5 mg/dL    Anion Gap 13.0 7.0 - 16.0   MAGNESIUM    Collection Time: 09/24/20  7:39 AM   Result Value Ref Range    Magnesium 1.7 1.5 - 2.5 mg/dL   CBC WITHOUT DIFFERENTIAL    Collection Time: 09/24/20  7:39 AM   Result Value Ref Range    WBC 9.8 4.8 - 10.8 K/uL    RBC 3.56 (L) 4.70 - 6.10 M/uL    Hemoglobin 12.7 (L) 14.0 - 18.0 g/dL    Hematocrit 37.0 (L) 42.0 - 52.0 %    .9 (H) 81.4 - 97.8 fL    MCH 35.7 (H) 27.0 - 33.0 pg    MCHC 34.3 33.7 - 35.3 g/dL    RDW 48.9 35.9 - 50.0 fL    Platelet Count 641 (H) 164 - 446 K/uL    MPV 9.7 9.0 - 12.9 fL   ESTIMATED GFR    Collection Time: 09/24/20  7:39 AM   Result Value Ref Range    GFR If  >60 " >60 mL/min/1.73 m 2    GFR If Non African American >60 >60 mL/min/1.73 m 2   URIC ACID    Collection Time: 09/24/20  9:51 AM   Result Value Ref Range    Uric Acid 3.6 2.5 - 8.3 mg/dL   OSMOLALITY SERUM    Collection Time: 09/24/20  9:51 AM   Result Value Ref Range    Osmolality Serum 275 (L) 278 - 298 mOsm/kg H2O           Imaging:  Ct-cta Head With & W/o-post Process  Result Date: 9/12/2020 9/12/2020 12:57 PM HISTORY/REASON FOR EXAM:  Altered mental status. TECHNIQUE/EXAM DESCRIPTION: CT angiogram of the [place] without and with contrast.  Initial precontrast images were obtained of the head from the skull base through the vertex.  Postcontrast images were obtained of the [place] following the power injection of nonionic contrast at 5.0 mL/sec. Thin-section helical images were obtained with overlapping reconstruction interval. Coronal and sagittal multiplanar volume reformats were generated.  3D angiographic images were reviewed on PACS.  Maximum intensity projection (MIP) images were generated and reviewed. 80 mL of Omnipaque 350 nonionic contrast was injected intravenously. Low dose optimization technique was utilized for this CT exam including automated exposure control and adjustment of the mA and/or kV according to patient size. COMPARISON:  None. FINDINGS: There is atherosclerotic plaque involving the vertebral arteries bilaterally. This also involves the basilar and posterior cerebral arteries. There is extensive atherosclerotic plaque involving the intracranial internal carotid arteries bilaterally. The anterior cerebral arteries are patent. The cerebral arteries are patent bilaterally. No evidence of intracranial hemorrhage. 3D angiographic/MIP images of the vasculature confirm the vascular findings as described above.     No evidence of intracranial vascular occlusion or aneurysm.      Ct-cta Neck With & W/o-post Processing  Result Date: 9/12/2020 9/12/2020 12:57 PM HISTORY/REASON FOR  EXAM:  Stroke. TECHNIQUE/EXAM DESCRIPTION: CT angiogram of the neck with contrast. Postcontrast images were obtained of the neck from the great vessels through the skull base following the power injection of nonionic contrast at 5.0 mL/sec. Thin-section helical images were obtained with overlapping reconstruction interval. Coronal and oblique multiplanar volume reformats were generated. Cervical internal carotid artery percent stenosis is calculated using the standard method according to the NASCET criteria wherein a segment of uniform caliber mid or distal cervical internal carotid is used as the reference denominator. 3D angiographic images were reviewed on PACS.  Maximum intensity projection (MIP) images were generated and reviewed 80 mL of Omnipaque 350 nonionic contrast was injected intravenously. Low dose optimization technique was utilized for this CT exam including automated exposure control and adjustment of the mA and/or kV according to patient size. COMPARISON:  None. FINDINGS: There is irregular parenchymal scarring within the left upper lung. There is extensive atherosclerotic plaque involving the aortic arch and originating vessels. There is atherosclerotic plaque involving the common carotid arteries bilaterally which are very tortuous and retropharyngeal in location. Plaque extends into the bifurcation and the proximal internal carotid arteries bilaterally. This results in less than 50% diameter narrowing bilaterally. The vertebral arteries are patent bilaterally. There is scattered atherosclerotic plaque present. There is multifocal degenerative disc disease involving the cervical spine. 3D angiographic/MIP images of the vasculature confirm the vascular findings as described above.     1.  Atherosclerotic plaque involving the common and internal carotid arteries bilaterally most prominently seen at the bifurcations. This results in less than 50% diameter narrowing bilaterally. 2.  Atherosclerotic  plaque involving the vertebral arteries bilaterally. No evidence of occlusion.      Ct-head W/o  Result Date: 9/12/2020 9/12/2020 12:55 PM HISTORY/REASON FOR EXAM:  Confusion and altered mental status. TECHNIQUE/EXAM DESCRIPTION AND NUMBER OF VIEWS: CT of the head without contrast. Contiguous 5 mm axial sections were obtained from the skull base through the vertex. Up to date radiation dose reduction adjustments have been utilized to meet ALARA standards for radiation dose reduction. COMPARISON:  None available FINDINGS: There is no evidence of extra-axial hemorrhage. No intra-axial hemorrhage is noted. There is no brain swelling or edema. No mass effect or midline shift is noted. Ventricles and sulci appear prominent.  There is decreased attenuation in the periventricular white matter.     NO ACUTE ABNORMALITIES ARE NOTED ON CT SCAN OF THE HEAD. Findings are consistent with atrophy.  Decreased attenuation in the periventricular white matter likely indicates microvascular ischemic disease.      Mr-brain-w/o  Result Date: 9/13/2020 9/13/2020 11:25 AM HISTORY/REASON FOR EXAM:  Right-sided weakness, confusion and agitation. TECHNIQUE/EXAM DESCRIPTION AND NUMBER OF VIEWS:  MRI of the brain without contrast. The study was performed on a [employer] Signa 1.5 Natalie MRI scanner. Spoiled-GRASS sagittal, thin-section T2 fast spin-echo axial, T1 coronal, and FLAIR coronal images were obtained of the whole brain. FINDINGS:  Motion artifact degrades the examination and limits evaluation. The calvariae are normal. There are no extra-axial fluid collections. There is a pattern of moderate cerebral atrophy manifest as prominence of sulcal markings over the convexities and vertex along with moderate ventriculomegaly. There is a pattern of moderate supratentorial white matter disease with patchy and focal areas of bright T2 and FLAIR signal in the subcortical and deep white matter of both hemispheres consistent with small vessel ischemic  change versus demyelination or gliosis. There are patchy areas of T2 and FLAIR signal hyperintensity within the bernardino consistent with chronic ischemic pontine gliosis. There is a small curvilinear region of gradient echo signal hypointensity in the left inferior frontal lobe consistent with chronic hemosiderin deposition. There is no mass effect or midline shift. There are no hemorrhagic lesions. The brainstem and posterior fossa structures are unremarkable. Vascular flow voids in the carotid and vertebrobasilar arteries, [place], and dural venous sinuses are intact. The patient is status post cataract repair. The visualized paranasal sinuses and mastoid air cells appear clear.     1.  No evidence of acute territorial infarct, intracranial hemorrhage or mass lesion. 2.  Moderate diffuse cerebral substance loss. 3.  Moderate microangiopathic ischemic change versus demyelination or gliosis. 4.  Chronic ischemic pontine gliosis. 5.  Small focus of chronic hemosiderin deposition within the left inferior frontal lobe likely related to remote hemorrhagic infarct or trauma.      Ct-cerebral Perfusion Analysis  Result Date: 9/12/2020 9/12/2020 12:57 PM HISTORY/REASON FOR EXAM:  Emergency Medical Condition ? Stroke. TECHNIQUE/EXAM DESCRIPTION AND NUMBER OF VIEWS: CT Cerebral Perfusion Analysis. The study was performed on a 128 slice [employer] Lightspeed Multidetector CT scanner. Perfusion data and corresponding time-activity curves are processed and displayed as color-coded maps in the axial plane for Cerebral Blood Flow (CBF), Cerebral Blood Volume  (CBV),T Max and Mean Transit Time (MTT) and are post processed on the Ischemia view-RAPID virtual . 40 mL of Omnipaque 350 nonionic contrast was injected intravenously. Low dose optimization technique was utilized for this CT exam including automated exposure control and adjustment of the mA and/or kV according to patient size. COMPARISON:  None. FINDINGS: Cerebral  blood flow less than 30% = 0 T Max more than 6 seconds = 267 mL Mismatch volume = 267 mL Mismatch ratio = infinite     1.  Cerebral blood flow less than 30% likely representing completed infarct = 0 mL. 2.  T Max more than 6 seconds likely representing combination of completed infarct and ischemia = 267 mL. 3.  Mismatched volume likely representing ischemic brain/penumbra = 267 mL 4.  Please note that the cerebral perfusion was performed on the limited brain tissue around the basal ganglia region. Infarct/ischemia outside the CT perfusion sections can be missed in this study.              ASSESSMENT:  Patient is a 75 y.o. male admitted with AMS that was progressively worsening with right sided weakness. Per neurology, likely due to hypertension and possibly metabolic encephalopathy. MRI negative for stroke and EEG with encephalopathy, no seizure captured.       #Rehab:   -Vitals: stable   -Insurance: Medicare/[place]  -Discharge support: spouse  -Restraints DC'ed on 9/24, monitor without   -Patient with severe cog deficits per SLP note on 9/23, will require 24/7 supervision on discharge; TCC will reach out to verify   -When medically cleared and discharge support verified, anticipate he will meet criteria for IRF   -Rehab Impairment Code: 0002.1 - Brain Dysfunction: Non-Traumatic  -Reason for admission: Acute encephalopathy      #Neuro: acute encephalopathy, possibly from HTN and/or metabolic; no evidence of acute infection or stroke. EEG with encephalopathy, no seizure captured.   -restraints DC’ed 9/24    #Pain: Tylenol , lidoerm patch, sotalol    #CV: PAF on eliquis, sotalol, amlodipine, apixaban, hydralazine, simvastatin     #Pulm: montelukast     #Hyponatremia: Na 127 on 9/24 <= 130 on 9/24    #Hypokalemia: K 4.5 on 9/24 <= 3.7 on 9/23    #Thrombocytosis: Plt 641 on 9/24 <= 599 on 9/23    #Anemia: Hgb 12.7 on 9/24 <= 12.4 on 9/23    #Renal:   -nephrology 9/24: hold NS/salt tabs, consider low dose loop  diuretic    #Supp: MVI     #Bowel: 1x on 9/22, senna s    #Bladder: external condom cath    #DVT PPX: occult feces x1 positive on 9/22, no recurrent melena; eliquis          Thank you for allowing us to participate in the care of this patient.             Pablo Ramos MD  Physical Medicine and Rehabilitation   9/24/2020

## 2020-09-24 NOTE — CARE PLAN
Problem: Knowledge Deficit  Goal: Knowledge of disease process/condition, treatment plan, diagnostic tests, and medications will improve  Outcome: PROGRESSING AS EXPECTED  Note: Pt displays less confusion today, educating pt about disease process/condition/treatment plan/diagnostics/medications, pt displaying some improvement in understanding, requires additional reinforcement  Goal: Knowledge of the prescribed therapeutic regimen will improve  Outcome: PROGRESSING AS EXPECTED     Problem: Skin Integrity  Goal: Risk for impaired skin integrity will decrease  Outcome: PROGRESSING AS EXPECTED  Note: Frequent turns in place, waffle mattress in use, seat cushion in use when in chair, glasses off when sleeping      Problem: Safety - Medical Restraint  Goal: Remains free of injury from restraints (Restraint for Interference with Medical Device)  Description: INTERVENTIONS:  1. Determine that other, less restrictive measures have been tried or would not be effective before applying the restraint  2. Evaluate the patient's condition at the time of restraint application  3. Inform patient/family regarding the reason for restraint  4. Q2H: Monitor safety, psychosocial status, comfort, nutrition and hydration  Outcome: PROGRESSING AS EXPECTED  Note: Pt displays less confusion today, instructed to discontinue telemetry monitoring by Doctor Langford as well as to discontinue restraints and see if pt improves, pt has not attempted to pull at IV or condom cath thus far, will continue to educate and monitor pt.      Problem: Mobility  Goal: Risk for activity intolerance will decrease  Outcome: PROGRESSING AS EXPECTED

## 2020-09-24 NOTE — DISCHARGE PLANNING
[facility] Transitional [facility] Coordination     Physiatry consult complete.  Dr. Ramos recommending - When medically cleared and discharge support verified, anticipate he will meet criteria for IRF.    Call out to spouse Cynthia Alexander to discuss IRF referral.  Explained specifics of inpatient rehab, answered questions/concerns.  Cynthia endorses IRF admission.  She would like to pursue acute rehab in [place], hoping to complete program before the end of October in order to get over the pass to [place] before winter weather hits.  They will stay with one of their daughters in [place] - 2 story home, no steps to enter; bedroom/bath on first floor.  Cynthia tells me she is physically able to assist, will provide 24/7 support/care as needed.  Reviewed Medicare/[place] coverage to include no anticipated out-of-pocket cost for IRF.  Provided TCC contact information for any additional rehab questions.      Will follow for medical clearance in anticipation of IRF admission.

## 2020-09-25 ENCOUNTER — HOSPITAL ENCOUNTER (INPATIENT)
Facility: REHABILITATION | Age: 75
LOS: 11 days | DRG: 071 | End: 2020-10-06
Attending: PHYSICAL MEDICINE & REHABILITATION | Admitting: PHYSICAL MEDICINE & REHABILITATION
Payer: MEDICARE

## 2020-09-25 VITALS
OXYGEN SATURATION: 95 % | WEIGHT: 177.25 LBS | BODY MASS INDEX: 27.82 KG/M2 | DIASTOLIC BLOOD PRESSURE: 58 MMHG | HEART RATE: 68 BPM | HEIGHT: 67 IN | SYSTOLIC BLOOD PRESSURE: 123 MMHG | TEMPERATURE: 97.2 F | RESPIRATION RATE: 18 BRPM

## 2020-09-25 DIAGNOSIS — D47.2 MONOCLONAL GAMMOPATHY PRESENT ON SERUM PROTEIN ELECTROPHORESIS: ICD-10-CM

## 2020-09-25 LAB
ANION GAP SERPL CALC-SCNC: 13 MMOL/L (ref 7–16)
BASOPHILS # BLD AUTO: 0.9 % (ref 0–1.8)
BASOPHILS # BLD: 0.08 K/UL (ref 0–0.12)
BUN SERPL-MCNC: 31 MG/DL (ref 8–22)
CALCIUM SERPL-MCNC: 9.6 MG/DL (ref 8.5–10.5)
CHLORIDE SERPL-SCNC: 90 MMOL/L (ref 96–112)
CO2 SERPL-SCNC: 24 MMOL/L (ref 20–33)
CREAT SERPL-MCNC: 0.86 MG/DL (ref 0.5–1.4)
EOSINOPHIL # BLD AUTO: 0.14 K/UL (ref 0–0.51)
EOSINOPHIL NFR BLD: 1.5 % (ref 0–6.9)
ERYTHROCYTE [DISTWIDTH] IN BLOOD BY AUTOMATED COUNT: 48.7 FL (ref 35.9–50)
GLUCOSE SERPL-MCNC: 104 MG/DL (ref 65–99)
HCT VFR BLD AUTO: 34.7 % (ref 42–52)
HGB BLD-MCNC: 12.1 G/DL (ref 14–18)
IMM GRANULOCYTES # BLD AUTO: 0.04 K/UL (ref 0–0.11)
IMM GRANULOCYTES NFR BLD AUTO: 0.4 % (ref 0–0.9)
LYMPHOCYTES # BLD AUTO: 1.49 K/UL (ref 1–4.8)
LYMPHOCYTES NFR BLD: 16.2 % (ref 22–41)
MAGNESIUM SERPL-MCNC: 1.6 MG/DL (ref 1.5–2.5)
MCH RBC QN AUTO: 35.6 PG (ref 27–33)
MCHC RBC AUTO-ENTMCNC: 34.9 G/DL (ref 33.7–35.3)
MCV RBC AUTO: 102.1 FL (ref 81.4–97.8)
MONOCYTES # BLD AUTO: 1.08 K/UL (ref 0–0.85)
MONOCYTES NFR BLD AUTO: 11.7 % (ref 0–13.4)
NEUTROPHILS # BLD AUTO: 6.39 K/UL (ref 1.82–7.42)
NEUTROPHILS NFR BLD: 69.3 % (ref 44–72)
NRBC # BLD AUTO: 0 K/UL
NRBC BLD-RTO: 0 /100 WBC
PLATELET # BLD AUTO: 582 K/UL (ref 164–446)
PMV BLD AUTO: 10 FL (ref 9–12.9)
POTASSIUM SERPL-SCNC: 4.5 MMOL/L (ref 3.6–5.5)
RBC # BLD AUTO: 3.4 M/UL (ref 4.7–6.1)
SODIUM SERPL-SCNC: 127 MMOL/L (ref 135–145)
VIT B1 BLD-MCNC: 118 NMOL/L (ref 70–180)
WBC # BLD AUTO: 9.2 K/UL (ref 4.8–10.8)

## 2020-09-25 PROCEDURE — A9270 NON-COVERED ITEM OR SERVICE: HCPCS | Performed by: INTERNAL MEDICINE

## 2020-09-25 PROCEDURE — 700102 HCHG RX REV CODE 250 W/ 637 OVERRIDE(OP): Performed by: PHYSICAL MEDICINE & REHABILITATION

## 2020-09-25 PROCEDURE — 99239 HOSP IP/OBS DSCHRG MGMT >30: CPT | Performed by: HOSPITALIST

## 2020-09-25 PROCEDURE — 83735 ASSAY OF MAGNESIUM: CPT

## 2020-09-25 PROCEDURE — 94760 N-INVAS EAR/PLS OXIMETRY 1: CPT

## 2020-09-25 PROCEDURE — 81001 URINALYSIS AUTO W/SCOPE: CPT

## 2020-09-25 PROCEDURE — 700102 HCHG RX REV CODE 250 W/ 637 OVERRIDE(OP): Performed by: INTERNAL MEDICINE

## 2020-09-25 PROCEDURE — A9270 NON-COVERED ITEM OR SERVICE: HCPCS | Performed by: PHYSICAL MEDICINE & REHABILITATION

## 2020-09-25 PROCEDURE — 80048 BASIC METABOLIC PNL TOTAL CA: CPT

## 2020-09-25 PROCEDURE — 85025 COMPLETE CBC W/AUTO DIFF WBC: CPT

## 2020-09-25 PROCEDURE — A9270 NON-COVERED ITEM OR SERVICE: HCPCS | Performed by: HOSPITALIST

## 2020-09-25 PROCEDURE — 770010 HCHG ROOM/CARE - REHAB SEMI PRIVAT*

## 2020-09-25 PROCEDURE — 99223 1ST HOSP IP/OBS HIGH 75: CPT | Mod: AI | Performed by: PHYSICAL MEDICINE & REHABILITATION

## 2020-09-25 PROCEDURE — 700102 HCHG RX REV CODE 250 W/ 637 OVERRIDE(OP): Performed by: HOSPITALIST

## 2020-09-25 PROCEDURE — 92526 ORAL FUNCTION THERAPY: CPT

## 2020-09-25 PROCEDURE — 97530 THERAPEUTIC ACTIVITIES: CPT

## 2020-09-25 PROCEDURE — 97116 GAIT TRAINING THERAPY: CPT

## 2020-09-25 PROCEDURE — 97535 SELF CARE MNGMENT TRAINING: CPT | Mod: CO

## 2020-09-25 PROCEDURE — 36415 COLL VENOUS BLD VENIPUNCTURE: CPT

## 2020-09-25 RX ORDER — BUDESONIDE AND FORMOTEROL FUMARATE DIHYDRATE 160; 4.5 UG/1; UG/1
2 AEROSOL RESPIRATORY (INHALATION) 2 TIMES DAILY PRN
Status: DISCONTINUED | OUTPATIENT
Start: 2020-09-25 | End: 2020-10-06 | Stop reason: HOSPADM

## 2020-09-25 RX ORDER — FERROUS SULFATE 325(65) MG
325 TABLET ORAL
Status: CANCELLED | OUTPATIENT
Start: 2020-09-27

## 2020-09-25 RX ORDER — FERROUS SULFATE 325(65) MG
325 TABLET ORAL
Status: DISCONTINUED | OUTPATIENT
Start: 2020-09-27 | End: 2020-10-06 | Stop reason: HOSPADM

## 2020-09-25 RX ORDER — HYDRALAZINE HYDROCHLORIDE 25 MG/1
25 TABLET, FILM COATED ORAL EVERY 8 HOURS
Status: DISCONTINUED | OUTPATIENT
Start: 2020-09-25 | End: 2020-09-30

## 2020-09-25 RX ORDER — HYDRALAZINE HYDROCHLORIDE 25 MG/1
25 TABLET, FILM COATED ORAL EVERY 8 HOURS
Status: CANCELLED | OUTPATIENT
Start: 2020-09-25

## 2020-09-25 RX ORDER — POTASSIUM CHLORIDE 750 MG/1
10 TABLET, EXTENDED RELEASE ORAL DAILY
Qty: 60 TAB | Refills: 11 | Status: SHIPPED
Start: 2020-09-26

## 2020-09-25 RX ORDER — SIMVASTATIN 20 MG
20 TABLET ORAL EVERY EVENING
Status: CANCELLED | OUTPATIENT
Start: 2020-09-25

## 2020-09-25 RX ORDER — AMLODIPINE BESYLATE 5 MG/1
10 TABLET ORAL
Status: CANCELLED | OUTPATIENT
Start: 2020-09-25

## 2020-09-25 RX ORDER — MONTELUKAST SODIUM 10 MG/1
10 TABLET ORAL DAILY
Status: DISCONTINUED | OUTPATIENT
Start: 2020-09-26 | End: 2020-10-06 | Stop reason: HOSPADM

## 2020-09-25 RX ORDER — SIMVASTATIN 20 MG
20 TABLET ORAL EVERY EVENING
Status: DISCONTINUED | OUTPATIENT
Start: 2020-09-25 | End: 2020-10-06 | Stop reason: HOSPADM

## 2020-09-25 RX ORDER — BUDESONIDE AND FORMOTEROL FUMARATE DIHYDRATE 160; 4.5 UG/1; UG/1
2 AEROSOL RESPIRATORY (INHALATION) 2 TIMES DAILY PRN
Status: CANCELLED | OUTPATIENT
Start: 2020-09-25

## 2020-09-25 RX ORDER — MONTELUKAST SODIUM 10 MG/1
10 TABLET ORAL DAILY
Status: CANCELLED | OUTPATIENT
Start: 2020-09-26

## 2020-09-25 RX ORDER — OXYCODONE HYDROCHLORIDE 5 MG/1
2.5 TABLET ORAL
Status: DISCONTINUED | OUTPATIENT
Start: 2020-09-25 | End: 2020-10-06 | Stop reason: HOSPADM

## 2020-09-25 RX ORDER — POLYETHYLENE GLYCOL 3350 17 G/17G
1 POWDER, FOR SOLUTION ORAL
Status: DISCONTINUED | OUTPATIENT
Start: 2020-09-25 | End: 2020-10-02

## 2020-09-25 RX ORDER — SOTALOL HYDROCHLORIDE 80 MG/1
80 TABLET ORAL TWICE DAILY
Status: CANCELLED | OUTPATIENT
Start: 2020-09-25

## 2020-09-25 RX ORDER — THIAMINE MONONITRATE (VIT B1) 100 MG
100 TABLET ORAL DAILY
Status: CANCELLED | OUTPATIENT
Start: 2020-09-26

## 2020-09-25 RX ORDER — SODIUM CHLORIDE 1 G/1
1 TABLET ORAL
Qty: 90 TAB | Refills: 3 | Status: ON HOLD
Start: 2020-09-25 | End: 2020-10-05 | Stop reason: SDUPTHER

## 2020-09-25 RX ORDER — POTASSIUM CHLORIDE 20 MEQ/1
10 TABLET, EXTENDED RELEASE ORAL DAILY
Status: DISCONTINUED | OUTPATIENT
Start: 2020-09-26 | End: 2020-09-25 | Stop reason: HOSPADM

## 2020-09-25 RX ORDER — ACETAMINOPHEN 500 MG
500 TABLET ORAL 4 TIMES DAILY
Status: DISCONTINUED | OUTPATIENT
Start: 2020-09-25 | End: 2020-10-02

## 2020-09-25 RX ORDER — SODIUM CHLORIDE 1 G/1
1 TABLET ORAL
Status: CANCELLED | OUTPATIENT
Start: 2020-09-25

## 2020-09-25 RX ORDER — LIDOCAINE 50 MG/G
1 PATCH TOPICAL
Status: CANCELLED | OUTPATIENT
Start: 2020-09-25

## 2020-09-25 RX ORDER — LANOLIN ALCOHOL/MO/W.PET/CERES
100 CREAM (GRAM) TOPICAL DAILY
Qty: 30 TAB | Status: SHIPPED
Start: 2020-09-26

## 2020-09-25 RX ORDER — BISACODYL 10 MG
10 SUPPOSITORY, RECTAL RECTAL
Status: DISCONTINUED | OUTPATIENT
Start: 2020-09-25 | End: 2020-10-02

## 2020-09-25 RX ORDER — OXYCODONE HYDROCHLORIDE 5 MG/1
2.5 TABLET ORAL
Status: CANCELLED | OUTPATIENT
Start: 2020-09-25

## 2020-09-25 RX ORDER — LIDOCAINE 50 MG/G
1 PATCH TOPICAL
Status: DISCONTINUED | OUTPATIENT
Start: 2020-09-25 | End: 2020-10-06 | Stop reason: HOSPADM

## 2020-09-25 RX ORDER — SODIUM CHLORIDE 1 G/1
1 TABLET ORAL
Status: DISCONTINUED | OUTPATIENT
Start: 2020-09-25 | End: 2020-09-25 | Stop reason: HOSPADM

## 2020-09-25 RX ORDER — FERROUS SULFATE 325(65) MG
325 TABLET ORAL
Qty: 30 TAB | Status: ON HOLD
Start: 2020-09-27 | End: 2020-10-05 | Stop reason: SDUPTHER

## 2020-09-25 RX ORDER — SOTALOL HYDROCHLORIDE 80 MG/1
80 TABLET ORAL TWICE DAILY
Status: DISCONTINUED | OUTPATIENT
Start: 2020-09-25 | End: 2020-10-06 | Stop reason: HOSPADM

## 2020-09-25 RX ORDER — AMLODIPINE BESYLATE 10 MG/1
10 TABLET ORAL
Qty: 30 TAB | Status: ON HOLD
Start: 2020-09-25 | End: 2020-10-05 | Stop reason: SDUPTHER

## 2020-09-25 RX ORDER — AMOXICILLIN 250 MG
2 CAPSULE ORAL 2 TIMES DAILY
Status: CANCELLED | OUTPATIENT
Start: 2020-09-25

## 2020-09-25 RX ORDER — ACETAMINOPHEN 500 MG
500 TABLET ORAL 4 TIMES DAILY
Status: CANCELLED | OUTPATIENT
Start: 2020-09-25

## 2020-09-25 RX ORDER — BISACODYL 10 MG
10 SUPPOSITORY, RECTAL RECTAL
Status: CANCELLED | OUTPATIENT
Start: 2020-09-25

## 2020-09-25 RX ORDER — AMLODIPINE BESYLATE 5 MG/1
10 TABLET ORAL
Status: DISCONTINUED | OUTPATIENT
Start: 2020-09-25 | End: 2020-10-01

## 2020-09-25 RX ORDER — HYDRALAZINE HYDROCHLORIDE 25 MG/1
25 TABLET, FILM COATED ORAL EVERY 8 HOURS
Qty: 90 TAB | Status: ON HOLD
Start: 2020-09-25 | End: 2020-10-05

## 2020-09-25 RX ORDER — THIAMINE MONONITRATE (VIT B1) 100 MG
100 TABLET ORAL DAILY
Status: DISCONTINUED | OUTPATIENT
Start: 2020-09-26 | End: 2020-10-06 | Stop reason: HOSPADM

## 2020-09-25 RX ORDER — POLYETHYLENE GLYCOL 3350 17 G/17G
1 POWDER, FOR SOLUTION ORAL
Status: CANCELLED | OUTPATIENT
Start: 2020-09-25

## 2020-09-25 RX ORDER — SODIUM CHLORIDE 1 G/1
1 TABLET ORAL
Status: DISCONTINUED | OUTPATIENT
Start: 2020-09-25 | End: 2020-10-06 | Stop reason: HOSPADM

## 2020-09-25 RX ORDER — AMOXICILLIN 250 MG
2 CAPSULE ORAL 2 TIMES DAILY
Status: DISCONTINUED | OUTPATIENT
Start: 2020-09-25 | End: 2020-10-02

## 2020-09-25 RX ADMIN — Medication 100 MG: at 05:09

## 2020-09-25 RX ADMIN — AMLODIPINE BESYLATE 10 MG: 5 TABLET ORAL at 22:04

## 2020-09-25 RX ADMIN — ACETAMINOPHEN 500 MG: 500 TABLET, FILM COATED ORAL at 18:01

## 2020-09-25 RX ADMIN — MONTELUKAST 10 MG: 10 TABLET, FILM COATED ORAL at 05:09

## 2020-09-25 RX ADMIN — APIXABAN 2.5 MG: 5 TABLET, FILM COATED ORAL at 05:09

## 2020-09-25 RX ADMIN — ACETAMINOPHEN 500 MG: 500 TABLET ORAL at 08:17

## 2020-09-25 RX ADMIN — Medication 1 G: at 18:01

## 2020-09-25 RX ADMIN — HYDRALAZINE HYDROCHLORIDE 25 MG: 25 TABLET, FILM COATED ORAL at 05:09

## 2020-09-25 RX ADMIN — SOTALOL HYDROCHLORIDE 80 MG: 80 TABLET ORAL at 05:09

## 2020-09-25 RX ADMIN — FERROUS SULFATE TAB 325 MG (65 MG ELEMENTAL FE) 325 MG: 325 (65 FE) TAB at 08:17

## 2020-09-25 RX ADMIN — SIMVASTATIN 20 MG: 20 TABLET, FILM COATED ORAL at 22:04

## 2020-09-25 RX ADMIN — SOTALOL HYDROCHLORIDE 80 MG: 80 TABLET ORAL at 18:01

## 2020-09-25 RX ADMIN — HYDRALAZINE HYDROCHLORIDE 25 MG: 25 TABLET, FILM COATED ORAL at 22:09

## 2020-09-25 RX ADMIN — HYDRALAZINE HYDROCHLORIDE 25 MG: 25 TABLET, FILM COATED ORAL at 13:08

## 2020-09-25 RX ADMIN — APIXABAN 2.5 MG: 5 TABLET, FILM COATED ORAL at 22:05

## 2020-09-25 RX ADMIN — Medication 1 G: at 10:29

## 2020-09-25 RX ADMIN — ACETAMINOPHEN 500 MG: 500 TABLET ORAL at 13:08

## 2020-09-25 RX ADMIN — POTASSIUM CHLORIDE 20 MEQ: 1500 TABLET, EXTENDED RELEASE ORAL at 05:09

## 2020-09-25 RX ADMIN — Medication 400 MG: at 10:29

## 2020-09-25 RX ADMIN — ACETAMINOPHEN 500 MG: 500 TABLET, FILM COATED ORAL at 22:06

## 2020-09-25 ASSESSMENT — COGNITIVE AND FUNCTIONAL STATUS - GENERAL
STANDING UP FROM CHAIR USING ARMS: A LITTLE
MOBILITY SCORE: 20
HELP NEEDED FOR BATHING: A LITTLE
DAILY ACTIVITIY SCORE: 18
PERSONAL GROOMING: A LITTLE
SUGGESTED CMS G CODE MODIFIER MOBILITY: CJ
WALKING IN HOSPITAL ROOM: A LITTLE
EATING MEALS: A LITTLE
DRESSING REGULAR LOWER BODY CLOTHING: A LITTLE
CLIMB 3 TO 5 STEPS WITH RAILING: A LITTLE
MOVING TO AND FROM BED TO CHAIR: A LITTLE
TOILETING: A LITTLE
DRESSING REGULAR UPPER BODY CLOTHING: A LITTLE
SUGGESTED CMS G CODE MODIFIER DAILY ACTIVITY: CK

## 2020-09-25 ASSESSMENT — PATIENT HEALTH QUESTIONNAIRE - PHQ9
2. FEELING DOWN, DEPRESSED, IRRITABLE, OR HOPELESS: NOT AT ALL
1. LITTLE INTEREST OR PLEASURE IN DOING THINGS: NOT AT ALL
SUM OF ALL RESPONSES TO PHQ9 QUESTIONS 1 AND 2: 0
2. FEELING DOWN, DEPRESSED, IRRITABLE, OR HOPELESS: NOT AT ALL
1. LITTLE INTEREST OR PLEASURE IN DOING THINGS: NOT AT ALL
SUM OF ALL RESPONSES TO PHQ9 QUESTIONS 1 AND 2: 0

## 2020-09-25 ASSESSMENT — FIBROSIS 4 INDEX: FIB4 SCORE: 0.59

## 2020-09-25 ASSESSMENT — LIFESTYLE VARIABLES
EVER_SMOKED: YES
CONSUMPTION TOTAL: NEGATIVE
HAVE YOU EVER FELT YOU SHOULD CUT DOWN ON YOUR DRINKING: NO
ALCOHOL_USE: NO
TOTAL SCORE: 0
ON A TYPICAL DAY WHEN YOU DRINK ALCOHOL HOW MANY DRINKS DO YOU HAVE: 0
EVER HAD A DRINK FIRST THING IN THE MORNING TO STEADY YOUR NERVES TO GET RID OF A HANGOVER: NO
EVER FELT BAD OR GUILTY ABOUT YOUR DRINKING: NO
HAVE PEOPLE ANNOYED YOU BY CRITICIZING YOUR DRINKING: NO
HOW MANY TIMES IN THE PAST YEAR HAVE YOU HAD 5 OR MORE DRINKS IN A DAY: 0
AVERAGE NUMBER OF DAYS PER WEEK YOU HAVE A DRINK CONTAINING ALCOHOL: 0
TOTAL SCORE: 0
TOTAL SCORE: 0

## 2020-09-25 ASSESSMENT — ENCOUNTER SYMPTOMS
MYALGIAS: 0
HEADACHES: 0
NAUSEA: 0
SHORTNESS OF BREATH: 0
INSOMNIA: 0
CHILLS: 0
NERVOUS/ANXIOUS: 0
COUGH: 0
POLYDIPSIA: 0
FOCAL WEAKNESS: 0
EYE PAIN: 0
BACK PAIN: 0
EYE DISCHARGE: 0
VOMITING: 0
FEVER: 0
DIARRHEA: 0
ABDOMINAL PAIN: 0

## 2020-09-25 ASSESSMENT — GAIT ASSESSMENTS
DISTANCE (FEET): 250
ASSISTIVE DEVICE: FRONT WHEEL WALKER
DEVIATION: DECREASED HEEL STRIKE;DECREASED TOE OFF
GAIT LEVEL OF ASSIST: MINIMAL ASSIST

## 2020-09-25 NOTE — PROGRESS NOTES
[facility] Nephrology Daily Progress Note    Date of Service  9/25/2020    HISTORY OF PRESENT ILLNESS:    Per report as patient is not able to give history, patient is a 75 y.o. male who presented 9/12/2020 with altered mental status.  Apparently the patient was transferred for evaluation of altered mental status a few days ago progressively got worse was associated with some right-sided weakness that started in the morning.  Also reported to have a fall a week prior to presentation.  The patient was transferred as a code stroke he was evaluated by neurology and felt that his symptoms are likely related to hypertensive and possibly metabolic encephalopathy.  Etiology of encephalopathy was unclear thought maybe metabolic ?hyponatremia as cause.  EEG with no seizures, no evidence of infection.  Imaging with no evidence of acute infarct.     Nephrology consulted for hyponatremia.       Patient seen at bedside was AOx3 per nurse [facility]     Daily Nephrology Summary  9/18 - Consult done  9/19 - Patient was started on NS at 100cc/hr last night.  Sodium fluctuating between 120 and 124.  Complains of joint pain and abdominal pain.  9/20 - Not alert to place.  Still with slight abdominal pain.  Denies nausea.  No SOB.  9/21 - alert and oriented x 3 this AM, answers questions appropriately  9/22 - alert and oriented x 3, Na and K improved  9/23 - alert, oriented, Mg low at 1.4  9/24 - poor PO intake, Na decreased, K and Mg wnl, uric acid 3.6, patient slightly confused  9/25 - I/O not well recorded, Na unchanged at 127, K 4.5, Abebe 101 and UOsm 623---off salt tabs/diuretics/saline        Review of Systems  Review of Systems   Constitutional: Negative for chills and fever.   HENT: Negative for congestion, ear pain and nosebleeds.    Eyes: Negative for pain and discharge.   Respiratory: Negative for cough and shortness of breath.    Cardiovascular: Negative for chest pain and leg swelling.    Gastrointestinal: Negative for abdominal pain, diarrhea, nausea and vomiting.   Genitourinary: Negative for dysuria, frequency, hematuria and urgency.   Musculoskeletal: Negative for back pain and myalgias.   Skin: Negative for itching and rash.   Neurological: Negative for focal weakness and headaches.   Endo/Heme/Allergies: Negative for environmental allergies and polydipsia.   Psychiatric/Behavioral: The patient is not nervous/anxious and does not have insomnia.         Physical Exam  Temp:  [35.9 °C (96.7 °F)-37.1 °C (98.8 °F)] 36.2 °C (97.2 °F)  Pulse:  [60-73] 66  Resp:  [16-18] 18  BP: (114-144)/(51-78) 114/51  SpO2:  [94 %-96 %] 95 %    Physical Exam  Constitutional:       General: He is not in acute distress.  HENT:      Head: Normocephalic. No abrasion.      Right Ear: External ear normal.      Left Ear: External ear normal.      Nose: Nose normal.      Mouth/Throat:      Mouth: Mucous membranes are dry.      Pharynx: Oropharynx is clear.   Eyes:      General: No scleral icterus.        Right eye: No discharge.         Left eye: No discharge.      Extraocular Movements: Extraocular movements intact.   Neck:      Musculoskeletal: Neck supple. No neck rigidity or muscular tenderness.   Cardiovascular:      Rate and Rhythm: Normal rate.   Pulmonary:      Effort: Pulmonary effort is normal.      Breath sounds: Normal breath sounds.   Abdominal:      General: Abdomen is flat.      Palpations: Abdomen is soft.   Musculoskeletal:      Right lower leg: No edema.      Left lower leg: No edema.   Skin:     General: Skin is warm and dry.   Neurological:      General: No focal deficit present.      Mental Status: He is alert and oriented to person, place, and time.   Psychiatric:         Mood and Affect: Mood normal.         Behavior: Behavior normal.         Fluids    Intake/Output Summary (Last 24 hours) at 9/25/2020 0938  Last data filed at 9/24/2020 2000  Gross per 24 hour   Intake 50 ml   Output 100 ml   Net  -50 ml       Laboratory  Recent Labs     09/23/20  0949 09/24/20  0739 09/25/20  0612   WBC 8.3 9.8 9.2   RBC 3.53* 3.56* 3.40*   HEMOGLOBIN 12.4* 12.7* 12.1*   HEMATOCRIT 36.6* 37.0* 34.7*   .7* 103.9* 102.1*   MCH 35.1* 35.7* 35.6*   MCHC 33.9 34.3 34.9   RDW 49.5 48.9 48.7   PLATELETCT 599* 641* 582*   MPV 10.0 9.7 10.0     Recent Labs     09/23/20  0949 09/24/20  0739 09/25/20  0612   SODIUM 130* 127* 127*   POTASSIUM 3.7 4.5 4.5   CHLORIDE 90* 87* 90*   CO2 25 27 24   GLUCOSE 142* 116* 104*   BUN 22 20 31*   CREATININE 0.46* 0.59 0.86   CALCIUM 9.7 9.9 9.6         No results for input(s): NTPROBNP in the last 72 hours.        Imaging  reviewed     Assessment/Plan  IMPRESSION:  - Hyponatremia    * Etiology initially appeared mixed, initial urine lytes/osm collected after patient had been provided IVF, unclear reliability.     * TSH wnl, protein wnl; on leupron, but no documented h/o recent prostate procedure; prealbumin low, K low, uric acid 3.6, BUN wnl/near normal; CO2 wnl, hypokalemia present since admission now corrected      * Na initially improved with IVF, then decreased again, has been labile; he has also received lasix; recently started on salt tabs, subsequently DC    Uric acid <4     * Patient is euvolemic on exam with mildly reduced serum osm, no hypotension, Abebe 101 and UOsm 623 (off NS, diuretics, salt)---findings now c/w SIADH      - Proteinuria    * Given slightly elevated urine protein and anemia checked SPEP/ UPEP     * Small blood in urine likely from enrique, don't suspect GN    * Can repeat UA when enrique is removed    - Acute Encephalopathy    * Unclear etiology, initially thought to be associated with hyponatremia and HTN, doubt associated with current mild/moderate hyponatremia that has been relatively stable    * Continue management per primary team and neurology    - Anemia    * Anemia of chronic disease    * Ferritin 550, %sat 9    - Hypokalemia    * present since admission, may  have worsened with diuretics use    * Hold diuretics     * Keep K >4    - Hypomagnesemia    * Replete PRN, ordered PO Mg supplement 9/25    * Keep Mg >2    - Paroxysmal Atrial Fibrillation    * management per primary team, on sotalol and metoprolol    - HTN    * goal <140/90     - Dyslipidemia    - Back Pain--none today    - hyperglycemia (mild), A1c 5.6     PLAN:    - Fluid restriction to 1L daily, start 1g salt tab TID with meals, may need to consider low dose loop diuretic  - KCl and Mg supplementation ordered, keep K>4, Mg>2  - Continue PO K supplement, start PO Mg supplement  - Strict I/O  - High protein diet, high potassium diet  - Avoid nephrotoxic agents  - SPEP with restriction of protein migration in   the gamma region.  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 FLC pending.  - Continue amlodipine 10mg qhs  - check metabolic panel qam  - continue hydralazine 25mq [id number], increase as needed    PMH/PSH/FH/SH/meds/labs reviewed      Other management per primary service

## 2020-09-25 NOTE — DISCHARGE PLANNING
Jordan has remained out of restraints since yesterday.  Msg placed to Dr. Langford-seeking medical clearance.

## 2020-09-25 NOTE — CARE PLAN
Problem: Safety - Medical Restraint  Goal: Free from restraint(s) (Restraint for Interference with Medical Device)  Description: INTERVENTIONS:  1. ONCE/SHIFT or MINIMUM Q12H: Assess and document the continuing need for restraints  2. Q24H: Continued use of restraint requires LIP to perform face to face examination and written order  3. Identify and implement measures to help patient regain control  Outcome: PROGRESSING AS EXPECTED   Restraints discontinued on dayshift today, self release lap belt in place, patient educated on need to call for assistance before getting out of bed.  Problem: Pain Management  Goal: Pain level will decrease to patient's comfort goal  Outcome: PROGRESSING SLOWER THAN EXPECTED   Tylenol scheduled throughout day

## 2020-09-25 NOTE — THERAPY
"Speech Language Pathology  Daily Treatment     Patient Name: Jordan Alexander  Age:  75 y.o., Sex:  male  Medical Record #: [id number]  Today's Date: 9/25/2020     Precautions  Precautions: (P) Fall Risk, Swallow Precautions ( See Comments)  Comments: (P) GLF    Assessment    Pt to transfer to rehab today per RN. Pt up in ryan chair. When asked pt able to accurately state place, month, date, and year without cues. Pt seen following PT and after approx 20 min of dysphagia tx, pt requested to stop related to \"I feel full and I am tired.\" CNA transferred pt back to bed. SLP collaborated with nurs regarding plan to continue with PU4/MT2 with recommendation to retry MM5 texture in the next 1-2 days following transfer to rehab. Pt with greater than 1 min masticating 1/2 tsp amounts of MM pork with gravy. Pt spitting out one bolus stating \"it is too much.\" Pt then given small, nearly minced pieces, of diced peaches (that SLP cut into smaller pieces than diced) with mild increase in time to masticate and no oral residue and no complaints regarding texture to SLP,. No delay,no coughing, or difficulty observed with NTL from the cup.     Plan    Continue current treatment plan.    Discharge Recommendations: (P) Recommend post-acute placement for additional speech therapy services prior to discharge home       09/25/20 1150   Voice   Comments WNL   Dysphagia    Dysphagia X   Positioning / Behavior Modification Modulate Rate or Bite Size;Multiple Swallows;Other (see Comments)  (OOB and in ryan chair for meals)   Diet / Liquid Recommendation Mildly Thick (2) - (Nectar Thick);Puree (4)   Nutritional Liquid Intake Rating Scale Thickened beverages (mildly thick unless otherwise specified)   Nutritional Food Intake Rating Scale Total oral diet of a single consistency   Nursing Communication Swallow Precaution Sign Posted at Head of Bed   Skilled Intervention Compensatory Strategies;Verbal Cueing   Recommended Route of Medication " "Administration   Medication Administration  Crush all Medications in Puree   Patient / Family Goals   Patient / Family Goal #1 \"I sure am hungry\"   Goal #1 Outcome Progressing as expected   Short Term Goals   Goal Outcome # 1 Progressing as expected   Short Term Goal # 2 B  Pt will consume PU4/MT2 diet with no overt s/sx of aspiration, given min cues to swallowing strategies   Goal Outcome  # 2 B Progressing as expected   Short Term Goal # 3 Pt will consume MM during dysphagia tx with SLP using recommended swallow strategies.    Goal Outcome  # 3 Progressing slower than expected       "

## 2020-09-25 NOTE — DISCHARGE PLANNING
Anticipated Discharge Disposition:   Acute Rehab    Action:    RN CHRIS spoke with patient's spouse yesterday afternoon.  She requested a referral to be sent to [facility].  Choice form faxed to [facility].  Her 2nd choice would be [facility] in [place].  She can provide 24/7 support for patient after rehab.  She and her  will be staying in [place] after the rehab stay where one of their children lives.    TCC Sameer with [facility] updated.    Barriers to Discharge:    Medical clearance    Plan:    F/U with [facility] TCC.

## 2020-09-25 NOTE — H&P
REHABILITATION HISTORY AND PHYSICAL/POST ADMISSION PHYSICAL EVALUATION    Date of Admission: 9/25/2020  12:26 PM  Jordan Alexander  Room/bed info not found    [facility] Code / Diagnosis to Support: 0002.1 - Brain Dysfunction: Non-Traumatic  Reason for admission: Acute encephalopathy    HPI:  Adapted from Dr. Ramos's consult 9/24  The patient is a 75 y.o. male with a past medical history of PAF, DL, anemia;  who initially  Presented to [facility] on 9/12/2020 12:35 PM with AMS that was progressively worsening with right sided weakness. Code stroke was called. No stroke was found. Patient determined to be encephalopathic, likely due to hypertension and possibly metabolic. Hospital course with hyponatremia/?SIADH/nephrology following, anemia, hypokalemia, thrombocytosis, and paroxysmal Afib on eliquis.     Patient was evaluated by Rehab Medicine physician and Physical Therapy, Occupational Therapy and Speech Therapy and determined to be appropriate for acute inpatient rehab and was transferred to [facility] on 9/25/2020    Pre-mobidly, the patient lived in a two level home with spouse.  With this acute therapeutic intervention, this patient hopes to improve his functional status, and return to independent living with the supportive care of spouse.    Patient arrived at rehab in good condition. He endorses some confusion but states it is clearing. He has no new complaints and ROS is negative.       REVIEW OF SYSTEMS:         PMH:  No past medical history on file.    PSH:  No past surgical history on file.    FAMILY HISTORY:  Non-contributory to today's issue      MEDICATIONS:  No current facility-administered medications for this encounter.      No current outpatient medications on file.     Facility-Administered Medications Ordered in Other Encounters   Medication Dose   • sodium chloride (SALT) tablet 1 g  1 g   • magnesium oxide (MAG-OX) tablet 400 mg  400 mg   • [START ON 9/26/2020] potassium chloride SA  (Kdur) tablet 10 mEq  10 mEq   • hydrALAZINE (APRESOLINE) tablet 25 mg  25 mg   • ferrous sulfate tablet 325 mg  325 mg   • amLODIPine (NORVASC) tablet 10 mg  10 mg   • sotalol (BETAPACE) tablet 80 mg  80 mg   • apixaban (ELIQUIS) tablet 2.5 mg  2.5 mg   • thiamine tablet 100 mg  100 mg   • acetaminophen (TYLENOL) tablet 500 mg  500 mg   • simvastatin (ZOCOR) tablet 20 mg  20 mg   • senna-docusate (PERICOLACE or SENOKOT S) 8.6-50 MG per tablet 2 Tab  2 Tab    And   • polyethylene glycol/lytes (MIRALAX) PACKET 1 Packet  1 Packet    And   • bisacodyl (DULCOLAX) suppository 10 mg  10 mg   • montelukast (SINGULAIR) tablet 10 mg  10 mg   • labetalol (NORMODYNE/TRANDATE) injection 10 mg  10 mg   • budesonide-formoterol (SYMBICORT) 160-4.5 MCG/ACT inhaler 2 Puff  2 Puff   • lidocaine (LIDODERM) 5 % 1 Patch  1 Patch       ALLERGIES:  Patient has no known allergies.    PSYCHOSOCIAL HISTORY:  Living Site:  Home  Living With:  spouse  Caregiver's availability:  Not Applicable  Number of stairs:  0  Substance use history:  NA    LEVEL OF FUNCTION PRIOR TO DISABILTY:  Previously independent     LEVEL OF FUNCTION PRIOR TO ADMISSION to [facility]:  Prior Level of Function / Living Situation:   Physical Therapy: Prior Services: Home-Independent  Housing / Facility: Unable To Determine At This Time  Equipment Owned: Unable to Determine At This Time  Lives with - Patient's Self Care Capacity: Spouse(Jan)  Bed Mobility: Independent  Transfer Status: Independent  Ambulation: Independent  Assistive Devices Used: Unable to Determine At This Time  Stairs: Unable To Determine At This Time  Current Level of Function:   Gait Level Of Assist: Minimal Assist  Assistive Device: Front Wheel Walker  Distance (Feet): 220  Deviation: Decreased Toe Off, Decreased Heel Strike, Bradykinetic  # of Stairs Climbed: 0  Skilled Intervention: Verbal Cuing, Tactile Cuing, Sequencing, Facilitation  Supine to Sit: Minimal Assist  Sit to  Supine: (left up in chair )  Scooting: Supervised  Skilled Intervention: Verbal Cuing  Comments: HOB elevated, pt using rails  Sit to Stand: Minimal Assist  Bed, Chair, Wheelchair Transfer: Minimal Assist  Transfer Method: Stand Step  Skilled Intervention: Verbal Cuing, Tactile Cuing, Facilitation  Sitting Edge of Bed: 10 min  Standing: 10 min total  Occupational Therapy:      Prior Services: Home-Independent  Housing / Facility: Unable To Determine At This Time  Occupation (Pre-Hospital Vocational): Retired Due To Age  Current Level of Function:   Eating: (NPO)  Upper Body Dressing: Minimal Assist  Lower Body Dressing: Moderate Assist  Toileting: Maximal Assist  Skilled Intervention: Verbal Cuing, Sequencing  Comments: sequencing for ADLs  Speech Language Pathology:   Problem List: Dysphagia, Cognitive-Linguistic Deficits  Diet / Liquid Recommendation: Mildly Thick (2) - (Nectar Thick)  Comments: Inconsistently timed swallow with 6-12 second delay and unable to follow directives for protective maneuvers.  Risk of descending penetration/aspiration.    Rehabilitation Prognosis/Potential: Fair  Estimated Length of Stay: 14-21 days    CURRENT LEVEL OF FUNCTION:   Same as level of function prior to admission to [facility]    PHYSICAL EXAM:     VITAL SIGNS:   vitals were not taken for this visit.     GENERAL: No apparent distress  HEENT: Normocephalic/atraumatic, EOMI, PERRL and No nystagmus  CARDIAC: Regular rate and rhythm, normal S1, S2   LUNGS: Clear to auscultation   ABDOMINAL: soft, nontender and nondistended    EXTREMITIES: no spasticity, no edema or 2+ bilateral DP/PT pulses    NEURO:    Mental status: answers questions appropriately follows commands  Speech: fluent, no aphasia or dysarthria    CRANIAL NERVES:  2,3: visual acuity grossly intact, PERRL  3,4,6: EOMI bilaterally, no nystagmus or diplopia  7: no facial asymmetry  8: hearing grossly intact  9,10: symmetric palate elevation  11:  SCM/Trapezius strength 5/5 bilaterally  12: tongue protrudes midline    Motor:  5/5 throughout     DTRs: 2+ in bilateral biceps and patellar tendons    Tone: no spasticity noted    Proprioception:  Coordination: finger to nose, fine motor intact with fingers to thumb    RADIOLOGY:  MR 9/13  1.  No evidence of acute territorial infarct, intracranial hemorrhage or mass lesion.  2.  Moderate diffuse cerebral substance loss.  3.  Moderate microangiopathic ischemic change versus demyelination or gliosis.  4.  Chronic ischemic pontine gliosis.  5.  Small focus of chronic hemosiderin deposition within the left inferior frontal lobe likely related to remote hemorrhagic infarct or trauma.    LABS:  Recent Labs     09/23/20  0949 09/24/20  0739 09/25/20  0612   SODIUM 130* 127* 127*   POTASSIUM 3.7 4.5 4.5   CHLORIDE 90* 87* 90*   CO2 25 27 24   GLUCOSE 142* 116* 104*   BUN 22 20 31*   CREATININE 0.46* 0.59 0.86   CALCIUM 9.7 9.9 9.6     Recent Labs     09/23/20  0949 09/24/20  0739 09/25/20  0612   WBC 8.3 9.8 9.2   RBC 3.53* 3.56* 3.40*   HEMOGLOBIN 12.4* 12.7* 12.1*   HEMATOCRIT 36.6* 37.0* 34.7*   .7* 103.9* 102.1*   MCH 35.1* 35.7* 35.6*   MCHC 33.9 34.3 34.9   RDW 49.5 48.9 48.7   PLATELETCT 599* 641* 582*   MPV 10.0 9.7 10.0             PRIMARY REHAB DIAGNOSIS:    This patient is a 75 y.o. male admitted for acute inpatient rehabilitation with Acute encephalopathy.    IMPAIRMENTS:   Cognitive  ADLs/IADLs  Mobility    SECONDARY DIAGNOSIS/MEDICAL CO-MORBIDITIES AFFECTING FUNCTION:  Hypokalemia  Hyponatremia     RELEVANT CHANGES SINCE PREADMISSION EVALUATION:    Status unchanged    The patient's rehabilitation potential is Good  The patient's medical prognosis is good    PLAN:   Discussion and Recommendations:   1. The patient requires an acute inpatient rehabilitation program with a coordinated program of care at an intensity and frequency not available at a lower level of care. This recommendation is substantiated  by the patient's medical physicians who recommend that the patient's intervention and assessment of medical issues needs to be done at an acute level of care for patient's safety and maximum outcome.   2. A coordinated program of care will be supplied by an interdisciplinary team of physical therapy, occupational therapy, rehab physician, rehab nursing, and, if needed, speech therapy and rehab psychology. Rehab team presents a patient-specific rehabilitation and education program concentrating on prevention of future problems related to accessibility, mobility, skin, bowel, bladder, sexuality, and psychosocial and medical/surgical problems.   3. Need for Rehabilitation Physician: The rehab physician will be evaluating the patient on a multi-weekly basis to help coordinate the program of care. The rehab physician communicates between medical physicians, therapists, and nurses to maximize the patient's potential outcome. Specific areas in which the rehab physician will be providing daily assessment include the following:   A. Assessing the patient's heart rate and blood pressure response (vitals monitoring) to activity and making adjustments in medications or conservative measures as needed.   B. The rehab physician will be assessing the frequency at which the program can be increased to allow the patient to reach optimal functional outcome.   C. The rehab physician will also provide assessments in daily skin care, especially in light of patient's impairments in mobility.   D. The rehab physician will provide special expertise in understanding how to work with functional impairment and recommend appropriate interventions, compensatory techniques, and education that will facilitate the patient's outcome.   4. Rehab R.N.   The rehab RN will be working with patient to carry over in room mobility and activities of daily living when the patient is not in 3 hours of skilled therapy. Rehab nursing will be working in  conjunction with rehab physician to address all the medical issues above and continue to assess laboratory work and discuss abnormalities with the treating physicians, assess vitals, and response to activity, and discuss and report abnormalities with the rehab physician. Rehab RN will also continue daily skin care, supervise bladder/bowel program, instruct in medication administration, and ensure patient safety.   5. Rehab Therapy: Therapies to treat at intensity and frequency of (may change after completion of evaluation by all therapeutic disciplines):       PT:  Physical therapy to address mobility, transfer, gait training and evaluation for adaptive equipment needs 1 hour/day at least 5 days/week for the duration of the ELOS (see below)       OT:  Occupational therapy to address ADLs, self-care, home management training, functional mobility/transfers and assistive device evaluation, and community re-integration 1  hour/day at least 5 days/week for the duration of the ELOS (see below).        ST/Dysphagia:  Speech therapy to address speech, language, and cognitive deficits as well as swallowing difficulties with retraining/dysphagia management and community re-integration with comprehension, expression, cognitive training 1  hour/day at least 5 days/week for the duration of the ELOS (see below).     Medical management / Rehabilitation Issues/ Adverse Potential as part of rehabilitation plan     REHABILITATION ISSUES and MEDICAL CO-MORBIDITIES/ADVERSE POTENTIAL AFFECTING FUNCTION:    Acute encephalopathy  - Etiology hypertensive vs metabolic   - No stroke found   - EEG with encephalopathy, no seizure captured.   - Admit to IPR   - Evals by PT/OT and SLP on arrival depending on schedule     Pain  - Tylenol , lidoerm patch     PAF   - on eliquis, sotalol, amlodipine, apixaban, hydralazine, simvastatin     Pulm  - montelukast      Hyponatremia  - Na 127 on day of admission   - 1L fluid restriction   - Salt tabs 1g  TID     Hypokalemia  - Corrected to 4.5 on day of admission   - Nephrology will continue to follow and titrate   - High protein and high K diet     Thrombocytosis  - Plt 582 on admission   - monitor      Anemia:   -Hgb 12.1 on admission   - improving, continue to monitor     DVT PPX: eliquis     I personally performed a complete drug regimen review and no potential clinically significant medication issues were identified.     Pt was seen today for 75 min, and entire time spent in face-to-face contact was >50% in counseling and coordination of care as detailed in A/P above.        GOALS/EXPECTED LEVEL OF FUNCTION BASED ON CURRENT MEDICAL AND FUNCTIONAL STATUS (may change based on patient's medical status and rate of impairment recovery):  Transfers:   Supervision  Mobility/Gait:   Modified Independent  ADL's:   Supervision    DISPOSITION: Discharge to pre-morbid independent living setting with the supportive care of patient's spouse.      ELOS: 14-21    Franck Sosa DO   Physical Medicine and Rehabilitation   9/25/2020

## 2020-09-25 NOTE — DISCHARGE PLANNING
Nephrology has not medically cleared as of yet per Dr. Langford.  Msg placed to Dr. Fuller-seeking medical clearance. We have a Hospitalist as well to assist with her medical management.

## 2020-09-25 NOTE — THERAPY
"Physical Therapy   Daily Treatment     Patient Name: Jordan Alexander  Age:  75 y.o., Sex:  male  Medical Record #: [id number]  Today's Date: 9/25/2020     Precautions: Fall Risk, Swallow Precautions ( See Comments)    Assessment    Pt progressing well with functional mobility. Pt motivated to get better and participates well with PT.    Plan    Continue current treatment plan.    DC Equipment Recommendations: Unable to determine at this time  Discharge Recommendations: Recommend post-acute placement for additional physical therapy services prior to discharge home      Subjective    \"I gotta do this. I want to get out of here. The scenery is getting old.\"      Objective       09/25/20 1125   Cognition    Speech/ Communication Word Finding Impairment   Level of Consciousness Alert   Comments Pt cooperative and appropriate   Passive ROM Lower Body   Passive ROM Lower Body WDL   Active ROM Lower Body    Active ROM Lower Body  WDL   Strength Lower Body   Gross Strength Generalized Weakness, Equal Bilaterally   Balance   Standing Balance (Static) Fair +   Standing Balance (Dynamic) Fair   Weight Shift Standing Fair   Skilled Intervention Verbal Cuing;Sequencing   Comments with FWW   Gait Analysis   Gait Level Of Assist Minimal Assist   Assistive Device Front Wheel Walker   Distance (Feet) 250   Deviation Decreased Heel Strike;Decreased Toe Off   Skilled Intervention Verbal Cuing   Bed Mobility    Supine to Sit Supervised   Scooting Supervised   Skilled Intervention Verbal Cuing   Functional Mobility   Sit to Stand Supervised   Bed, Chair, Wheelchair Transfer Supervised   Mobility gait in s[place]   Skilled Intervention Verbal Cuing   Comments cues for UE use/placement during STS   How much help from another person does the patient currently need...   6 clicks Mobility Score 20   Short Term Goals    Short Term Goal # 3 B Pt will be SPV for transfers with LRAD in 6 txs to improve functional mobility.   Goal Outcome # 3 B Progressing " as expected   Short Term Goal # 4 B Pt will be SPV for gait > 200' with FWW to improve functional mobility.   Goal Outcome # 4 B Progressing as expected   Education Group   Role of Physical Therapist Patient Response Patient;Acceptance;Demonstration;Explanation;Action Demonstration;Verbal Demonstration;Reinforcement Needed   Anticipated Discharge Equipment and Recommendations   DC Equipment Recommendations Unable to determine at this time   Discharge Recommendations Recommend post-acute placement for additional physical therapy services prior to discharge home

## 2020-09-25 NOTE — CARE PLAN
Problem: Safety  Goal: Will remain free from injury  Outcome: PROGRESSING AS EXPECTED  Intervention: Provide assistance with mobility  Note: Assisted pt up to eob, pt tolerated well, free from fall     Problem: Pain Management  Goal: Pain level will decrease to patient's comfort goal  Outcome: PROGRESSING AS EXPECTED  Intervention: Follow pain managment plan developed in collaboration with patient and Interdisciplinary Team  Note: Pain assessed, resting and reposition implemented, pt resting in bed comfortably,

## 2020-09-25 NOTE — DISCHARGE PLANNING
Dr. Fuller has medically cleared and will be follow up @ Rehab as well.  Needs to continue strict I/O and a high protein diet.

## 2020-09-25 NOTE — DISCHARGE PLANNING
Dr. Langford has medically cleared.  Will discuss this case with the Admissions Team this morning.  Anticipate an admission pending Physiatry acceptance.

## 2020-09-25 NOTE — DISCHARGE PLANNING
Dr. Sosa has accepted Jordan.  Transport has been arranged for 1330.  Dr. Langford, Cynthia Spouse & Ayaka BSN are aware.  Msg placed to Yi PEREZ.

## 2020-09-25 NOTE — DISCHARGE INSTRUCTIONS
Discharge Instructions    Discharged to other by medical transportation with escort. Discharged via wheelchair, hospital escort: Yes.  Special equipment needed: Not Applicable    Be sure to schedule a follow-up appointment with your primary care doctor or any specialists as instructed.     Discharge Plan:   Diet Plan: Discussed  Activity Level: Discussed  Confirmed Symptoms Management: Discussed  Medication Reconciliation Updated: Yes    I understand that a diet low in cholesterol, fat, and sodium is recommended for good health. Unless I have been given specific instructions below for another diet, I accept this instruction as my diet prescription.   Other diet: dysphagia 1, level 4, NTL, 1-1 supervision, 1 L fluid restriction    Special Instructions: None    · Is patient discharged on Warfarin / Coumadin?   No     Depression / Suicide Risk    As you are discharged from this [facility] facility, it is important to learn how to keep safe from harming yourself.    Recognize the warning signs:  · Abrupt changes in personality, positive or negative- including increase in energy   · Giving away possessions  · Change in eating patterns- significant weight changes-  positive or negative  · Change in sleeping patterns- unable to sleep or sleeping all the time   · Unwillingness or inability to communicate  · Depression  · Unusual sadness, discouragement and loneliness  · Talk of wanting to die  · Neglect of personal appearance   · Rebelliousness- reckless behavior  · Withdrawal from people/activities they love  · Confusion- inability to concentrate     If you or a loved one observes any of these behaviors or has concerns about self-harm, here's what you can do:  · Talk about it- your feelings and reasons for harming yourself  · Remove any means that you might use to hurt yourself (examples: pills, rope, extension cords, firearm)  · Get professional help from the community (Mental Health, Substance Abuse, psychological  counseling)  · Do not be alone:Call your Safe Contact- someone whom you trust who will be there for you.  · Call your local CRISIS HOTLINE 136-1636 or 231-654-6412  · Call your local Children's Mobile Crisis Response Team [place] (097) 410-8184 or www.[employer]  · Call the toll free National Suicide Prevention Hotlines   · National Suicide Prevention Lifeline 565-690-WMCR (3109)  · [employer] 800-SUICIDE (828-3752)      Hyponatremia  Hyponatremia is when the amount of salt (sodium) in your blood is too low. When salt levels are low, your body may take in extra water. This can cause swelling throughout the body. The swelling often affects the brain.  What are the causes?  This condition may be caused by:  · Certain medical problems or conditions.  · Vomiting a lot.  · Having watery poop (diarrhea) often.  · Certain medicines or illegal drugs.  · Not having enough water in the body (dehydration).  · Drinking too much water.  · Eating a diet that is low in salt.  · Large burns on your body.  · Too much sweating.  What increases the risk?  You are more likely to get this condition if you:  · Have long-term (chronic) kidney disease.  · Have heart failure.  · Have a medical condition that causes you to have watery poop often.  · Do very hard exercises.  · Take medicines that affect the amount of salt is in your blood.  What are the signs or symptoms?  Symptoms of this condition include:  · Headache.  · Feeling like you may vomit (nausea).  · Vomiting.  · Being very tired (lethargic).  · Muscle weakness and cramps.  · Not wanting to eat as much as normal (loss of appetite).  · Feeling weak or light-headed.  Severe symptoms of this condition include:  · Confusion.  · Feeling restless (agitation).  · Having a fast heart rate.  · Passing out (fainting).  · Seizures.  · Coma.  How is this treated?  Treatment for this condition depends on the cause. Treatment may include:  · Getting fluids through  an IV tube that is put into one of your veins.  · Taking medicines to fix the salt levels in your blood. If medicines are causing the problem, your medicines will need to be changed.  · Limiting how much water or fluid you take in.  · Monitoring in the hospital to watch your symptoms.  Follow these instructions at home:    · Take over-the-counter and prescription medicines only as told by your doctor. Many medicines can make this condition worse. Talk with your doctor about any medicines that you are taking.  · Eat and drink exactly as you are told by your doctor.  ? Eat only the foods you are told to eat.  ? Limit how much fluid you take.  · Do not drink alcohol.  · Keep all follow-up visits as told by your doctor. This is important.  Contact a doctor if:  · You feel more like you may vomit.  · You feel more tired.  · Your headache gets worse.  · You feel more confused.  · You feel weaker.  · Your symptoms go away and then they come back.  · You have trouble following the diet instructions.  Get help right away if:  · You have a seizure.  · You pass out.  · You keep having watery poop.  · You keep vomiting.  Summary  · Hyponatremia is when the amount of salt in your blood is too low.  · When salt levels are low, you can have swelling throughout the body. The swelling mostly affects the brain.  · Treatment depends on the cause. Treatment may include getting IV fluids, medicines, or not drinking as much fluid.  This information is not intended to replace advice given to you by your health care provider. Make sure you discuss any questions you have with your health care provider.  Document Released: 08/29/2012 Document Revised: 03/05/2020 Document Reviewed: 11/21/2019  Elsevier Patient Education © 2020 [employer]

## 2020-09-25 NOTE — DISCHARGE SUMMARY
Discharge Summary    CHIEF COMPLAINT ON ADMISSION  Chief Complaint   Patient presents with   • Possible Stroke   • ALOC   • T-5000 FALL       Reason for Admission  Stroke IR     CODE STATUS  Full Code    HPI & HOSPITAL COURSE   75 y.o. male who presented 9/12/2020 with altered mental status.  Apparently the patient was transferred for evaluation of altered mental status that started in the morning progressively got worse was associated with some right-sided weakness. The patient was transferred as a code stroke he was evaluated by neurology and felt that his symptoms are likely related to hypertensive and possibly metabolic encephalopathy. Also found to have hyponatremia, potentially SIADH, nephrology consulted. Patient now on FR 1L/d and salt tabs.  Plan for discharge to rehab facility.    Patient has had 1 large dark bowel movement during his hospitalization, hemoglobin has remained stable, continue to monitor, consider discontinuing Eliquis if reoccurs.    Therefore, he is discharged in fair and stable condition to an inpatient rehabilitation hospital.    The patient met 2-midnight criteria for an inpatient stay at the time of discharge.      FOLLOW UP ITEMS POST DISCHARGE  As per rehab    DISCHARGE DIAGNOSES  Principal Problem:    Acute encephalopathy POA: Yes  Active Problems:    Hyponatremia POA: Yes    Hypokalemia POA: Yes    Dysphasia POA: Yes    Paroxysmal atrial fibrillation (HCC) POA: Yes    Hypomagnesemia POA: Yes    Macrocytic anemia POA: Yes    Back pain POA: Yes    Dyslipidemia POA: Yes  Resolved Problems:    * No resolved hospital problems. *      FOLLOW UP  No future appointments.  No follow-up provider specified.    MEDICATIONS ON DISCHARGE     Medication List      START taking these medications      Instructions   amLODIPine 10 MG Tabs  Commonly known as: NORVASC   Take 1 Tab by mouth every bedtime.  Dose: 10 mg     ferrous sulfate 325 (65 Fe) MG tablet  Start taking on: September 27, 2020   Take  "1 Tab by mouth every 48 hours.  Dose: 325 mg     hydrALAZINE 25 MG Tabs  Commonly known as: APRESOLINE   Take 1 Tab by mouth every 8 hours.  Dose: 25 mg     magnesium oxide 400 MG Tabs tablet  Commonly known as: MAG-OX   Take 1 Tab by mouth 2 Times a Day.  Dose: 400 mg     potassium chloride SA 10 MEQ Tbcr  Start taking on: September 26, 2020  Commonly known as: K-DUR   Take 1 Tab by mouth every day.  Dose: 10 mEq     sodium chloride 1 GM Tabs  Commonly known as: SALT   Take 1 Tab by mouth 3 times a day, with meals.  Dose: 1 g     thiamine 100 MG tablet  Start taking on: September 26, 2020  Commonly known as: THIAMINE   Take 1 Tab by mouth every day.  Dose: 100 mg        CONTINUE taking these medications      Instructions   acetaminophen 500 MG Tabs  Commonly known as: TYLENOL   Take 1,000 mg by mouth 2 times a day as needed for Moderate Pain.  Dose: 1,000 mg     Allegra Allergy 180 MG tablet  Generic drug: fexofenadine   Take 180 mg by mouth every day.  Dose: 180 mg     apixaban 5mg Tabs  Commonly known as: ELIQUIS   Take 5 mg by mouth 2 Times a Day.  Dose: 5 mg     budesonide-formoterol 160-4.5 MCG/ACT Aero  Commonly known as: SYMBICORT   Inhale 2 Puffs by mouth 2 times a day as needed (shortness of breath).  Dose: 2 Puff     Lidocaine 4 % Ptch   1 Patch by Apply externally route 1 time daily as needed (back pain).  Dose: 1 Patch     Lupron Depot (6-Month) 45 MG Kit kit  Generic drug: leuprolide acetate (6 Month)   45 mg by Intramuscular route every 6 months.  Dose: 45 mg     montelukast 10 MG Tabs  Commonly known as: SINGULAIR   Take 10 mg by mouth every day.  Dose: 10 mg     Phazyme 180 MG Caps  Generic drug: Simethicone   Take 180 mg by mouth every day. \"Phazyme\"  Dose: 180 mg     PreserVision AREDS 2 Caps   Take 1 Cap by mouth every day.  Dose: 1 Cap     simvastatin 20 MG Tabs  Commonly known as: ZOCOR   Take 20 mg by mouth every day.  Dose: 20 mg     sotalol 80 MG Tabs  Commonly known as: BETAPACE   Take "  mg by mouth 2 times a day. 80 mg in AM, 120 mg in PM  Dose:  mg        STOP taking these medications    ALPRAZolam 0.5 MG Tabs  Commonly known as: XANAX     metoprolol SR 25 MG Tb24  Commonly known as: TOPROL XL            Allergies  No Known Allergies    DIET  Orders Placed This Encounter   Procedures   • Diet Order Regular (Meds: Crush in puree)     Standing Status:   Standing     Number of Occurrences:   1     Order Specific Question:   Diet:     Answer:   Regular [1]     Comments:   Meds: Crush in puree     Order Specific Question:   Nutrient modifications:     Answer:   High Protein [4]     Order Specific Question:   Electrolyte modifications:     Answer:   High Potassium [4]     Order Specific Question:   Texture Modifier     Answer:   Level 4 - Pureed (Dysphagia 1)     Order Specific Question:   Liquid level     Answer:   Level 2 - Mildly Thick     Order Specific Question:   Consistency/Fluid modifications:     Answer:   1000 ml Fluid Restriction [7]     Order Specific Question:   Miscellaneous modifications:     Answer:   SLP - 1:1 Supervision by Nursing [21]     Order Specific Question:   Miscellaneous modifications:     Answer:   SLP - Deliver to Nursing Station [22]       ACTIVITY  As tolerated and directed by rehab.  Weight bearing as tolerated    LINES, DRAINS, AND WOUNDS  This is an automated list. Peripheral IVs will be removed prior to discharge.  Peripheral IV 09/18/20 22 G Anterior;Left Upper arm (Active)   Site Assessment Clean;Dry;Intact 09/25/20 0800   Dressing Type Transparent 09/25/20 0800   Line Status Scrubbed the hub prior to access;Flushed;Saline locked 09/25/20 0800   Dressing Status Clean;Dry;Intact 09/25/20 0800   Dressing Intervention N/A 09/25/20 0800   Date Primary Tubing Changed 09/19/20 09/19/20 0000   NEXT Primary Tubing Change  09/22/20 09/19/20 0000   Infiltration Grading ([facility], [facility]) 0 09/25/20 0800   Phlebitis Scale ([facility] Only) 0 09/25/20 0800     External  Urinary Catheter ([facility]) (Active)   Collection Container Standard drainage bag 09/24/20 2000   Output (mL) 100 mL 09/24/20 1300         Peripheral IV 09/18/20 22 G Anterior;Left Upper arm (Active)   Site Assessment Clean;Dry;Intact 09/25/20 0800   Dressing Type Transparent 09/25/20 0800   Line Status Scrubbed the hub prior to access;Flushed;Saline locked 09/25/20 0800   Dressing Status Clean;Dry;Intact 09/25/20 0800   Dressing Intervention N/A 09/25/20 0800   Date Primary Tubing Changed 09/19/20 09/19/20 0000   NEXT Primary Tubing Change  09/22/20 09/19/20 0000   Infiltration Grading ([facility], [facility]) 0 09/25/20 0800   Phlebitis Scale ([facility] Only) 0 09/25/20 0800               MENTAL STATUS ON TRANSFER  Level of Consciousness: Alert  Orientation : Oriented x 4  Speech: Speech Clear    CONSULTATIONS  Nephrology     PROCEDURES  NA    LABORATORY  Lab Results   Component Value Date    SODIUM 127 (L) 09/25/2020    POTASSIUM 4.5 09/25/2020    CHLORIDE 90 (L) 09/25/2020    CO2 24 09/25/2020    GLUCOSE 104 (H) 09/25/2020    BUN 31 (H) 09/25/2020    CREATININE 0.86 09/25/2020        Lab Results   Component Value Date    WBC 9.2 09/25/2020    HEMOGLOBIN 12.1 (L) 09/25/2020    HEMATOCRIT 34.7 (L) 09/25/2020    PLATELETCT 582 (H) 09/25/2020        Total time of the discharge process exceeds 40 minutes.

## 2020-09-25 NOTE — PREADMISSION SCREENING NOTE
Pre-Admission Screening Form    Patient Information:   Name: Jordan Tripp     MRN: [id number]       : 1945      Age: 75 y.o.   Gender: male      Race: White [7]       Marital Status:  [2]  Family Contact: CHIQUI TRIPP        Relationship: Spouse [17]  Home Phone:            Cell Phone: [phone]  Advanced Directives: None  Code Status:  FULL  Current Attending Provider: Thomas Langford M.D.  Referring Physician: Dr. Langford   Physiatrist Consult: Dr. Ramos    Referral Date: 20  Primary Payor Source:  MEDICARE  Secondary Payor Source:  [facility]    Medical Information:   Date of Admission to Acute Care [facility]  Room Number: S179/02  Rehabilitation Diagnosis: 0002.1 - Brain Dysfunction: Non-Traumatic    There is no immunization history on file for this patient.  No Known Allergies  No past medical history on file.  No past surgical history on file.    History Leading to Admission, Conditions that Caused the Need for Rehab (CMS):     Dr. Liane Eden H&P:  Chief Complaint   Patient presents with   • Possible Stroke   • ALOC   • T-5000 FALL         History of Presenting Illness  75 y.o. male who presented 2020 with altered mental status.  Apparently the patient was transferred for evaluation of altered mental status that started this morning progressively got worse was associated with some right-sided weakness that started around 11:05 a.m..  The patient was transferred as a code stroke he was evaluated by neurology and felt that his symptoms are likely related to hypertensive and possibly metabolic encephalopathy.  The patient is unable to provide any history and there is no family at the bedside information is obtained from review of available records and discussion with ED staff.  Apparently the patient sustained a fall 1 week ago details of his fall is not available.  He was significantly hypertensive on presentation with a systolic blood pressure of 232/112 and he was noted to have  hyponatremia.  He received labetalol.  At the time of my examination patient systolic blood pressure is154/82.  He is impulsive and agitated he is not answering any questions.  Apparently he is on Eliquis for history of atrial fibrillation.  He has no prior medical history in our system and is unable to participate in review of systems or give any further details about his medical history.  Assessment/Plan:  I anticipate this patient is appropriate for observation status at this time.     * Acute encephalopathy  Assessment & Plan  Etiology is not entirely clear patient evaluated by neurology not felt to be a TPA candidate  Possible hypertensive encephalopathy and some component of metabolic encephalopathy  CT head and CTA head and neck reviewed  Patient will be admitted for close clinical monitoring  We will check MRI of brain  His blood pressure is improved after IV labetalol will review and reorder his home meds and monitor blood pressure and order PRN labetalol  Avoid sedating agents  We will check urinalysis urine drug screen and ammonia level     Hypokalemia  Assessment & Plan  Replete and monitor     Dyslipidemia  Assessment & Plan  Continue simvastatin     Back pain  Assessment & Plan  Given recent fall will check x-ray     Paroxysmal atrial fibrillation (HCC)  Assessment & Plan  Continue Eliquis and sotalol     Hyponatremia  Assessment & Plan  Possibly contributing to his acute encephalopathy  IV hydration and monitor levels  Etiology is not entirely clear              Addendum:     I was able to reach patient's wife by phone and obtain further history.  Apparently he fell about 10 days ago and has been complaining of low back pain and had a mild contusion to his scalp.  His confusion started this morning initially with difficulty finding words and not following commands and progressively got worse.  She reports that he had a similar episode a few years ago and had a stroke work-up which was negative and his  symptoms resolved spontaneously.  She confirms that he has A. fib and has been taking Eliquis and sotalol and metoprolol.  She reports that he has chronic hyponatremia and that his sodium is usually in the 120s.  She reports that his blood pressure is usually under control.        Dr. Ramos (Physiatry) recommendations:  ASSESSMENT:  Patient is a 75 y.o. male admitted with AMS that was progressively worsening with right sided weakness. Per neurology, likely due to hypertension and possibly metabolic encephalopathy. MRI negative for stroke and EEG with encephalopathy, no seizure captured.         #Rehab:   -Vitals: stable   -Insurance: Medicare/[facility]  -Discharge support: spouse  -Restraints DC'ed on 9/24, monitor without   -Patient with severe cog deficits per SLP note on 9/23, will require 24/7 supervision on discharge; TCC will reach out to verify   -When medically cleared and discharge support verified, anticipate he will meet criteria for IRF   -Rehab Impairment Code: 0002.1 - Brain Dysfunction: Non-Traumatic  -Reason for admission: Acute encephalopathy        #Neuro: acute encephalopathy, possibly from HTN and/or metabolic; no evidence of acute infection or stroke. EEG with encephalopathy, no seizure captured.   -restraints DC’ed 9/24     #Pain: Tylenol , lidoerm patch, sotalol     #CV: PAF on eliquis, sotalol, amlodipine, apixaban, hydralazine, simvastatin     #Pulm: montelukast      #Hyponatremia: Na 127 on 9/24 <= 130 on 9/24     #Hypokalemia: K 4.5 on 9/24 <= 3.7 on 9/23     #Thrombocytosis: Plt 641 on 9/24 <= 599 on 9/23     #Anemia: Hgb 12.7 on 9/24 <= 12.4 on 9/23     #Renal:   -nephrology 9/24: hold NS/salt tabs, consider low dose loop diuretic     #Supp: MVI      #Bowel: 1x on 9/22, senna s     #Bladder: external condom cath     #DVT PPX: occult feces x1 positive on 9/22, no recurrent melena; natasha Alanis (Nephrology) recommendations:  IMPRESSION:  - Hyponatremia    * Urine sodium elevated and  initially with elevated urine osm as well 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 but since better with IVF possibly a mixed, unclear cause but he is complaining of some abdominal pain and joint pain    * Patient is euvolemic on exam with low serum osm    * However, other things to consider include cerebral salt wasting but patient generally would be hypovolemic on exam, with more UOP    * Sodium better with higher rate if NS IVF    * Agree with increase NS to 100cc/hr, can decrease or hold if sodium decreases    * ?Fanconi given elevated urine glucose with no history of diabetes  - Proteinuria    * Given slightly elevated urine protein and anemia will send for SPEP/ UPEP MATTIE    * Small blood in urine likely from enrique, don't suspect GN    * Can repeat UA when enrique is removed  - Acute Encephalopathy    * Unclear cause thought to be from hyponatremia    * Continue management per primary team and neurology  - Anemia    * Anemia of chronic disease    * Epo not indicated    * Ferritin 550  - Hypokalemia    * Possibly from diuretics and hypomagnesemia    * Hold diuretics for now continue with repletion  - Hypomagnesemia    * Resolved    * Replete PRN  - Paroxysmal Atrial Fibrillation    * management per primary team  - HTN    * More recently blood pressure is in a better range    * Target goal per neurology  - Dyslipidemia  - Back Pain     PLAN:  - NS as above, decrease rate if sodium decreases  - Continue salt tablets 2g TID  - Accurate I/Os while on tube feeds  - Avoid nephrotoxic agents  - Follow up SPEP/UPEP, light chains  - Replete potassium PRN     Dr. Sanders (Neurology) recommendations:  Assessment/Plan:  75 y.o. male with alteration of mental status, etiology likely metabolic encephalopathy.  He has profound hyponatremia in low 120s.  Likely SIADH.  Nephrology has been consulted and patient was started on salt tablets and restriction of free water.  Would suggest gentle correction of hyponatremia to avoid central pontine  myelinolysis.  Correct sodium by about 10% within first 24 hours.  Sodium check every 6 hours to monitor and avoid rapid correction.      Sanford Killian M.D.   Physician   Neurology   Procedures   Signed   Date of Service:  9/14/2020  2:06 PM            Procedure Orders   EEG [[id number]] ordered by Ann-Marie Alberto M.D. at 09/14/20 1032               []Hide copied text    []Hover for details  ROUTINE ELECTROENCEPHALOGRAM REPORT        Referring provider: Dr. Alberto.      DOS: 9/14/2020 (total recording of 28 minutes)     INDICATION:  Jordan Alexander 75 y.o. male presenting with altered mental status, right-sided weakness.     CURRENT ANTIEPILEPTIC REGIMEN: Lorazepam.     TECHNIQUE: 30 channel routine electroencephalogram (EEG) was performed in accordance with the international 10-20 system. The study was reviewed in bipolar and referential montages. The recording examined the patient during wakeful and drowsy state(s).      DESCRIPTION OF THE RECORD:  During the wakefulness, the background showed a symmetrical 7 hz theta activity posteriorly with amplitude of 70 mV on the right, with attenuation of the cerebral electrical activity on the left.  There was reactivity to eye closure/opening.  A normal anterior-posterior gradient was noted with faster beta frequencies seen anteriorly.  During drowsiness, theta/delta frequencies were seen.     ACTIVATION PROCEDURES:   Intermittent Photic stimulation was performed in a stepwise fashion from 1 to 30 Hz, but failed to produce any background changes.        ICTAL AND/OR INTERICTAL FINDINGS:   There was attenuation of the cerebral electrical activity on the left hemisphere.  No clinical events or seizures were reported or recorded during the study.      EKG: sampling of the EKG recording demonstrated sinus rhythm.         INTERPRETATION:  This is an abnormal routine EEG recording in the awake and drowsy states.  A mild encephalopathy is suggested, which is nonspecific.  There was  attenuation of the cerebral electrical activity on the left hemisphere, to suggest an underlying structural abnormality.  No seizures captured during the study.  Clinical and radiological correlation is recommended.     Note: This EEG does not rule out epilepsy.  If the clinical suspicion remains high for seizures, a prolonged recording to capture clinical or subclinical events may be helpful.           Sanford Killian MD   Epilepsy and Neurodiagnostics.   Clinical  of Neurology [facility].   Diplomate in Neurology, Epilepsy, and Electrodiagnostic Medicine.   Office: [phone]  Fax: [phone]        Brain MRI 09-13-20:  1.  No evidence of acute territorial infarct, intracranial hemorrhage or mass lesion.  2.  Moderate diffuse cerebral substance loss.  3.  Moderate microangiopathic ischemic change versus demyelination or gliosis.  4.  Chronic ischemic pontine gliosis.  5.  Small focus of chronic hemosiderin deposition within the left inferior frontal lobe likely related to remote hemorrhagic infarct or trauma.    Co-morbidities: See PMH  Potential Risk - Complications: Aphasia, Cognitive Impairment, Contractures, Deep Vein Thrombosis, Dysphagia, Incontinence, Malnutrition, Pain, Perceptual Impairment, Pneumonia, Pressure Ulcer, Seizures and Urinary Tract Infection  Level of Risk: High    Ongoing Medical Management Needed (Medical/Nursing Needs):   Patient Active Problem List    Diagnosis Date Noted   • Dysphasia 09/20/2020     Priority: High   • Acute encephalopathy 09/12/2020     Priority: High   • Hyponatremia 09/12/2020     Priority: High   • Hypokalemia 09/12/2020     Priority: High   • Back pain 09/12/2020     Priority: Low   • Dyslipidemia 09/12/2020     Priority: Low   • Macrocytic anemia 09/19/2020   • Hypomagnesemia 09/16/2020   • Paroxysmal atrial fibrillation (HCC) 09/12/2020     Alert with periods of forgetfulness, confusion and being  "impulsive.    Current Vital Signs:   Temperature: 36.2 °C (97.2 °F) Pulse: 66 Respiration: 18 Blood Pressure : 114/51  Weight: 80.4 kg (177 lb 4 oz) Height: 170.2 cm (5' 7.01\")  Pulse Oximetry: 95 % O2 (LPM): 0      Completed Laboratory Reports:  Recent Labs     09/22/20  1011 09/22/20 2058 09/23/20  0949 09/24/20  0739 09/25/20  0612   WBC 9.3  --  8.3 9.8 9.2   HEMOGLOBIN 12.4*  --  12.4* 12.7* 12.1*   HEMATOCRIT 36.3*  --  36.6* 37.0* 34.7*   PLATELETCT 614*  --  599* 641* 582*   SODIUM 131* 128* 130* 127* 127*   POTASSIUM 4.2 4.2 3.7 4.5 4.5   BUN 18 27* 22 20 31*   CREATININE 0.57 0.69 0.46* 0.59 0.86   GLUCOSE 111* 101* 142* 116* 104*     Additional Labs: Not Applicable    Prior Living Situation:   Housing / Facility: Unable To Determine At This Time  Lives with - Patient's Self Care Capacity: Spouse(Erasmo)  Equipment Owned: Unable to Determine At This Time    Prior Level of Function / Living Situation:   Physical Therapy: Prior Services: Home-Independent  Housing / Facility: Unable To Determine At This Time  Equipment Owned: Unable to Determine At This Time  Lives with - Patient's Self Care Capacity: Spouse(Jan)  Bed Mobility: Independent  Transfer Status: Independent  Ambulation: Independent  Assistive Devices Used: Unable to Determine At This Time  Stairs: Unable To Determine At This Time  Current Level of Function:   Gait Level Of Assist: Minimal Assist  Assistive Device: Front Wheel Walker  Distance (Feet): 220  Deviation: Decreased Toe Off, Decreased Heel Strike, Bradykinetic  # of Stairs Climbed: 0  Skilled Intervention: Verbal Cuing, Tactile Cuing, Sequencing, Facilitation  Supine to Sit: Minimal Assist  Sit to Supine: (left up in chair )  Scooting: Supervised  Skilled Intervention: Verbal Cuing  Comments: HOB elevated, pt using rails  Sit to Stand: Minimal Assist  Bed, Chair, Wheelchair Transfer: Minimal Assist  Transfer Method: Stand Step  Skilled Intervention: Verbal Cuing, Tactile Cuing, " Facilitation  Sitting Edge of Bed: 10 min  Standing: 10 min total  Occupational Therapy:      Prior Services: Home-Independent  Housing / Facility: Unable To Determine At This Time  Occupation (Pre-Hospital Vocational): Retired Due To Age  Current Level of Function:   Eating: (NPO)  Upper Body Dressing: Minimal Assist  Lower Body Dressing: Moderate Assist  Toileting: Maximal Assist  Skilled Intervention: Verbal Cuing, Sequencing  Comments: sequencing for ADLs  Speech Language Pathology:   Problem List: Dysphagia, Cognitive-Linguistic Deficits  Diet / Liquid Recommendation: Mildly Thick (2) - (Nectar Thick)  Comments: Inconsistently timed swallow with 6-12 second delay and unable to follow directives for protective maneuvers.  Risk of descending penetration/aspiration.    Rehabilitation Prognosis/Potential: Fair  Estimated Length of Stay: 14-21 days    Nursing:   Orientation : Oriented x 4  Incontinent-external cath in place    Scope/Intensity of Services Recommended:  Physical Therapy: 1 hr / day  5 days / week. Therapeutic Interventions Required: Maximize Endurance, Mobility, Strength and Safety  Occupational Therapy: 1 hr / day 5 days / week. Therapeutic Interventions Required: Maximize Self Care, ADLs, IADLs and Energy Conservation  Speech & Language Pathology: 1 hr / day 5 days / week. Therapeutic Interventions Required: Maximize Cognition, Swallowing and Safety  Rehabilitation Nursin/7. Therapeutic Interventions Required: Monitor Pain, Skin, Vital Signs, Intake and Output, Labs, Safety, Aspiration Risk and Family Training  Rehabilitation Physician: 3 - 5 days / week. Therapeutic Interventions Required: Medical Management    He requires 24-hour rehabilitation nursing to manage bowel and bladder function, skin care, nutrition and fluid intake, pain control, safety, medication management and patient/family goals. In addition, rehabilitation nursing will reiterate and reinforce therapy skills and equipment  use, including ADLs, as well as provide education to the patient and family. Jordan Alexander is willing to participate in and is able to tolerate the proposed plan of care.    Rehabilitation Goals and Plan (Expected frequency & duration of treatment in the IRF):   Return to the Community, Modified Independent Level of Care and Family Able to Provide 24/7 Assistance  Anticipated Date of Rehabilitation Admission: 09-25-20  Patient/Family oriented IRF level of care/facility/plan: Yes  Patient/Family willing to participate in IRF care/facility/plan: Yes  Patient able to tolerate IRF level of care proposed: Yes  Patient has potential to benefit IRF level of care proposed: Yes  Comments: Not Applicable    Special Needs or Precautions - Medical Necessity:  Safety Concerns/Precautions:  Fall Risk / High Risk for Falls, Balance, Cognition and Bed / Chair Alarm  Pain Management  IV Site: Peripheral  Current Medications:    Current Facility-Administered Medications Ordered in Epic   Medication Dose Route Frequency Provider Last Rate Last Dose   • potassium chloride SA (Kdur) tablet 20 mEq  20 mEq Oral DAILY Jovanna Fuller D.O.   20 mEq at 09/25/20 0509   • hydrALAZINE (APRESOLINE) tablet 25 mg  25 mg Oral Q8HRS Jovanna Fuller, D.O.   25 mg at 09/25/20 0509   • ferrous sulfate tablet 325 mg  325 mg Oral Q48HRS Thomas Langford M.D.   325 mg at 09/25/20 0817   • amLODIPine (NORVASC) tablet 10 mg  10 mg Oral QHS Thomas Langford M.D.   10 mg at 09/24/20 2117   • sotalol (BETAPACE) tablet 80 mg  80 mg Oral TWICE DAILY Thomas Langford M.D.   80 mg at 09/25/20 0509   • apixaban (ELIQUIS) tablet 2.5 mg  2.5 mg Oral BID Thomas Langford M.D.   2.5 mg at 09/25/20 0509   • thiamine tablet 100 mg  100 mg Oral DAILY Emily John M.D.   100 mg at 09/25/20 0509   • acetaminophen (TYLENOL) tablet 500 mg  500 mg Oral 4X/DAY Emily John M.D.   500 mg at 09/25/20 0817   • simvastatin (ZOCOR) tablet 20 mg  20 mg Oral Q EVENING  Emily John M.D.   20 mg at 09/24/20 1811   • senna-docusate (PERICOLACE or SENOKOT S) 8.6-50 MG per tablet 2 Tab  2 Tab Oral BID Emily John M.D.   Stopped at 09/24/20 1800    And   • polyethylene glycol/lytes (MIRALAX) PACKET 1 Packet  1 Packet Oral QDAY PRN Emily John M.D.        And   • bisacodyl (DULCOLAX) suppository 10 mg  10 mg Rectal QDAY PRN Emily John M.D.       • montelukast (SINGULAIR) tablet 10 mg  10 mg Oral DAILY Emily John M.D.   10 mg at 09/25/20 0509   • labetalol (NORMODYNE/TRANDATE) injection 10 mg  10 mg Intravenous Q4HRS PRN Angelo Eden M.D.   10 mg at 09/19/20 1745   • budesonide-formoterol (SYMBICORT) 160-4.5 MCG/ACT inhaler 2 Puff  2 Puff Inhalation BID PRN Angelo Eden M.D.       • lidocaine (LIDODERM) 5 % 1 Patch  1 Patch Transdermal QDAY PRN Angelo Eden M.D.   1 Patch at 09/23/20 1257     No current [facility]-ordered outpatient medications on file.     Diet:   DIET ORDERS (From admission to next 24h)     Start     Ordered    09/21/20 0959  Diet Order Regular (Meds: Crush in puree)  ALL MEALS     Question Answer Comment   Diet: Regular Meds: Crush in puree   Nutrient modifications: High Protein    Electrolyte modifications: High Potassium    Texture Modifier Level 4 - Pureed (Dysphagia 1)    Liquid level Level 2 - Mildly Thick    Miscellaneous modifications: SLP - 1:1 Supervision by Nursing    Miscellaneous modifications: SLP - Deliver to Nursing Station        09/21/20 0958                Anticipated Discharge Destination / Patient/Family Goal:  Destination: Home with Assistance Support System: Spouse and Family   Anticipated home health services: OT, PT and SLP  Previously used HH service/ provider: Not Applicable  Anticipated DME Needs: Walker, Wheelchair and Life Line  Outpatient Services: OT, PT and SLP  Alternative resources to address additional identified needs:     Pre-Screen Completed: 9/25/2020 8:29 AM Sameer García,  FRANDY.

## 2020-09-25 NOTE — DISCHARGE PLANNING
Anticipated Disposition:  [facility]    Action:  Notified by IFEOMA Estrella from [facility] that patient accepted by Dr. Cutler and bed available today. Transport confirmed for 1330 today.  Pt and spouse agreeable.  Verbal for Cobra obtained.    Verbal for Cobra obtained from Dr. Langford.    Cobra given to bedside RN Ayaka.    Barrier:  None    Plan:  DC

## 2020-09-26 LAB
25(OH)D3 SERPL-MCNC: 26 NG/ML (ref 30–100)
ALBUMIN SERPL BCP-MCNC: 3.8 G/DL (ref 3.2–4.9)
APPEARANCE UR: CLEAR
BACTERIA #/AREA URNS HPF: ABNORMAL /HPF
BILIRUB UR QL STRIP.AUTO: NEGATIVE
BUN SERPL-MCNC: 41 MG/DL (ref 8–22)
CALCIUM SERPL-MCNC: 9.4 MG/DL (ref 8.5–10.5)
CHLORIDE SERPL-SCNC: 92 MMOL/L (ref 96–112)
CO2 SERPL-SCNC: 23 MMOL/L (ref 20–33)
COLOR UR: YELLOW
CREAT SERPL-MCNC: 1.1 MG/DL (ref 0.5–1.4)
EPI CELLS #/AREA URNS HPF: NEGATIVE /HPF
GLUCOSE SERPL-MCNC: 161 MG/DL (ref 65–99)
GLUCOSE UR STRIP.AUTO-MCNC: NEGATIVE MG/DL
HYALINE CASTS #/AREA URNS LPF: ABNORMAL /LPF
KAPPA LC FREE SER-MCNC: 28.62 MG/L (ref 3.3–19.4)
KAPPA LC FREE/LAMBDA FREE SER NEPH: 1.74 {RATIO} (ref 0.26–1.65)
KETONES UR STRIP.AUTO-MCNC: ABNORMAL MG/DL
LAMBDA LC FREE SERPL-MCNC: 16.49 MG/L (ref 5.71–26.3)
LEUKOCYTE ESTERASE UR QL STRIP.AUTO: NEGATIVE
MICRO URNS: ABNORMAL
NITRITE UR QL STRIP.AUTO: NEGATIVE
PH UR STRIP.AUTO: 6.5 [PH] (ref 5–8)
PHOSPHATE SERPL-MCNC: 3.7 MG/DL (ref 2.5–4.5)
POTASSIUM SERPL-SCNC: 4.5 MMOL/L (ref 3.6–5.5)
PREALB SERPL-MCNC: 19.1 MG/DL (ref 18–38)
PROT UR QL STRIP: 30 MG/DL
RBC # URNS HPF: ABNORMAL /HPF
RBC UR QL AUTO: NEGATIVE
SODIUM SERPL-SCNC: 129 MMOL/L (ref 135–145)
SP GR UR STRIP.AUTO: 1.03
UROBILINOGEN UR STRIP.AUTO-MCNC: 1 MG/DL
WBC #/AREA URNS HPF: ABNORMAL /HPF

## 2020-09-26 PROCEDURE — 80069 RENAL FUNCTION PANEL: CPT

## 2020-09-26 PROCEDURE — 84134 ASSAY OF PREALBUMIN: CPT

## 2020-09-26 PROCEDURE — 82306 VITAMIN D 25 HYDROXY: CPT

## 2020-09-26 PROCEDURE — A9270 NON-COVERED ITEM OR SERVICE: HCPCS | Performed by: NURSE PRACTITIONER

## 2020-09-26 PROCEDURE — 700102 HCHG RX REV CODE 250 W/ 637 OVERRIDE(OP): Performed by: NURSE PRACTITIONER

## 2020-09-26 PROCEDURE — 97530 THERAPEUTIC ACTIVITIES: CPT

## 2020-09-26 PROCEDURE — A9270 NON-COVERED ITEM OR SERVICE: HCPCS | Performed by: PHYSICAL MEDICINE & REHABILITATION

## 2020-09-26 PROCEDURE — 700102 HCHG RX REV CODE 250 W/ 637 OVERRIDE(OP): Performed by: PHYSICAL MEDICINE & REHABILITATION

## 2020-09-26 PROCEDURE — 92523 SPEECH SOUND LANG COMPREHEN: CPT

## 2020-09-26 PROCEDURE — 92610 EVALUATE SWALLOWING FUNCTION: CPT

## 2020-09-26 PROCEDURE — 97166 OT EVAL MOD COMPLEX 45 MIN: CPT

## 2020-09-26 PROCEDURE — 97161 PT EVAL LOW COMPLEX 20 MIN: CPT

## 2020-09-26 PROCEDURE — 36415 COLL VENOUS BLD VENIPUNCTURE: CPT

## 2020-09-26 PROCEDURE — 770010 HCHG ROOM/CARE - REHAB SEMI PRIVAT*

## 2020-09-26 PROCEDURE — 97535 SELF CARE MNGMENT TRAINING: CPT

## 2020-09-26 RX ORDER — VITAMIN B COMPLEX
4000 TABLET ORAL DAILY
Status: DISCONTINUED | OUTPATIENT
Start: 2020-09-26 | End: 2020-10-06 | Stop reason: HOSPADM

## 2020-09-26 RX ADMIN — CHOLECALCIFEROL TAB 25 MCG (1000 UNIT) 4000 UNITS: 25 TAB at 14:26

## 2020-09-26 RX ADMIN — Medication 1 G: at 11:35

## 2020-09-26 RX ADMIN — Medication 1 G: at 09:29

## 2020-09-26 RX ADMIN — HYDRALAZINE HYDROCHLORIDE 25 MG: 25 TABLET, FILM COATED ORAL at 14:25

## 2020-09-26 RX ADMIN — DOCUSATE SODIUM 50 MG AND SENNOSIDES 8.6 MG 2 TABLET: 8.6; 5 TABLET, FILM COATED ORAL at 09:29

## 2020-09-26 RX ADMIN — APIXABAN 2.5 MG: 5 TABLET, FILM COATED ORAL at 20:54

## 2020-09-26 RX ADMIN — APIXABAN 2.5 MG: 5 TABLET, FILM COATED ORAL at 09:30

## 2020-09-26 RX ADMIN — HYDRALAZINE HYDROCHLORIDE 25 MG: 25 TABLET, FILM COATED ORAL at 05:12

## 2020-09-26 RX ADMIN — SOTALOL HYDROCHLORIDE 80 MG: 80 TABLET ORAL at 17:45

## 2020-09-26 RX ADMIN — SIMVASTATIN 20 MG: 20 TABLET, FILM COATED ORAL at 20:55

## 2020-09-26 RX ADMIN — ACETAMINOPHEN 500 MG: 500 TABLET, FILM COATED ORAL at 20:54

## 2020-09-26 RX ADMIN — THIAMINE HCL TAB 100 MG 100 MG: 100 TAB at 09:30

## 2020-09-26 RX ADMIN — ACETAMINOPHEN 500 MG: 500 TABLET, FILM COATED ORAL at 17:45

## 2020-09-26 RX ADMIN — OXYCODONE HYDROCHLORIDE 2.5 MG: 5 TABLET ORAL at 09:29

## 2020-09-26 RX ADMIN — SOTALOL HYDROCHLORIDE 80 MG: 80 TABLET ORAL at 05:12

## 2020-09-26 RX ADMIN — ACETAMINOPHEN 500 MG: 500 TABLET, FILM COATED ORAL at 09:29

## 2020-09-26 RX ADMIN — MONTELUKAST 10 MG: 10 TABLET, FILM COATED ORAL at 09:29

## 2020-09-26 RX ADMIN — Medication 1 G: at 17:45

## 2020-09-26 RX ADMIN — ACETAMINOPHEN 500 MG: 500 TABLET, FILM COATED ORAL at 11:35

## 2020-09-26 ASSESSMENT — BRIEF INTERVIEW FOR MENTAL STATUS (BIMS)
WHAT MONTH IS IT: ACCURATE WITHIN 5 DAYS
ASKED TO RECALL BED: YES, AFTER CUEING (A PIECE OF FURNITURE")"
WHAT MONTH IS IT: ACCURATE WITHIN 5 DAYS
ASKED TO RECALL BED: YES, NO CUE REQUIRED
ASKED TO RECALL BLUE: NO, COULD NOT RECALL
ASKED TO RECALL SOCK: YES, NO CUE REQUIRED
ASKED TO RECALL SOCK: NO, COULD NOT RECALL
ASKED TO RECALL BLUE: YES, AFTER CUEING (A COLOR")"
WHAT DAY OF THE WEEK IS IT: CORRECT
WHAT YEAR IS IT: CORRECT
BIMS SUMMARY SCORE: 13
BIMS SUMMARY SCORE: 11
INITIAL REPETITION OF BED BLUE SOCK - FIRST ATTEMPT: 3
WHAT YEAR IS IT: CORRECT
WHAT DAY OF THE WEEK IS IT: CORRECT
INITIAL REPETITION OF BED BLUE SOCK - FIRST ATTEMPT: 3

## 2020-09-26 ASSESSMENT — ACTIVITIES OF DAILY LIVING (ADL)
BED_CHAIR_WHEELCHAIR_TRANSFER_DESCRIPTION: SET-UP OF EQUIPMENT;SUPERVISION FOR SAFETY;VERBAL CUEING;INCREASED TIME
BED_CHAIR_WHEELCHAIR_TRANSFER_DESCRIPTION: INCREASED TIME;SUPERVISION FOR SAFETY;VERBAL CUEING
TUB_SHOWER_TRANSFER_DESCRIPTION: GRAB BAR;SHOWER BENCH;INCREASED TIME;SET-UP OF EQUIPMENT;SUPERVISION FOR SAFETY;VERBAL CUEING
TOILET_TRANSFER_DESCRIPTION: GRAB BAR;SET-UP OF EQUIPMENT;SUPERVISION FOR SAFETY;VERBAL CUEING
TOILETING: INDEPENDENT
TOILETING_LEVEL_OF_ASSIST_DESCRIPTION: GRAB BAR;INCREASED TIME;SET-UP OF EQUIPMENT;SUPERVISION FOR SAFETY;VERBAL CUEING

## 2020-09-26 ASSESSMENT — GAIT ASSESSMENTS
DISTANCE (FEET): 150
GAIT LEVEL OF ASSIST: CONTACT GUARD ASSIST

## 2020-09-26 ASSESSMENT — PAIN DESCRIPTION - PAIN TYPE: TYPE: ACUTE PAIN

## 2020-09-26 NOTE — PROGRESS NOTES
[facility] Daily Progress Note    Date of Service  9/26/2020    Chief Complaint  Hyponatremia    Interval Problem Update  HISTORY OF PRESENT ILLNESS:    Per report as patient is not able to give history, patient is a 75 y.o. male who presented 9/12/2020 with altered mental status.  Apparently the patient was transferred for evaluation of altered mental status a few days ago progressively got worse was associated with some right-sided weakness that started in the morning.  Also reported to have a fall a week prior to presentation.  The patient was transferred as a code stroke he was evaluated by neurology and felt that his symptoms are likely related to hypertensive and possibly metabolic encephalopathy.  Etiology of encephalopathy was unclear thought maybe metabolic ?hyponatremia as cause.  EEG with no seizures, no evidence of infection.  Imaging with no evidence of acute infarct.     Nephrology consulted for hyponatremia.       Patient seen at bedside was AOx3 per nurse but now sleeping from [facility].     Daily Nephrology Summary  9/18 - Consult done  9/19 - Patient was started on NS at 100cc/hr last night.  Sodium fluctuating between 120 and 124.  Complains of joint pain and abdominal pain.  9/20 - Not alert to place.  Still with slight abdominal pain.  Denies nausea.  No SOB.  9/21 - alert and oriented x 3 this AM, answers questions appropriately  9/22 - alert and oriented x 3, Na and K improved  9/23 - alert, oriented, Mg low at 1.4  9/24 - poor PO intake, Na decreased, K and Mg wnl, uric acid 3.6, patient slightly confused  9/25 - I/O not well recorded, Na unchanged at 127, K 4.5, Abebe 101 and UOsm 623---off salt tabs/diuretics/saline  9/26 - Now at [facility] as transferred yesterday. No labs done today. Renal panel ordered stat earlier today, but still not done. RN in room now drawing blood. Patient mildly confused, but pleasant. States he feels good.        Review of Systems  ROS    Constitutional: Negative for chills and fever.   HENT: Negative for congestion, ear pain and nosebleeds.    Eyes: Negative for pain and discharge.   Respiratory: Negative for cough and shortness of breath.    Cardiovascular: Negative for chest pain and leg swelling.   Gastrointestinal: Negative for abdominal pain, diarrhea, nausea and vomiting.   Genitourinary: Negative for dysuria, frequency, hematuria and urgency.   Musculoskeletal: Negative for back pain and myalgias.   Skin: Negative for itching and rash.   Neurological: Negative for focal weakness and headaches.   Endo/Heme/Allergies: Negative for environmental allergies and polydipsia.   Psychiatric/Behavioral: The patient is not nervous/anxious and does not have insomnia.           Physical Exam  Temp:  [35.9 °C (96.7 °F)-36.9 °C (98.5 °F)] 36.9 °C (98.5 °F)  Pulse:  [60-73] 65  Resp:  [16-18] 16  BP: (104-119)/(48-56) 104/48  SpO2:  [94 %-96 %] 94 %    Physical Exam  Constitutional:       General: He is not in acute distress.  HENT:      Head: Normocephalic. Atraumatic. No abrasion.      Right Ear: External ear normal.      Left Ear: External ear normal.      Nose: Nose normal.      Mouth/Throat:      Mouth: Mucous membranes are dry.      Pharynx: Oropharynx is clear.   Eyes:      General: No scleral icterus.        Right eye: No discharge.         Left eye: No discharge.      Extraocular Movements: Extraocular movements intact.   Neck:      Musculoskeletal: Neck supple. No neck rigidity or muscular tenderness.   Cardiovascular:      Rate and Rhythm: Normal rate.   Pulmonary:      Effort: Pulmonary effort is normal.      Breath sounds: Normal breath sounds.   Abdominal:      General: Abdomen is flat.      Palpations: Abdomen is soft. NDNT. +BS.  Musculoskeletal:      Right lower leg: No edema.      Left lower leg: No edema.   Skin:     General: Skin is warm and dry.   Neurological:      General: No focal deficit present.      Mental Status: He is alert and  oriented to person, place, and time.   Psychiatric:         Mood and Affect: Mood normal.         Behavior: Behavior normal.     Fluids    Intake/Output Summary (Last 24 hours) at 9/26/2020 1258  Last data filed at 9/26/2020 1200  Gross per 24 hour   Intake 660 ml   Output 450 ml   Net 210 ml       Laboratory  Recent Labs     09/24/20  0739 09/25/20  0612   WBC 9.8 9.2   RBC 3.56* 3.40*   HEMOGLOBIN 12.7* 12.1*   HEMATOCRIT 37.0* 34.7*   .9* 102.1*   MCH 35.7* 35.6*   MCHC 34.3 34.9   RDW 48.9 48.7   PLATELETCT 641* 582*   MPV 9.7 10.0     Recent Labs     09/24/20  0739 09/25/20  0612   SODIUM 127* 127*   POTASSIUM 4.5 4.5   CHLORIDE 87* 90*   CO2 27 24   GLUCOSE 116* 104*   BUN 20 31*   CREATININE 0.59 0.86   CALCIUM 9.9 9.6                 Imaging       Assessment/Plan   IMPRESSION:  - Hyponatremia    * Etiology initially appeared mixed, initial urine lytes/osm collected after patient had been provided IVF, unclear reliability.     * TSH wnl, protein wnl; on leupron, but no documented h/o recent prostate procedure; prealbumin low, K low, uric acid 3.6, BUN wnl/near normal; CO2 wnl, hypokalemia present since admission now corrected      * Na initially improved with IVF, then decreased again, has been labile; he has also received lasix; recently started on salt tabs, subsequently DC, but now back on salt tabs TID    Uric acid <4     * Patient is euvolemic on exam with mildly reduced serum osm, no hypotension, Abebe 101 and UOsm 623 (off NS, diuretics, salt)---findings now c/w SIADH      - Proteinuria    * Given slightly elevated urine protein and anemia checked SPEP/ UPEP     * Small blood in urine likely from enrique, don't suspect GN    * Can repeat UA when enrique is removed     - Acute Encephalopathy    * Unclear etiology, initially thought to be associated with hyponatremia and HTN, doubt associated with current mild/moderate hyponatremia that has been relatively stable    * Continue management per primary  team and neurology     - Anemia    * Anemia of chronic disease    * Ferritin 550, %sat 9     - Hypokalemia    * present since admission, may have worsened with diuretics use    * Hold diuretics     * Keep K >4, not on supplement currently, will re-eval need when lab results come back 9/26     - Hypomagnesemia    * Replete PRN, ordered PO Mg supplement 9/25, but not on it currently, will recheck mag 9/27    * Keep Mg >2    - Vitamin D Deficiency, level 26    * Start Vit D-3, 4000iu po daily     - Paroxysmal Atrial Fibrillation    * management per primary team, on sotalol and metoprolol     - HTN    * goal <140/90     - Dyslipidemia     - Back Pain--none today     - hyperglycemia (mild), A1c 5.6     PLAN:     - Fluid restriction to 1L daily, continue 1g salt tab TID with meals, may need to consider low dose loop diuretic  - No lab results today, and not currently on supplements as ordered yesterday, keep K>4, Mg>2  - Will likely need KCl and Mag supplements, but no lab results, so will re-eval after resulted  - Strict I/O  - Start Vit D-3, 4000iu po daily  - High protein diet, high potassium diet  - Avoid nephrotoxic agents  - SPEP with restriction of protein migration in the gamma region.  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 FLC pending.  - Continue amlodipine 10mg qhs  - check renal panel qam  - continue hydralazine 25mq [id number], increase as needed     PMH/PSH/FH/SH/meds/labs reviewed        Other management per primary service

## 2020-09-26 NOTE — THERAPY
"Occupational Therapy   Initial Evaluation     Patient Name: Jordan Alexander  Age:  75 y.o., Sex:  male  Medical Record #: [id number]  Today's Date: 9/26/2020     Subjective    \"I don't know where I live right now. Sometimes I'm in [place] and sometimes I'm in [place]. I know I'm in [place] currently, but I don't know which house I'm living in.\"    \"I'm here because I fell.\" OTR could not find documentation of a fall at home prior to adm, however, could be true.    \"I'm in so much pain that I can't move. Well if I just sit here it will go away. I can't lift my head up without the pain increasing.\" Pt verbalizing high pain, but facial expressions or body language not similar to that of people who are in such high pain that they can't get dressed. Pt distracted and then was able to finish getting dressed without c/o pain.    \"Good morning!... (pt then whispered) Oh no, I think it is actually afternoon.\"    Objective       09/26/20 0701   Prior Living Situation   Prior Services Home-Independent   Housing / Facility 1 Story House  (H&P says 2 story. Pt ericka historian.)   Steps Into Home 2   Steps In Home 0   Bathroom Set up Walk In Shower   Equipment Owned None   Lives with - Patient's Self Care Capacity Spouse   Comments Pt ericka historian. Began talking about several different houses. He is unsure if he lives in [facility] or [place]. States he lives half time in both, but isn't sure which seasons.   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 " " )   Leisure Interests Sports  (Golfing)   Prior Functioning: Everyday Activities   Self Care Independent   Indoor Mobility (Ambulation) Independent   Stairs Independent   Functional Cognition Independent   Prior Device Use None of the given options   Pain   Intervention Distraction;Emotional Support;Repositioned   Pain 0 - 10 Group   Location Neck   Pain Rating Scale (NPRS) 6   Comfort Goal Comfort with Movement;Perform Activity   Cognition    Level of Consciousness Alert   Ability To Follow Commands 1 Step   Safety Awareness Impaired   New Learning Impaired   Attention Impaired   Sequencing Impaired   Comments Forgets where he lives at times. Difficulty answering PLOF questions. VCs required to wash all body parts.    Cognitive Pattern Assessment   Cognitive Pattern Assessment Used BIMS   Brief Interview for Mental Status (BIMS)   Repetition of Three Words (First Attempt) 3   Temporal Orientation: Year Correct   Temporal Orientation: Month Accurate within 5 days   Temporal Orientation: Day Correct   Recall: \"Sock\" No, could not recall   Recall: \"Blue\" No, could not recall   Recall: \"Bed\" Yes, no cue required   BIMS Summary Score 11   Active ROM Upper Body   Active ROM Upper Body  WDL   Dominant Hand Right   Strength Upper Body   Rt Shoulder Flexion Strength 5 (N)   Rt Shoulder Extension Strength 5 (N)   Rt Elbow Flexion Strength 5 (N)   Rt Elbow Extension Strength 5 (N)   Right    (WFL)   Lt Shoulder Flexion Strength 5 (N)   Lt Shoulder Extension Strength 5 (N)   Lt Elbow Flexion Strength 5 (N)   Lt Elbow Extension Strength 5 (N)   Left    (WFL)   Eating   Assistance Needed Supervision;Verbal cues;Set-up / clean-up   Physical Assistance Level No physical assistance   CARE Score 4   Eating Discharge Goal   Discharge Goal Independent   Oral Hygiene   Assistance Needed Verbal cues   Physical Assistance Level No physical assistance   Comment in seated; pt stands at baseline   CARE Score 4   Oral " Hygiene Discharge Goal   Discharge Goal Independent   Shower/Bathe Self   Assistance Needed Physical assistance;Supervision;Set-up / clean-up;Verbal cues   Physical Assistance Level Less than 25%   Comment freq cueing to wash all body parts; washed self and then stated pain was too high to dry self. Willing to participate with encouragement   CARE Score 3   Shower/Bathe Self Discharge Goal   Discharge Goal Independent   Upper Body Dressing   Assistance Needed Set-up / clean-up;Supervision   Physical Assistance Level No physical assistance   CARE Score 4   Upper Body Dressing Discharge Goal   Discharge Goal Independent   Lower Body Dressing   Assistance Needed Physical assistance;Supervision;Set-up / clean-up;Verbal cues   Physical Assistance Level 25%-49%   Comment due to pain   CARE Score 3   Lower Body Dressing Discharge Goal   Discharge Goal Independent   Putting On/Taking Off Footwear   Assistance Needed Supervision;Verbal cues;Set-up / clean-up   Physical Assistance Level No physical assistance   CARE Score 4   Putting On/Taking Off Footwear Discharge Goal   Discharge Goal Independent   Toileting Hygiene   Assistance Needed Incidental touching;Supervision;Set-up / clean-up;Adaptive equipment   Physical Assistance Level No physical assistance   CARE Score 4   Toileting Hygiene Discharge Goal   Discharge Goal Independent   Toilet Transfer   Assistance Needed Incidental touching;Supervision;Verbal cues;Set-up / clean-up;Adaptive equipment   Physical Assistance Level No physical assistance   CARE Score 4   Toilet Transfer Discharge Goal   Discharge Goal Independent   Hearing, Speech, and Vision   Expression of Ideas and Wants Some difficulty   Understanding Verbal and Non-Verbal Content Usually understands   Functional Level of Assist   Eating Stand by Assist   Eating Description Increased time;Verbal cueing;Supervision for safety;Modified diet   Grooming Stand by Assist;Seated   Grooming Description Increased  time;Supervision for safety;Verbal cueing   Bathing Minimal Assist   Bathing Description   (cues for body parts; limited in drying 2/2 pain)   Upper Body Dressing Stand by Assist   Upper Body Dressing Description Set-up of equipment;Supervision for safety   Lower Body Dressing Moderate Assist   Lower Body Dressing Description Increased time;Set-up of equipment;Supervision for safety;Verbal cueing;Grab bar;Reacher  (limited by neck pain)   Toileting Contact Guard Assist   Toileting Description Grab bar;Increased time;Set-up of equipment;Supervision for safety;Verbal cueing  (VCs for thoroughness in hygiene)   Bed, Chair, Wheelchair Transfer Minimal Assist   Bed Chair Wheelchair Transfer Description Set-up of equipment;Supervision for safety;Verbal cueing;Increased time   Toilet Transfers Contact Guard Assist   Toilet Transfer Description Grab bar;Set-up of equipment;Supervision for safety;Verbal cueing   Tub / Shower Transfers Contact Guard Assist   Tub Shower Transfer Description Grab bar;Shower bench;Increased time;Set-up of equipment;Supervision for safety;Verbal cueing   Problem List   Problem List Decreased Active Daily Living Skills;Decreased Homemaking Skills;Decreased Functional Mobility;Decreased Activity Tolerance;Safety Awareness Deficits / Cognition;Impaired Cognitive Function;Impaired Postural Control / Balance   Precautions   Precautions Fall Risk;Swallow Precautions ( See Comments)   Current Discharge Plan   Current Discharge Plan Return to Prior Living Situation   Benefit    Therapy Benefit Patient Would Benefit from Inpatient Rehab Occupational Therapy to Maximize [place] with ADLs, IADLs and Functional Mobility.   Interdisciplinary Plan of Care Collaboration   IDT Collaboration with  Nursing;Speech Therapist   Patient Position at End of Therapy Seated;Self Releasing Lap Belt Applied;Call Light within Reach;Tray Table within Reach;Phone within Reach   Collaboration Comments re: pain and handoff  of care   Equipment Needs   Assistive Device / DME Shower Chair;Grab Bars In Shower / Tub;Grab Bars By Toilet   Strengths & Barriers   Strengths Adequate strength;Independent prior level of function;Pleasant and cooperative;Willingly participates in therapeutic activities   Barriers Confused;Decreased endurance;Impaired activity tolerance;Impaired balance;Impaired carryover of learning;Impaired insight/denial of deficits;Impaired functional cognition   OT Total Time Spent   OT Individual Total Time Spent (Mins) 60   OT Charge Group   OT Self Care / ADL 1   OT Evaluation OT Evaluation Mod       Assessment  Patient is 75 y.o. male with a diagnosis of encephalopathy. Per MD, could be caused by hypertension and metabolic. Additional factors influencing patient status / progress (ie: cognitive factors, co-morbidities, social support, etc): past medical history of PAF, DL, anemia. Pt with impairments to cognition, safety awareness, balance, and endurance impacting safety and independence with I/ADLs.     Plan  Recommend Occupational Therapy  minutes per day 5-7 days per week for 14-21 days for the following treatments:  OT Group Therapy, OT Self Care/ADL, OT Cognitive Skill Dev, OT Community Reintegration, OT Manual Ther Technique, OT Neuro Re-Ed/Balance, OT Therapeutic Activity, OT Evaluation and OT Therapeutic Exercise.    Goals:  Long term and short term goals have been discussed with patient and they are in agreement.    Occupational Therapy Goals     Problem: Bathing     Dates: Start: 09/26/20       Goal: STG-Within one week, patient will bathe     Dates: Start: 09/26/20       Description: 1) Individualized Goal:  with supervision and remember to wash all body parts with only fading cues  2) Interventions:  OT Self Care/ADL, OT Cognitive Skill Dev, OT Neuro Re-Ed/Balance, OT Therapeutic Activity, OT Evaluation, and OT Therapeutic Exercise                  Problem: Dressing     Dates: Start: 09/26/20       Goal:  STG-Within one week, patient will dress LB     Dates: Start: 09/26/20       Description:  1) Individualized Goal:  with supervision   2) Interventions:  OT Self Care/ADL, OT Cognitive Skill Dev, OT Neuro Re-Ed/Balance, OT Therapeutic Activity, OT Evaluation, and OT Therapeutic Exercise                  Problem: OT Long Term Goals     Dates: Start: 09/26/20       Goal: LTG-By discharge, patient will complete basic self care tasks     Dates: Start: 09/26/20       Description:  1) Individualized Goal:  with mod I  2) Interventions:  OT Self Care/ADL, OT Cognitive Skill Dev, OT Neuro Re-Ed/Balance, OT Therapeutic Activity, OT Evaluation, and OT Therapeutic Exercise          Goal: LTG-By discharge, patient will perform bathroom transfers     Dates: Start: 09/26/20       Description:  1) Individualized Goal:  with mod I  2) Interventions:  OT Self Care/ADL, OT Cognitive Skill Dev, OT Neuro Re-Ed/Balance, OT Therapeutic Activity, OT Evaluation, and OT Therapeutic Exercise                Problem: Toileting     Dates: Start: 09/26/20       Goal: STG-Within one week, patient will complete toileting tasks     Dates: Start: 09/26/20       Description:  1) Individualized Goal:  with distant supervision   2) Interventions:  OT Self Care/ADL, OT Cognitive Skill Dev, OT Neuro Re-Ed/Balance, OT Therapeutic Activity, OT Evaluation, and OT Therapeutic Exercise

## 2020-09-26 NOTE — PROGRESS NOTES
Received shift report and assumed care of patient.  Patient awake, calm and stable, currently positioned in bed for comfort and safety; call light within reach.  4/10 neck pain at this time will give pain medication see mar.  Will continue to monitor.

## 2020-09-26 NOTE — THERAPY
"Physical Therapy   Initial Evaluation     Patient Name: Jordan Alexander  Age:  75 y.o., Sex:  male  Medical Record #: [id number]  Today's Date: 9/26/2020     Subjective    Patient in bed and agreeable to PT evaluation.    \"I'm having a good day today, I'm feeling about 90%.\"     Objective       09/26/20 1401   Prior Living Situation   Prior Services Home-Independent   Housing / Facility 1 Story House   Steps Into Home 0   Steps In Home 0   Rail None   Elevator No   Bathroom Set up Walk In Shower  (potentially tub/shower in [place])   Equipment Owned Grab Bar(s) In Tub / Shower   Lives with - Patient's Self Care Capacity Spouse   Comments lives ~May-October in [place], remainder of year in [facility] [place]. [place] home is 2 story however lives on first floor (2 VIOLET with B grab bars in garage) and has corner seats in shower. Home in [facility] is 1 story with no VIOLET, has grab bars x2 by shower   Prior Level of Functional Mobility   Bed Mobility Independent   Transfer Status Independent   Ambulation Independent   Distance Ambulation (Feet) 1000   Assistive Devices Used None   Wheelchair Other (Comments)  (n/a)   Stairs Independent   Prior Functioning: Everyday Activities   Self Care Independent   Indoor Mobility (Ambulation) Independent   Stairs Independent   Functional Cognition Independent   Prior Device Use None of the given options   Vitals   Pulse 64   Patient BP Position Supine   Blood Pressure  130/42   Pulse Oximetry 97 %   O2 Delivery Device None - Room Air   Vitals Comments post-ambulation 136/61 mmHg, 69 bpm, 100% on RA   Passive ROM Lower Body   Passive ROM Lower Body WDL   Active ROM Lower Body    Active ROM Lower Body  WDL   Strength Lower Body   Lower Body Strength  X   Comments BLEs grossly 4- to 4/5   Sensation Lower Body   Lower Extremity Sensation   WDL   Comments denies changes in sensation however reports baseline fluctuating tingling in BLE toes. intact to light touch, tactile localization, and " bilateral simultaneous stimulation   Lower Body Muscle Tone   Lower Body Muscle Tone  WDL   Balance Assessment   Sitting Balance (Static) Good   Sitting Balance (Dynamic) Good   Standing Balance (Static) Fair -   Standing Balance (Dynamic) Poor +   Bed Mobility    Supine to Sit Stand by Assist   Sit to Supine Stand by Assist   Sit to Stand Contact Guard Assist   Scooting Stand by Assist   Rolling Supervised   Neurological Concerns   Neurological Concerns No   Coordination Lower Body    Coordination Lower Body  WDL   Roll Left and Right   Assistance Needed Supervision   Physical Assistance Level No physical assistance   CARE Score 4   Roll Left and Right Discharge Goal   Discharge Goal Independent   Sit to Lying   Assistance Needed Supervision   Physical Assistance Level No physical assistance   CARE Score 4   Sit to Lying Discharge Goal   Discharge Goal Independent   Lying to Sitting on Side of Bed   Assistance Needed Supervision   Physical Assistance Level No physical assistance   CARE Score 4   Lying to Sitting on Side of Bed Discharge Goal   Discharge Goal Independent   Sit to Stand   Assistance Needed Physical assistance   Physical Assistance Level Less than 25%   CARE Score 3   Sit to Stand Discharge Goal   Discharge Goal Independent   Chair/Bed-to-Chair Transfer   Assistance Needed Physical assistance   Physical Assistance Level Less than 25%   CARE Score 3   Chair/Bed-to-Chair Transfer Discharge Goal   Discharge Goal Independent   Car Transfer   Reason if not Attempted Refused to perform   CARE Score 7   Car Transfer Discharge Goal   Discharge Goal Supervision or touching assistance   Walk 10 Feet   Assistance Needed Physical assistance   Physical Assistance Level Less than 25%   CARE Score 3   Walk 10 Feet Discharge Goal   Discharge Goal Independent   Walk 50 Feet with Two Turns   Assistance Needed Physical assistance   Physical Assistance Level Less than 25%   CARE Score 3   Walk 50 Feet with Two Turns  Discharge Goal   Discharge Goal Independent   Walk 150 Feet   Assistance Needed Physical assistance   Physical Assistance Level Less than 25%   CARE Score 3   Walk 150 Feet Discharge Goal   Discharge Goal Independent   Walking 10 Feet on Uneven Surfaces   Reason if not Attempted Safety concerns   CARE Score 88   Walking 10 Feet on Uneven Surfaces Discharge Goal   Discharge Goal Independent   1 Step (Curb)   Reason if not Attempted Safety concerns   CARE Score 88   1 Step (Curb) Discharge Goal   Discharge Goal Independent   4 Steps   Reason if not Attempted Refused to perform   CARE Score 7   4 Steps Discharge Goal   Discharge Goal Supervision or touching assistance   12 Steps   Reason if not Attempted Refused to perform   CARE Score 7   12 Steps Discharge Goal   Discharge Goal Supervision or touching assistance   Picking Up Object   Reason if not Attempted Safety concerns   CARE Score 88   Picking Up Object Discharge Goal   Discharge Goal Supervision or touching assistance   Wheel 50 Feet with Two Turns   Assistance Needed Supervision   Physical Assistance Level No physical assistance   CARE Score 4   Type of Wheelchair/Scooter Manual   Wheel 50 Feet with Two Turns Discharge Goal   Discharge Goal Independent   Wheel 150 Feet   Assistance Needed Physical assistance   Physical Assistance Level Less than 25%   CARE Score 3   Type of Wheelchair/Scooter Manual   Wheel 150 Feet Discharge Goal   Discharge Goal Independent   Gait Functional Level of Assist    Gait Level Of Assist Contact Guard Assist   Assistive Device None   Distance (Feet) 150   # of Times Distance was Traveled 1   Deviation   (general instability, limited by lightheadedness)   Wheelchair Functional Level of Assist   Wheelchair Assist Supervised   Distance Wheelchair (Feet or Distance) 50   Wheelchair Description Extra time;Leg rest management;Limited by fatigue;Safety concerns;Supervision for safety;Verbal cueing   Stairs Functional Level of Assist    Level of Assist with Stairs Refused   Transfer Functional Level of Assist   Bed, Chair, Wheelchair Transfer Contact Guard Assist   Bed Chair Wheelchair Transfer Description Increased time;Supervision for safety;Verbal cueing  (stand step transfer with no AD)   Problem List    Problems Impaired Bed Mobility;Impaired Transfers;Impaired Ambulation;Functional Strength Deficit;Impaired Balance;Decreased Activity Tolerance;Safety Awareness Deficits / Cognition   Precautions   Precautions Fall Risk   Comments impaired cognition   Current Discharge Plan   Current Discharge Plan Return to Prior Living Situation  (spouse prefers returning to [facility])   Interdisciplinary Plan of Care Collaboration   IDT Collaboration with  Family / Caregiver;Nursing;Occupational Therapist   Patient Position at End of Therapy In Bed;Call Light within Reach;Tray Table within Reach;Phone within Reach;Family / Friend in Room   Collaboration Comments CLOF, POC   Benefit   Therapy Benefit Patient Would Benefit from Inpatient Rehabilitation Physical Therapy to Maximize Functional [place] with ADLs, IADLs and Mobility.   Strengths & Barriers   Strengths Able to follow instructions;Adequate strength;Good insight into deficits/needs;Independent prior level of function;Motivated for self care and independence;Pleasant and cooperative;Supportive family;Willingly participates in therapeutic activities   Barriers Decreased endurance;Impaired activity tolerance;Impaired balance  (lightheadedness)   PT Total Time Spent   PT Individual Total Time Spent (Mins) 60   PT Charge Group   PT Therapeutic Activities 1   PT Evaluation PT Evaluation Low       Assessment  Patient is 75 y.o. male with a diagnosis of acute encephalopaty. He presented to [facility] on 9/12/2020 with AMS and R sided weakness. Stroke workup was negative, determined to have encephalopathy, with etiology likely hypertensive and possibly metabolic. Hospitalization complicated by hyponatremia,  SIADH, anemia, hypokalemia, thrombocytosis, and paroxysmal Afib. PMH includes: PAF, dyslipidemia, anemia, history of falls most recently in February resulting in L rib fx.  Additional factors influencing patient status / progress (ie: cognitive factors, co-morbidities, social support, etc): Patient is very motivated to return to prior level of independence without a device. Per spouse patient was fairly active however did not often perform stairs; she also reports that he has several episodes of lightheadedness which has contributed to his fall history. Plan to return home to [facility] d/c here if possible. Potential barriers include medical comorbidities as well as cognition. Primary barriers include decreased balance and endurance, as well as impaired safety awareness. Patient will benefit from skilled physical therapy to address current impairments and maximize functional mobility and safety prior to discharge.        Plan  Recommend Physical Therapy  minutes per day 5-7 days per week for 10-14 days for the following treatments:  PT Group Therapy, PT Gait Training, PT Therapeutic Exercises, PT Neuro Re-Ed/Balance, PT Therapeutic Activity and PT Evaluation.    Goals:  Long term and short term goals have been discussed with patient and spouse and they are in agreement.    Physical Therapy Problems     Problem: Balance     Dates: Start: 09/26/20       Goal: STG-Within one week, patient will     Dates: Start: 09/26/20       Description: 1) Individualized goal:  tolerate Jimenez Balance Scale  2) Interventions:  PT Group Therapy, PT Gait Training, PT Therapeutic Exercises, PT Neuro Re-Ed/Balance, PT Therapeutic Activity, and PT Evaluation                  Problem: Mobility     Dates: Start: 09/26/20       Goal: STG-Within one week, patient will ambulate community distances     Dates: Start: 09/26/20       Description: 1) Individualized goal: 150 ft with no AD and supervision.  2) Interventions:  PT Group  Therapy, PT Gait Training, PT Therapeutic Exercises, PT Neuro Re-Ed/Balance, PT Therapeutic Activity, and PT Evaluation          Goal: STG-Within one week, patient will ambulate up/down a curb     Dates: Start: 09/26/20       Description: 1) Individualized goal: with BUE support and CGA.  2) Interventions:  PT Group Therapy, PT Gait Training, PT Therapeutic Exercises, PT Neuro Re-Ed/Balance, PT Therapeutic Activity, and PT Evaluation                Problem: Mobility Transfers     Dates: Start: 09/26/20       Goal: STG-Within one week, patient will perform bed mobility     Dates: Start: 09/26/20       Description: 1) Individualized goal: independently.  2) Interventions:  PT Group Therapy, PT Gait Training, PT Therapeutic Exercises, PT Neuro Re-Ed/Balance, PT Therapeutic Activity, and PT Evaluation          Goal: STG-Within one week, patient will transfer bed to chair     Dates: Start: 09/26/20       Description: 1) Individualized goal: with no AD and supervision.  2) Interventions:  PT Group Therapy, PT Gait Training, PT Therapeutic Exercises, PT Neuro Re-Ed/Balance, PT Therapeutic Activity, and PT Evaluation                Problem: PT-Long Term Goals     Dates: Start: 09/26/20       Goal: LTG-By discharge, patient will ambulate     Dates: Start: 09/26/20       Description: 1) Individualized goal: 200 ft with no AD mod I.  2) Interventions:  PT Group Therapy, PT Gait Training, PT Therapeutic Exercises, PT Neuro Re-Ed/Balance, PT Therapeutic Activity, and PT Evaluation          Goal: LTG-By discharge, patient will transfer one surface to another     Dates: Start: 09/26/20       Description: 1) Individualized goal: with no AD mod I.  2) Interventions:  PT Group Therapy, PT Gait Training, PT Therapeutic Exercises, PT Neuro Re-Ed/Balance, PT Therapeutic Activity, and PT Evaluation          Goal: LTG-By discharge, patient will perform home exercise program     Dates: Start: 09/26/20       Description: 1) Individualized  goal: with handout mod I.  2) Interventions:  PT Group Therapy, PT Gait Training, PT Therapeutic Exercises, PT Neuro Re-Ed/Balance, PT Therapeutic Activity, and PT Evaluation          Goal: LTG-By discharge, patient will transfer in/out of a car     Dates: Start: 09/26/20       Description: 1) Individualized goal: with no AD and supervision.  2) Interventions:  PT Group Therapy, PT Gait Training, PT Therapeutic Exercises, PT Neuro Re-Ed/Balance, PT Therapeutic Activity, and PT Evaluation          Goal: LTG-By discharge, patient will     Dates: Start: 09/26/20       Description: 1) Individualized goal: improve Jimenez Balance Scale by >= 5 points to achieve MDC.  2) Interventions:  PT Group Therapy, PT Gait Training, PT Therapeutic Exercises, PT Neuro Re-Ed/Balance, PT Therapeutic Activity, and PT Evaluation          Goal: LTG-By discharge, patient will     Dates: Start: 09/26/20       Description: 1) Individualized goal: negotiate 1-2 steps with BUE support and supervision.  2) Interventions:  PT Group Therapy, PT Gait Training, PT Therapeutic Exercises, PT Neuro Re-Ed/Balance, PT Therapeutic Activity, and PT Evaluation

## 2020-09-26 NOTE — THERAPY
"Speech Language Pathology   Initial Assessment     Patient Name: Jordan Alexander  AGE:  75 y.o., SEX:  male  Medical Record #: [id number]  Today's Date: 9/26/2020     Subjective    Pt pleasant and cooperative during evaluation.  Pain was limiting factor.  Pt presented with increase pain toward end of session, and SCCAN was not fully completed.       Objective       09/26/20 0801   Prior Living Situation   Prior Services Home-Independent   Housing / Facility 1 Story House   Lives with - Patient's Self Care Capacity Spouse   Prior Level Of Function   Communication Within Functional Limits   Swallow Within Functional Limits   Dentition Intact   Dentures Lowers;Partials;Uppers  (partial lower, full upper)   Hearing Within Functional Limits for Evaluation   Hearing Aid None   Vision Wears Corrective Lenses;Reading ;Distance   Patient's Primary Language English   Education High School Graduate or GED   Occupation (Pre-Hospital Vocational) Retired Due To Age   Receptive Language / Auditory Comprehension   Understands Simple, Structured Conversation  Within Functional Limits (6-7)   Understands Complex Conversation Supervision (5)   Expressive Language   Naming Within Functional Limits (6-7)   Word Finding Deficits Minimal (4)   Written Language Expression   Dominant Hand Right   Cognition   Moderate Attention Moderate (3)   Verbal Short Term Memory 5 Minutes   Auditory Math Supervision (5)   Functional Math / Financial Management Minimal (4)   Cognitive Pattern Assessment   Cognitive Pattern Assessment Used BIMS   Brief Interview for Mental Status (BIMS)   Repetition of Three Words (First Attempt) 3   Temporal Orientation: Year Correct   Temporal Orientation: Month Accurate within 5 days   Temporal Orientation: Day Correct   Recall: \"Sock\" Yes, no cue required   Recall: \"Blue\" Yes, after cueing (\"a color\")   Recall: \"Bed\" Yes, after cueing (\"a piece of furniture\")   BIMS Summary Score 13   Social / Pragmatic Communication "   Social / Pragmatic Communication WDL   Tracheostomy   Tracheostomy No   Speech Mechanisms / Voice Production   Speech Mechanisms / Voice Production (WDL) WDL   Labial Function   Labial Function (WDL) WDL   Lingual Function   Lingual Function (WDL) WDL   Velar Function   Velar Function (WDL) WDL   Laryngeal Function   Voice Quality Within Functional Limits   Volutional Cough Within Functional Limits   Swallowing   Thick Nectar / Honey / Liquid Within Functional Limits   Pureed (4) Within Functional Limits   Thin Liquid Minimal   Masticated Foods Minimal   Dysphagia Strategies / Recommendations   Strategies / Interventions Recommended (Yes / No) Yes   Compensatory Strategies Alternate Solids / Liquids;Single Sips / Bites;Multiple Swallows;No Talking During Eating / Drinking;Monitor During Meals   Diet / Liquid Recommendation Puree (4);Mildly Thick (2) - (Nectar Thick)   Medication Administration  Crush all Medications in Puree   Swallowing/Nutritional Status   Swallowing/Nutritional Status Modified food consistency   Eating   Assistance Needed Verbal cues   Physical Assistance Level No physical assistance   CARE Score 4   Eating Discharge Goal   Discharge Goal Independent   Functional Level of Assist   Eating Stand by Assist   Eating Description Set-up of equipment or meal/tube feeding;Verbal cueing;Increased time   Comprehension Supervision   Comprehension Description Glasses;Verbal cues   Expression Minimal Assist   Expression Description Verbal cueing   Social Interaction Independent   Problem Solving Minimal Assist   Problem Solving Description Therapy schedule;Verbal cueing;Bed/chair alarm;Seat belt   Memory Moderate Assist   Memory Description Therapy schedule;Supervision;Verbal cueing;Increased time;Bed/chair alarm   Outcome Measures   Outcome Measures Utilized SCCAN   SCCAN (Scales of Cognitive and Communicative Ability for Neurorehabilitation)   Oral Expression - Raw Score 16   Oral Expression - Scale  Performance Score 84   Orientation - Raw Score 12   Orientation - Scale Performance Score 100   Memory - Raw Score 10   Memory - Scale Performance Score 53   Speech Comprehension - Raw Score 12   Speech Comprehension - Scale Performance Score 92   Problem List   Problem List Cognitive-Linguistic Deficits;Dysphagia   Current Discharge Plan   Current Discharge Plan Return to Prior Living Situation   Benefit   Therapy Benefit Patient would benefit from Inpatient Rehab Speech-Language Pathology to address above identified deficits.   Interdisciplinary Plan of Care Collaboration   IDT Collaboration with  Nursing   Collaboration Comments diet recommendations   Strengths & Barriers   Strengths Able to follow instructions;Independent prior level of function;Pleasant and cooperative;Motivated for self care and independence;Willingly participates in therapeutic activities   Barriers Aspiration risk;Impaired functional cognition   Speech Language Pathologist Assigned   Assigned SLP / Extension CW 60 tdine (NO SPLIT)    SLP Total Time Spent   SLP Individual Total Time Spent (Mins) 90   Evaluation Charges   SLP Speech Language Evaluation Speech Sound Language Comprehension   SLP Oral Pharyngeal Evaluation Oral Pharyngeal Evaluation       Assessment    Patient is 75 y.o. male with a diagnosis of acute encephalopathy.  Additional factors influencing patient status/progress (ie: cognitive factors, co-morbidities, social support, etc): Bedside swallow evaluation completed.  Oral motor examination revealed adequate strength, and ROM, for all structures.  Pt has good fitting upper denture, and partial lower denture.  Pt tolerated level 4 (puree) and level 2 (NTL) without oral residue or s/s of aspiration.  Pt reported sensation of globus following level 6 (SBS) trial (peaches).  Pt presented with delayed cough x2 of 5 trials level 0 (thin) by cup.  Recommend MBS to fully assess swallow function.  Recommend tdine.  Recommend continue  level 4 (puree) level 2 (NTL) meds crushed with puree.      Speech language evaluation: SCCAN was initiated.  Pt presented with deficits in short term memory, word finding, and attn to detail.  Further goals to be added upon completion of the SCCAN.  Recommend skilled speech therapy to target aforementioned deficits.      Plan  Recommend Speech Therapy 30-60 minutes per day 5-6 days per week for 4 weeks for the following treatments:  SLP Aphasia Evaluation, SLP Speech Language Treatment, SLP Swallowing Dysfunction Treatment, SLP Oral Pharyngeal Evaluation, SLP Video Swallow Evaluation, SLP Cognitive Skill Development and SLP Group Treatment.    Goals:  Long term and short term goals have been discussed with patient and they are in agreement.    Speech Therapy Problems     Problem: Memory STGs     Dates: Start: 09/26/20       Goal: STG-Within one week, patient will     Dates: Start: 09/26/20       Description: 1) Individualized goal:  Recall daily events, and safety strategies with use of external aid, 80% Jo-Ann.   2) Interventions:  SLP Cognitive Skill Development                    Problem: Problem Solving STGs     Dates: Start: 09/26/20       Goal: STG-Within one week, patient will     Dates: Start: 09/26/20       Description: 1) Individualized goal:  complete the SCCAN, with additional goals as deemed appropriate.   2) Interventions:  SLP Cognitive Skill Development                    Problem: Speech/Swallowing LTGs     Dates: Start: 09/26/20       Goal: LTG-By discharge, patient will safely swallow     Dates: Start: 09/26/20       Description: 1) Individualized goal:  regular textures, and thin liquids, without aspiration.   2) Interventions:  SLP Swallowing Dysfunction Treatment, SLP Oral Pharyngeal Evaluation, SLP Video Swallow Evaluation, and SLP Group Treatment              Goal: LTG-By discharge, patient will solve complex problem     Dates: Start: 09/26/20       Description: 1) Individualized goal:  Mar[place]  for safe discharge home.   2) Interventions:  SLP Aphasia Evaluation, SLP Cognitive Skill Development, and SLP Group Treatment                    Problem: Swallowing STGs     Dates: Start: 09/26/20       Goal: STG-Within one week, patient will     Dates: Start: 09/26/20       Description: 1) Individualized goal:  participate in a modified barium swallow study.   2) Interventions:  SLP Video Swallow Evaluation              Goal: STG-Within one week, patient will     Dates: Start: 09/26/20       Description: 1) Individualized goal:  tolerate dys2 trials without oral residue, or s/s of aspiration, over 2 meals.   2) Interventions:  SLP Swallowing Dysfunction Treatment and SLP Group Treatment

## 2020-09-26 NOTE — PROGRESS NOTES
2 RN skin check with Patricia. No open skin areas or pressure sores noted. Moves around in bed independently.

## 2020-09-26 NOTE — CARE PLAN
Problem: Safety  Goal: Will remain free from injury  Outcome: PROGRESSING AS EXPECTED  Note: Patient uses call light consistently and appropriately this shift.  Waits for assistance when needed and does not attempt self transfer.  Able to verbalize needs.      Problem: Pain Management  Goal: Pain level will decrease to patient's comfort goal  Outcome: PROGRESSING AS EXPECTED  Note: Pt able to participate in therapies and activities this shift. Patient able to verbalize pain level and verbalize an acceptable level of pain.

## 2020-09-26 NOTE — FLOWSHEET NOTE
09/25/20 1915   Events/Summary/Plan   Events/Summary/Plan RT Assessment   Vital Signs   Pulse 73   Respiration 16   Pulse Oximetry 95 %   $ Pulse Oximetry (Spot Check) Yes   Respiratory Assessment   Level of Consciousness Alert   Breath Sounds   RUL Breath Sounds Clear   RML Breath Sounds Clear   RLL Breath Sounds Diminished   JESSA Breath Sounds Clear   LLL Breath Sounds Diminished   Secretions   Cough Dry;Non Productive   Oxygen   O2 Delivery Device None - Room Air   Smoking History   Have you ever smoked Yes   Have you smoked in the last 12 months No   Confirm Quit Date 09/25/96

## 2020-09-26 NOTE — FLOWSHEET NOTE
09/25/20 1915   Patient History   Pulmonary Diagnosis None   Procedures Relevant to Respiratory Status None   Home O2 No   Nocturnal CPAP No   Home Treatments/Frequency No   Sleep Apnea Screening   Have you had a sleep study? No   Have you been diagnosed with sleep apnea? No   COPD Risk Screening   Do you have a history of COPD? No   Protocol Pathways   Protocol Pathways None

## 2020-09-26 NOTE — CARE PLAN
Instructed on the importance of calling for assist with all his transfers. Bed and chair alarms are in place. He is close to nurses station for short term memory loss but he hasn't tried to get out of bed. He is alert and oriented x 4.  Problem: Safety  Goal: Will remain free from injury  Outcome: PROGRESSING AS EXPECTED  Goal: Will remain free from falls  Outcome: PROGRESSING AS EXPECTED

## 2020-09-27 LAB
ALBUMIN SERPL BCP-MCNC: 3.8 G/DL (ref 3.2–4.9)
BUN SERPL-MCNC: 39 MG/DL (ref 8–22)
CALCIUM SERPL-MCNC: 9.6 MG/DL (ref 8.5–10.5)
CHLORIDE SERPL-SCNC: 94 MMOL/L (ref 96–112)
CO2 SERPL-SCNC: 25 MMOL/L (ref 20–33)
CREAT SERPL-MCNC: 0.88 MG/DL (ref 0.5–1.4)
GLUCOSE SERPL-MCNC: 97 MG/DL (ref 65–99)
MAGNESIUM SERPL-MCNC: 1.6 MG/DL (ref 1.5–2.5)
PHOSPHATE SERPL-MCNC: 3.8 MG/DL (ref 2.5–4.5)
POTASSIUM SERPL-SCNC: 4.1 MMOL/L (ref 3.6–5.5)
SODIUM SERPL-SCNC: 132 MMOL/L (ref 135–145)

## 2020-09-27 PROCEDURE — 700102 HCHG RX REV CODE 250 W/ 637 OVERRIDE(OP): Performed by: NURSE PRACTITIONER

## 2020-09-27 PROCEDURE — 97130 THER IVNTJ EA ADDL 15 MIN: CPT

## 2020-09-27 PROCEDURE — 83735 ASSAY OF MAGNESIUM: CPT

## 2020-09-27 PROCEDURE — 36415 COLL VENOUS BLD VENIPUNCTURE: CPT

## 2020-09-27 PROCEDURE — A9270 NON-COVERED ITEM OR SERVICE: HCPCS | Performed by: PHYSICAL MEDICINE & REHABILITATION

## 2020-09-27 PROCEDURE — 700102 HCHG RX REV CODE 250 W/ 637 OVERRIDE(OP): Performed by: PHYSICAL MEDICINE & REHABILITATION

## 2020-09-27 PROCEDURE — A9270 NON-COVERED ITEM OR SERVICE: HCPCS | Performed by: NURSE PRACTITIONER

## 2020-09-27 PROCEDURE — 770010 HCHG ROOM/CARE - REHAB SEMI PRIVAT*

## 2020-09-27 PROCEDURE — 80069 RENAL FUNCTION PANEL: CPT

## 2020-09-27 PROCEDURE — 97129 THER IVNTJ 1ST 15 MIN: CPT

## 2020-09-27 RX ADMIN — ACETAMINOPHEN 500 MG: 500 TABLET, FILM COATED ORAL at 17:24

## 2020-09-27 RX ADMIN — APIXABAN 2.5 MG: 5 TABLET, FILM COATED ORAL at 09:39

## 2020-09-27 RX ADMIN — SOTALOL HYDROCHLORIDE 80 MG: 80 TABLET ORAL at 05:28

## 2020-09-27 RX ADMIN — FERROUS SULFATE TAB 325 MG (65 MG ELEMENTAL FE) 325 MG: 325 (65 FE) TAB at 09:38

## 2020-09-27 RX ADMIN — Medication 1 G: at 17:24

## 2020-09-27 RX ADMIN — Medication 1 G: at 12:05

## 2020-09-27 RX ADMIN — Medication 1 G: at 09:38

## 2020-09-27 RX ADMIN — SIMVASTATIN 20 MG: 20 TABLET, FILM COATED ORAL at 21:50

## 2020-09-27 RX ADMIN — HYDRALAZINE HYDROCHLORIDE 25 MG: 25 TABLET, FILM COATED ORAL at 14:42

## 2020-09-27 RX ADMIN — APIXABAN 2.5 MG: 5 TABLET, FILM COATED ORAL at 21:49

## 2020-09-27 RX ADMIN — ACETAMINOPHEN 500 MG: 500 TABLET, FILM COATED ORAL at 21:50

## 2020-09-27 RX ADMIN — DOCUSATE SODIUM 50 MG AND SENNOSIDES 8.6 MG 2 TABLET: 8.6; 5 TABLET, FILM COATED ORAL at 09:38

## 2020-09-27 RX ADMIN — AMLODIPINE BESYLATE 10 MG: 5 TABLET ORAL at 21:50

## 2020-09-27 RX ADMIN — HYDRALAZINE HYDROCHLORIDE 25 MG: 25 TABLET, FILM COATED ORAL at 05:29

## 2020-09-27 RX ADMIN — ACETAMINOPHEN 500 MG: 500 TABLET, FILM COATED ORAL at 12:05

## 2020-09-27 RX ADMIN — HYDRALAZINE HYDROCHLORIDE 25 MG: 25 TABLET, FILM COATED ORAL at 21:51

## 2020-09-27 RX ADMIN — CHOLECALCIFEROL TAB 25 MCG (1000 UNIT) 4000 UNITS: 25 TAB at 09:39

## 2020-09-27 RX ADMIN — SOTALOL HYDROCHLORIDE 80 MG: 80 TABLET ORAL at 17:24

## 2020-09-27 RX ADMIN — ACETAMINOPHEN 500 MG: 500 TABLET, FILM COATED ORAL at 09:38

## 2020-09-27 RX ADMIN — MONTELUKAST 10 MG: 10 TABLET, FILM COATED ORAL at 09:38

## 2020-09-27 RX ADMIN — THIAMINE HCL TAB 100 MG 100 MG: 100 TAB at 09:39

## 2020-09-27 ASSESSMENT — PATIENT HEALTH QUESTIONNAIRE - PHQ9
1. LITTLE INTEREST OR PLEASURE IN DOING THINGS: NOT AT ALL
2. FEELING DOWN, DEPRESSED, IRRITABLE, OR HOPELESS: NOT AT ALL
SUM OF ALL RESPONSES TO PHQ9 QUESTIONS 1 AND 2: 0

## 2020-09-27 ASSESSMENT — FIBROSIS 4 INDEX: FIB4 SCORE: 0.59

## 2020-09-27 ASSESSMENT — PAIN DESCRIPTION - PAIN TYPE: TYPE: ACUTE PAIN

## 2020-09-27 NOTE — CARE PLAN
Problem: Communication  Goal: The ability to communicate needs accurately and effectively will improve  Outcome: PROGRESSING AS EXPECTED  Note: Patient is alert and oriented x 4.      Problem: Safety  Goal: Will remain free from injury  Outcome: PROGRESSING AS EXPECTED  Note: Pt uses call light consistently and appropriately. Waits for assistance does not attempt self transfer this shift. Able to verbalize needs.      Problem: Bowel/Gastric:  Goal: Normal bowel function is maintained or improved  Outcome: PROGRESSING AS EXPECTED  Note: Bowel meds held due to patient having loose stools.  Bowel sounds present in all 4 quadrants.

## 2020-09-27 NOTE — PROGRESS NOTES
[facility] Daily Progress Note    Date of Service  9/27/2020    Chief Complaint  Hyponatremia      HISTORY OF PRESENT ILLNESS:    Per report as patient is not able to give history, patient is a 75 y.o. male who presented 9/12/2020 with altered mental status.  Apparently the patient was transferred for evaluation of altered mental status a few days ago progressively got worse was associated with some right-sided weakness that started in the morning.  Also reported to have a fall a week prior to presentation.  The patient was transferred as a code stroke he was evaluated by neurology and felt that his symptoms are likely related to hypertensive and possibly metabolic encephalopathy.  Etiology of encephalopathy was unclear thought maybe metabolic ?hyponatremia as cause.  EEG with no seizures, no evidence of infection.  Imaging with no evidence of acute infarct.     Nephrology consulted for hyponatremia.       Patient seen at bedside was AOx3 per nurse but now sleeping from [facility].     Daily Nephrology Summary  9/18 - Consult done  9/19 - Patient was started on NS at 100cc/hr last night.  Sodium fluctuating between 120 and 124.  Complains of joint pain and abdominal pain.  9/20 - Not alert to place.  Still with slight abdominal pain.  Denies nausea.  No SOB.  9/21 - alert and oriented x 3 this AM, answers questions appropriately  9/22 - alert and oriented x 3, Na and K improved  9/23 - alert, oriented, Mg low at 1.4  9/24 - poor PO intake, Na decreased, K and Mg wnl, uric acid 3.6, patient slightly confused  9/25 - I/O not well recorded, Na unchanged at 127, K 4.5, Abebe 101 and UOsm 623---off salt tabs/diuretics/saline  9/26 - Now at [facility] as transferred yesterday. No labs done today. Renal panel ordered stat earlier today, but still not done. RN in room now drawing blood. Patient mildly confused, but pleasant. States he feels good.   9/27 - patient not available for interview/exam, but  labs finally done, Na trending up, SBP acceptable, I/O not recorded       Review of Systems  Review of Systems   Unable to perform ROS: Other        Physical Exam  Temp:  [36.4 °C (97.6 °F)-36.9 °C (98.4 °F)] 36.7 °C (98 °F)  Pulse:  [64-77] 71  Resp:  [16-18] 16  BP: (100-140)/(42-70) 120/62  SpO2:  [93 %-97 %] 95 %    Physical Exam  Vitals signs and nursing note reviewed.         Fluids    Intake/Output Summary (Last 24 hours) at 9/27/2020 0942  Last data filed at 9/26/2020 1800  Gross per 24 hour   Intake 340 ml   Output --   Net 340 ml       Laboratory  Recent Labs     09/25/20  0612   WBC 9.2   RBC 3.40*   HEMOGLOBIN 12.1*   HEMATOCRIT 34.7*   .1*   MCH 35.6*   MCHC 34.9   RDW 48.7   PLATELETCT 582*   MPV 10.0     Recent Labs     09/25/20  0612 09/26/20  1315 09/27/20  0509   SODIUM 127* 129* 132*   POTASSIUM 4.5 4.5 4.1   CHLORIDE 90* 92* 94*   CO2 24 23 25   GLUCOSE 104* 161* 97   BUN 31* 41* 39*   CREATININE 0.86 1.10 0.88   CALCIUM 9.6 9.4 9.6                 Imaging   reviewed       IMPRESSION:  - Hyponatremia---SIADH    * Etiology initially appeared mixed, initial urine lytes/osm collected after patient had been provided IVF, unclear reliability.     * TSH wnl, protein wnl; on leupron, but no documented h/o recent prostate procedure; prealbumin low, K low, uric acid 3.6, BUN wnl/near normal; CO2 wnl, hypokalemia present since admission now corrected      * Na initially improved with IVF, then decreased again, had been labile; he has also received lasix; started on salt tabs, subsequently DC, but now back on salt tabs TID    Uric acid <4     * Patient has been euvolemic on exam with mildly reduced serum osm, no hypotension, Abebe 101 and UOsm 623 (off NS, diuretics, salt)---findings now c/w SIADH      - Proteinuria    * Given slightly elevated urine protein and anemia checked SPEP/ UPEP    * FLC ratio elevated     * Small blood in urine likely from enrique, don't suspect GN    * Can repeat UA when enrique  is removed     - Acute Encephalopathy    * Unclear etiology, initially thought to be associated with hyponatremia and HTN, doubt associated with current mild/moderate hyponatremia that has been relatively stable    * Continue management per primary team and neurology     - Anemia    * Anemia of chronic disease    * Ferritin 550, %sat 9     - Hypokalemia    * present since admission, may have worsened with diuretics use    * Hold diuretics     * Keep K >4, not on supplement currently, will re-eval need when lab results come back 9/26     - Hypomagnesemia    * Replete PRN, ordered PO Mg supplement 9/25, but not on it currently, repeat Mg 1.6 9/27    * Keep Mg wnl    - Vitamin D Deficiency, level 26    * Vit D-3, 4000iu po daily     - Paroxysmal Atrial Fibrillation    * management per primary team, on sotalol and metoprolol     - HTN    * goal <140/90     - Dyslipidemia     - Back Pain     - hyperglycemia (mild), A1c 5.6         PLAN:     - Fluid restriction to 1L daily, continue 1g salt tab TID with meals  -  Keep K>4, Mg wnl  - Strict I/O  - Vit D-3, 4000iu po daily  - High protein diet, high potassium diet  - Avoid nephrotoxic agents  - SPEP with restriction of protein migration in the gamma region.  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 FLC ratio 1.74 (elevated). RECOMMEND PRIMARY SERVICE OBTAIN  HEMATOLOGY CONSULT FOR EVALUATION  - Continue amlodipine 10mg qhs  - check renal panel qam  - continue hydralazine 25mq [id number], increase as needed     PMH/PSH/FH/SH/meds/labs reviewed        Other management per primary service

## 2020-09-27 NOTE — THERAPY
Speech Language Pathology  Daily Treatment     Patient Name: Jordan Alexander  Age:  75 y.o., Sex:  male  Medical Record #: [id number]  Today's Date: 9/27/2020     Precautions  Precautions: Fall Risk  Comments: impaired cognition    Subjective    Pt pleasant and cooperative during tx.       Objective       09/27/20 0931   Cognition   Functional Memory Activities Moderate (3)   SCCAN (Scales of Cognitive and Communicative Ability for Neurorehabilitation)   Reading Comprehension - Raw Score 10   Reading Comprehension - Scale Performance Score 83   Writing - Raw Score 5   Writing - Scale Performance Score 71   Attention - Raw Score 12   Attention - Scale Performance Score 75   Problem Solving - Raw Score 20   Problem Solving - Scale Performance Score 87   SCCAN Total Raw Score 76   SCCAN Degree of Severity Mild Impairment   SLP Total Time Spent   SLP Individual Total Time Spent (Mins) 60   Treatment Charges   SLP Cognitive Skill Development First 15 Minutes 1   SLP Cognitive Skill Development Additional 15 Minutes 3       Assessment    SCCAN completed this day.  Pt scored 76, which is characteristic of mild cognitive deficits.  Pt presented with greatest deficits in attn, memory, written expression (spelling inaccuracies).  Memory book introduced this day.  Pt benefits from min-mod verbal cues to recall daily events.     Strengths: Able to follow instructions, Independent prior level of function, Pleasant and cooperative, Motivated for self care and independence, Willingly participates in therapeutic activities  Barriers: Aspiration risk, Impaired functional cognition    Plan    Target recall, attn, meds/finance    Speech Therapy Problems     Problem: Memory STGs     Dates: Start: 09/26/20       Goal: STG-Within one week, patient will     Dates: Start: 09/26/20       Description: 1) Individualized goal:  Recall daily events, and safety strategies with use of external aid, 80% Jo-Ann.   2) Interventions:  SLP Cognitive Skill  Development                    Problem: Problem Solving STGs     Dates: Start: 09/26/20       Goal: STG-Within one week, patient will     Dates: Start: 09/26/20       Description: 1) Individualized goal:  complete the SCCAN, with additional goals as deemed appropriate.   2) Interventions:  SLP Cognitive Skill Development                    Problem: Speech/Swallowing LTGs     Dates: Start: 09/26/20       Goal: LTG-By discharge, patient will safely swallow     Dates: Start: 09/26/20       Description: 1) Individualized goal:  regular textures, and thin liquids, without aspiration.   2) Interventions:  SLP Swallowing Dysfunction Treatment, SLP Oral Pharyngeal Evaluation, SLP Video Swallow Evaluation, and SLP Group Treatment              Goal: LTG-By discharge, patient will solve complex problem     Dates: Start: 09/26/20       Description: 1) Individualized goal:  Marco for safe discharge home.   2) Interventions:  SLP Aphasia Evaluation, SLP Cognitive Skill Development, and SLP Group Treatment                    Problem: Swallowing STGs     Dates: Start: 09/26/20       Goal: STG-Within one week, patient will     Dates: Start: 09/26/20       Description: 1) Individualized goal:  participate in a modified barium swallow study.   2) Interventions:  SLP Video Swallow Evaluation              Goal: STG-Within one week, patient will     Dates: Start: 09/26/20       Description: 1) Individualized goal:  tolerate dys2 trials without oral residue, or s/s of aspiration, over 2 meals.   2) Interventions:  SLP Swallowing Dysfunction Treatment and SLP Group Treatment

## 2020-09-27 NOTE — DISCHARGE PLANNING
CASE MANAGEMENT INITIAL ASSESSMENT    Admit Date:  9/25/2020     I spoke with patient and wife Erasmo to discuss role of case management / discharge planning / team conference. Wife román patient was alert and able to provider information.   Patient is a  75 y.o. male transferred from [facility] where is was admitted from 09/12-09/25. Admitting Provider at [facility] is Dr. Andrews.    PCP: Dr. Bhatti ([place]) Dr. Garcia ([place])  Cardiology: Dr. Beltre ([place])    Diagnosis: 0002.1-Brain Dysfunction: Non-Traumatic  AMS (altered mental status)  Hypertensive urgency    Co-morbidities:   Patient Active Problem List    Diagnosis Date Noted   • Dysphasia 09/20/2020     Priority: High   • Acute encephalopathy 09/12/2020     Priority: High   • Hyponatremia 09/12/2020     Priority: High   • Hypokalemia 09/12/2020     Priority: High   • Back pain 09/12/2020     Priority: Low   • Dyslipidemia 09/12/2020     Priority: Low   • Macrocytic anemia 09/19/2020   • Hypomagnesemia 09/16/2020   • Paroxysmal atrial fibrillation (HCC) 09/12/2020     Prior Living Situation:  Housing / Facility: [address]  Lives with - Patient's Self Care Capacity: Spouse    Prior Level of Function:  Medication Management: Independent  Finances: Independent  Home Management: Independent  Shopping: Independent  Prior Level Of Mobility: Independent Without Device in Community  Driving / Transportation: Driving Independent    Support Systems:  Primary : Cynthia Alexander (wife) [phone]    Previous Services Utilized:   Equipment Owned: Grab Bar(s) In Tub / Shower  Prior Services: Home-Independent    Other Information:  Occupation (Pre-Hospital Vocational): Retired Due To Age  Primary Payor Source: Medicare A, Medicare B  Secondary Payor Source: Other (Comments)(anthem )  Primary Care Practitioner : Gillian Bhatti (pcp in [place]) (has a pcp in [place] but does not know name. )    Patient / Family Goal:  Patient / Family  Goal: patient has 2 homes. One 2 story home in [facility] where patient stays may-oct. and another home in [place] where he lives end of October to May. Patient will be return to [place] once discharged. patient lives with spouse Erasmo and is independent. He does not have any DME at this time. Patients PCP in [place] is Michael Garcia. Patients goal is to get better so he can get back to [facility].     Additional Case Management Questions:  Have you ever received case management services for yourself or a family member? No    Do you feel you have and an understanding of what services  provide? Yes    Do you have any additional questions regarding case management? No      CASE MANAGEMENT PLAN OF CARE   Individualized Goals:   1. Get home to [place]  2. Make Dr. Beltre aware of medication changes  3. Set up follow up     Barriers:   1. Cognitive deficits  2. Functional deficits     Plan:  1. Continue to follow patient through hospitalization and provide discharge planning in collaboration with patient, family, physicians and ancillary services.     2. Utilize community resources to ensure a safe discharge.

## 2020-09-28 ENCOUNTER — APPOINTMENT (OUTPATIENT)
Dept: PAIN MANAGEMENT | Facility: REHABILITATION | Age: 75
DRG: 071 | End: 2020-09-28
Attending: PHYSICAL MEDICINE & REHABILITATION
Payer: MEDICARE

## 2020-09-28 ENCOUNTER — APPOINTMENT (OUTPATIENT)
Dept: RADIOLOGY | Facility: REHABILITATION | Age: 75
DRG: 071 | End: 2020-09-28
Attending: PHYSICAL MEDICINE & REHABILITATION
Payer: MEDICARE

## 2020-09-28 PROCEDURE — 97535 SELF CARE MNGMENT TRAINING: CPT | Mod: CO

## 2020-09-28 PROCEDURE — 97530 THERAPEUTIC ACTIVITIES: CPT

## 2020-09-28 PROCEDURE — 97110 THERAPEUTIC EXERCISES: CPT

## 2020-09-28 PROCEDURE — 770010 HCHG ROOM/CARE - REHAB SEMI PRIVAT*

## 2020-09-28 PROCEDURE — 700102 HCHG RX REV CODE 250 W/ 637 OVERRIDE(OP): Performed by: NURSE PRACTITIONER

## 2020-09-28 PROCEDURE — 92611 MOTION FLUOROSCOPY/SWALLOW: CPT

## 2020-09-28 PROCEDURE — A9270 NON-COVERED ITEM OR SERVICE: HCPCS | Performed by: NURSE PRACTITIONER

## 2020-09-28 PROCEDURE — 92508 TX SP LANG VOICE COMM GROUP: CPT

## 2020-09-28 PROCEDURE — 97129 THER IVNTJ 1ST 15 MIN: CPT

## 2020-09-28 PROCEDURE — 99232 SBSQ HOSP IP/OBS MODERATE 35: CPT | Performed by: PHYSICAL MEDICINE & REHABILITATION

## 2020-09-28 PROCEDURE — 97130 THER IVNTJ EA ADDL 15 MIN: CPT

## 2020-09-28 PROCEDURE — A9270 NON-COVERED ITEM OR SERVICE: HCPCS | Performed by: PHYSICAL MEDICINE & REHABILITATION

## 2020-09-28 PROCEDURE — 700102 HCHG RX REV CODE 250 W/ 637 OVERRIDE(OP): Performed by: PHYSICAL MEDICINE & REHABILITATION

## 2020-09-28 PROCEDURE — 74230 X-RAY XM SWLNG FUNCJ C+: CPT

## 2020-09-28 RX ADMIN — SOTALOL HYDROCHLORIDE 80 MG: 80 TABLET ORAL at 05:38

## 2020-09-28 RX ADMIN — Medication 1 G: at 17:50

## 2020-09-28 RX ADMIN — ACETAMINOPHEN 500 MG: 500 TABLET, FILM COATED ORAL at 08:06

## 2020-09-28 RX ADMIN — SOTALOL HYDROCHLORIDE 80 MG: 80 TABLET ORAL at 17:50

## 2020-09-28 RX ADMIN — CHOLECALCIFEROL TAB 25 MCG (1000 UNIT) 4000 UNITS: 25 TAB at 08:06

## 2020-09-28 RX ADMIN — APIXABAN 2.5 MG: 5 TABLET, FILM COATED ORAL at 08:07

## 2020-09-28 RX ADMIN — AMLODIPINE BESYLATE 10 MG: 5 TABLET ORAL at 21:41

## 2020-09-28 RX ADMIN — DOCUSATE SODIUM 50 MG AND SENNOSIDES 8.6 MG 2 TABLET: 8.6; 5 TABLET, FILM COATED ORAL at 21:40

## 2020-09-28 RX ADMIN — APIXABAN 2.5 MG: 5 TABLET, FILM COATED ORAL at 21:41

## 2020-09-28 RX ADMIN — HYDRALAZINE HYDROCHLORIDE 25 MG: 25 TABLET, FILM COATED ORAL at 05:38

## 2020-09-28 RX ADMIN — HYDRALAZINE HYDROCHLORIDE 25 MG: 25 TABLET, FILM COATED ORAL at 21:41

## 2020-09-28 RX ADMIN — ACETAMINOPHEN 500 MG: 500 TABLET, FILM COATED ORAL at 14:23

## 2020-09-28 RX ADMIN — SIMVASTATIN 20 MG: 20 TABLET, FILM COATED ORAL at 21:41

## 2020-09-28 RX ADMIN — ACETAMINOPHEN 500 MG: 500 TABLET, FILM COATED ORAL at 21:41

## 2020-09-28 RX ADMIN — HYDRALAZINE HYDROCHLORIDE 25 MG: 25 TABLET, FILM COATED ORAL at 14:23

## 2020-09-28 RX ADMIN — Medication 1 G: at 11:19

## 2020-09-28 RX ADMIN — THIAMINE HCL TAB 100 MG 100 MG: 100 TAB at 08:07

## 2020-09-28 RX ADMIN — Medication 1 G: at 08:06

## 2020-09-28 RX ADMIN — ACETAMINOPHEN 500 MG: 500 TABLET, FILM COATED ORAL at 17:50

## 2020-09-28 RX ADMIN — MONTELUKAST 10 MG: 10 TABLET, FILM COATED ORAL at 08:06

## 2020-09-28 ASSESSMENT — ENCOUNTER SYMPTOMS
CONSTITUTIONAL NEGATIVE: 1
MUSCULOSKELETAL NEGATIVE: 1
RESPIRATORY NEGATIVE: 1
CARDIOVASCULAR NEGATIVE: 1
PSYCHIATRIC NEGATIVE: 1
NEUROLOGICAL NEGATIVE: 1
EYES NEGATIVE: 1

## 2020-09-28 ASSESSMENT — ACTIVITIES OF DAILY LIVING (ADL)
TOILETING_LEVEL_OF_ASSIST_DESCRIPTION: GRAB BAR
BED_CHAIR_WHEELCHAIR_TRANSFER_DESCRIPTION: SET-UP OF EQUIPMENT;SUPERVISION FOR SAFETY
TOILET_TRANSFER_DESCRIPTION: GRAB BAR;SUPERVISION FOR SAFETY

## 2020-09-28 ASSESSMENT — PATIENT HEALTH QUESTIONNAIRE - PHQ9
SUM OF ALL RESPONSES TO PHQ9 QUESTIONS 1 AND 2: 0
1. LITTLE INTEREST OR PLEASURE IN DOING THINGS: NOT AT ALL
2. FEELING DOWN, DEPRESSED, IRRITABLE, OR HOPELESS: NOT AT ALL

## 2020-09-28 NOTE — DIETARY
"Nutrition Care/ Consult For MST 3     Assessment:    Admitting Diagnosis: Brain Dysfunction: Non-Traumatic  Pertinent PMH: PAF, DL, Anemia     Additional Information: Patient in bed sleeping at time of visit. Wife not at bedside.  MST noted however, MST completed by wife via [facility] was negative.  Per review of chart and discussion with nutrition rep, patient does endorse poor appetite.  He reports \"eating as much as he can.\"  Diet upgraded today to SB6/ TN0.  Weight upon admission to [facility] was 68 kg taken via bed scale.  Current weight 70.5kg - up slightly.  Patient was on TF for nutrition support while at [facility].    Patient currently on 1L fluid restriction s/t SIADH.      No GI/ complaints. Poor dentition observed during visit. Has upper and lower dentures.   Can self feed.   Declined snacks.       Appropriate for Education? No  Education in Past? N/a   Appetite: Fair   Diet: SB6/ TN0- High protein/ high potassium   Average PO intake x 3 days: ~43% of meals     Labs: Na+ 132, Cl 94, BUN 39  Medications: Tylenol, Amlodipine, Eliquis, Ferrous Sulfate, Hydralazine, Singulair, Senna-docusate, Simvastatin, 1gm NaCl TID, Betapace, Vitamin D   PRN Medications: noted   IVF: n/a     Height: 170.2cm   Weight:  70.5 kg   BMI: 24.34- WNL     Skin: CDI  GI: BM today   : yellow UOP   Vitals: 136/68, RA  I/Os: +320mL x 24 hours     Nutrient Needs:  Kcal: 1700- 1800 kcals/day BEE= 1390   Protein: 70- 84 g/day ( 1-1.2 g/kg)   Fluid: 1000-1200mL/day       Malnutrition risk:  No risk identified      Diagnosis:  Inadequate oral intake r/t fair to poor appetite in setting of recent hospitalization/ illness/ acute encephalopathy as evidenced by intake <50% of nutrient needs.     Intervention/ Recommendations/POC:  1. Continue current diet. Will add Boost Plus BID with lunch and dinner.  Will not count in fluid restriction as PO is inadequate.   2.Encourage adequate PO/fluid intake.  3. Nutrition rep to see regarding food prefs/ honor " within dietary restrictions (if indicated)  4. Fortify foods that can be fortified to increase overall kcal intake without expanding volume of intake.      Monitor/Evaluation: Monitor PO intake, weight, labs, medication adjustments, skin integrity, GI function, vitals, I/Os, and overall hydration status.  Adjust nutritional POC pending clinical outcomes.    RD following weekly.   Goal: Maintain >/= 50% oral nutrient/fluid intake to promote nutrition optimization/healing.

## 2020-09-28 NOTE — PROGRESS NOTES
[facility] Daily Progress Note    Date of Service  9/28/2020    Chief Complaint  Hyponatremia      HISTORY OF PRESENT ILLNESS:    Per report as patient is not able to give history, patient is a 75 y.o. male who presented 9/12/2020 with altered mental status.  Apparently the patient was transferred for evaluation of altered mental status a few days ago progressively got worse was associated with some right-sided weakness that started in the morning.  Also reported to have a fall a week prior to presentation.  The patient was transferred as a code stroke he was evaluated by neurology and felt that his symptoms are likely related to hypertensive and possibly metabolic encephalopathy.  Etiology of encephalopathy was unclear thought maybe metabolic ?hyponatremia as cause.  EEG with no seizures, no evidence of infection.  Imaging with no evidence of acute infarct.     Nephrology consulted for hyponatremia.       Patient seen at bedside was AOx3 per nurse but now sleeping from [facility].     Daily Nephrology Summary  9/18 - Consult done  9/19 - Patient was started on NS at 100cc/hr last night.  Sodium fluctuating between 120 and 124.  Complains of joint pain and abdominal pain.  9/20 - Not alert to place.  Still with slight abdominal pain.  Denies nausea.  No SOB.  9/21 - alert and oriented x 3 this AM, answers questions appropriately  9/22 - alert and oriented x 3, Na and K improved  9/23 - alert, oriented, Mg low at 1.4  9/24 - poor PO intake, Na decreased, K and Mg wnl, uric acid 3.6, patient slightly confused  9/25 - I/O not well recorded, Na unchanged at 127, K 4.5, Abebe 101 and UOsm 623---off salt tabs/diuretics/saline  9/26 - Now at [facility] as transferred yesterday. No labs done today. Renal panel ordered stat earlier today, but still not done. RN in room now drawing blood. Patient mildly confused, but pleasant. States he feels good.   9/27 - patient not available for interview/exam, but  labs finally done, Na trending up, SBP acceptable, I/O not recorded  9/28 - Pt sitting up in bed visiting with wife, feels well overall, ready for lunch, SNa improved to 132, BP and remainder of labs stable, UOP not documented       Review of Systems  Review of Systems   Constitutional: Negative.    HENT: Negative.    Eyes: Negative.    Respiratory: Negative.    Cardiovascular: Negative.    Gastrointestinal:        Diminished appetite, but improving   Genitourinary: Negative.    Musculoskeletal: Negative.    Skin: Negative.    Neurological: Negative.    Endo/Heme/Allergies: Negative.    Psychiatric/Behavioral: Negative.         Physical Exam  Temp:  [36.1 °C (96.9 °F)-36.6 °C (97.8 °F)] 36.1 °C (96.9 °F)  Pulse:  [64-74] 64  Resp:  [16-18] 18  BP: (103-128)/(50-60) 103/50  SpO2:  [92 %-96 %] 95 %    Physical Exam  Vitals signs and nursing note reviewed.   Constitutional:       Appearance: Normal appearance.   HENT:      Head: Normocephalic and atraumatic.      Nose: Nose normal.      Mouth/Throat:      Mouth: Mucous membranes are moist.   Eyes:      Extraocular Movements: Extraocular movements intact.      Pupils: Pupils are equal, round, and reactive to light.   Neck:      Musculoskeletal: Normal range of motion and neck supple.   Cardiovascular:      Rate and Rhythm: Normal rate and regular rhythm.      Pulses: Normal pulses.      Heart sounds: Normal heart sounds.   Pulmonary:      Effort: Pulmonary effort is normal.      Breath sounds: Normal breath sounds.   Abdominal:      General: Bowel sounds are normal.      Palpations: Abdomen is soft.   Musculoskeletal: Normal range of motion.   Skin:     General: Skin is warm and dry.   Neurological:      General: No focal deficit present.      Mental Status: He is oriented to person, place, and time.   Psychiatric:         Mood and Affect: Mood normal.         Behavior: Behavior normal.         Fluids    Intake/Output Summary (Last 24 hours) at 9/28/2020 1133  Last data  filed at 9/28/2020 0800  Gross per 24 hour   Intake 450 ml   Output --   Net 450 ml       Laboratory      Recent Labs     09/26/20  1315 09/27/20  0509   SODIUM 129* 132*   POTASSIUM 4.5 4.1   CHLORIDE 92* 94*   CO2 23 25   GLUCOSE 161* 97   BUN 41* 39*   CREATININE 1.10 0.88   CALCIUM 9.4 9.6                 Imaging  Available labs, imaging and clinical documentation reviewed.      IMPRESSION:  - Hyponatremia---SIADH; improved     * Etiology initially appeared mixed, initial urine lytes/osm collected after patient had been provided IVF, unclear reliability.     * TSH wnl, protein wnl; on leupron, but no documented h/o recent prostate procedure; prealbumin low, K low, uric acid 3.6, BUN wnl/near normal; CO2 wnl, hypokalemia present since admission now corrected      * Na initially improved with IVF, then decreased again, had been labile; he has also received lasix; started on salt tabs, subsequently DC, but now back on salt tabs TID    Uric acid <4     * Patient has been euvolemic on exam with mildly reduced serum osm, no hypotension, Abebe 101 and UOsm 623 (off NS, diuretics, salt)---findings now c/w SIADH      - Proteinuria    * Given slightly elevated urine protein and anemia checked SPEP/ UPEP    * FLC ratio elevated     * Small blood in urine likely from enrique, don't suspect GN    * Can repeat UA when enrique is removed     - Acute Encephalopathy, resolved     * Unclear etiology, initially thought to be associated with hyponatremia and HTN, doubt associated with current mild/moderate hyponatremia that has been relatively stable    * Continue management per primary team and neurology     - Anemia    * Anemia of chronic disease    * Ferritin 550, %sat 9    * PO Fe     - Hypokalemia, corrected    * Present since admission, may have worsened with diuretic use    * Hold diuretics     * Keep K >4, not on supplement currently     - Hypomagnesemia, improved     * Replete PRN, ordered PO Mg supplement 9/25, but not on it  currently, repeat Mg 1.6 9/27    * Keep Mg wnl    - Vitamin D Deficiency, level 26    * Vit D-3, 4000iu po daily     - Paroxysmal Atrial Fibrillation    * Management per primary team, on sotalol and metoprolol     - HTN, controlled     * Goal <140/90     - Dyslipidemia     - Back Pain     - Hyperglycemia (mild), A1c 5.6         PLAN:  - Continue fluid restriction to 1L daily  - Continue 1g salt tab TID with meals  - Keep K>4, Mg wnl  - Strict I/O  - Continue Vit D-3, 4000iu po daily  - High protein diet, high potassium diet  - Avoid nephrotoxic agents  - SPEP with restriction of protein migration in the gamma region.  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 FLC ratio 1.74 (elevated). RECOMMEND PRIMARY SERVICE OBTAIN  HEMATOLOGY CONSULT FOR EVALUATION  - Continue current antihtn regimen with holding parameters  - Follow labs  - Nephrology will sign off, please contact with any questions/concerns      Thank you,

## 2020-09-28 NOTE — REHAB-DIETARY IDT TEAM NOTE
"Dietary   Nutrition  Dietary Problems     Problem: Inadequate nutrient intake     Goal: Patient to consume greater than or equal to 50% of meals                     Nutrition Care/ Consult For MST 3      Assessment:     Admitting Diagnosis: Brain Dysfunction: Non-Traumatic  Pertinent PMH: PAF, DL, Anemia      Additional Information: Patient in bed sleeping at time of visit. Wife not at bedside.  MST noted however, MST completed by wife via [facility] was negative.  Per review of chart and discussion with nutrition rep, patient does endorse poor appetite.  He reports \"eating as much as he can.\"  Diet upgraded today to SB6/ TN0.  Weight upon admission to [facility] was 68 kg taken via bed scale.  Current weight 70.5kg - up slightly.  Patient was on TF for nutrition support while at [facility].     Patient currently on 1L fluid restriction s/t SIADH.       No GI/ complaints. Poor dentition observed during visit. Has upper and lower dentures.   Can self feed.   Declined snacks.         Appropriate for Education? No  Education in Past? N/a   Appetite: Fair   Diet: SB6/ TN0- High protein/ high potassium   Average PO intake x 3 days: ~43% of meals      Labs: Na+ 132, Cl 94, BUN 39  Medications: Tylenol, Amlodipine, Eliquis, Ferrous Sulfate, Hydralazine, Singulair, Senna-docusate, Simvastatin, 1gm NaCl TID, Betapace, Vitamin D   PRN Medications: noted   IVF: n/a      Height: 170.2cm   Weight:  70.5 kg   BMI: 24.34- WNL      Skin: CDI  GI: BM today   : yellow UOP   Vitals: 136/68, RA  I/Os: +320mL x 24 hours      Nutrient Needs:  Kcal: 1700- 1800 kcals/day    BEE= 1390   Protein: 70- 84 g/day ( 1-1.2 g/kg)   Fluid: 1000-1200mL/day         Malnutrition risk:  No risk identified      Diagnosis:  Inadequate oral intake r/t fair to poor appetite in setting of recent hospitalization/ illness/ acute encephalopathy as evidenced by intake <50% of nutrient needs.      Intervention/ Recommendations/POC:  1. Continue current diet. Will add Boost " Plus BID with lunch and dinner.  Will not count in fluid restriction as PO is inadequate.   2.Encourage adequate PO/fluid intake.  3. Nutrition rep to see regarding food prefs/ honor within dietary restrictions (if indicated)  4. Fortify foods that can be fortified to increase overall kcal intake without expanding volume of intake.      Monitor/Evaluation: Monitor PO intake, weight, labs, medication adjustments, skin integrity, GI function, vitals, I/Os, and overall hydration status.  Adjust nutritional POC pending clinical outcomes.    RD following weekly.   Goal: Maintain >/= 50% oral nutrient/fluid intake to promote nutrition optimization/healing.                 Section completed by:  Jaylin Eaton R.D.

## 2020-09-28 NOTE — CARE PLAN
Problem: Problem Solving STGs  Goal: STG-Within one week, patient will  Description: 1) Individualized goal:  Complete executive function tasks related to medication management/finance, with 80% accuracy, given min verbal cues.    2) Interventions:  SLP Cognitive Skill Development and SLP Group Treatment      Outcome: NOT MET  Note: Continue to target      Problem: Memory STGs  Goal: STG-Within one week, patient will  Description: 1) Individualized goal:  Recall daily events, and safety strategies with use of external aid, 80% Jo-Ann.   2) Interventions:  SLP Cognitive Skill Development      Outcome: NOT MET  Note: Continue to target      Problem: Swallowing STGs  Goal: STG-Within one week, patient will  Description: 1) Individualized goal:  participate in a modified barium swallow study.   2) Interventions:  SLP Video Swallow Evaluation      Outcome: MET  Note: MBSS completed   Goal: STG-Within one week, patient will  Description: 1) Individualized goal:  tolerate dys2 trials without oral residue, or s/s of aspiration, over 2 meals.   2) Interventions:  SLP Swallowing Dysfunction Treatment and SLP Group Treatment      Outcome: MET  Note: Pt is currently tolerating a 6- Soft & Bite Sized diet and thin liquids      Problem: Problem Solving STGs  Goal: STG-Within one week, patient will  Description: 1) Individualized goal:  complete the SCCAN, with additional goals as deemed appropriate.   2) Interventions:  SLP Cognitive Skill Development      Outcome: MET  Note: SCCAN completed

## 2020-09-28 NOTE — CARE PLAN
Problem: Safety  Goal: Will remain free from falls  Note: Pt uses call light consistently and appropriately. Waits for assistance does not attempt self transfer this shift. Able to verbalize needs.      Problem: Pain Management  Goal: Pain level will decrease to patient's comfort goal  Intervention: Follow pain managment plan developed in collaboration with patient and Interdisciplinary Team  Note: Pt able to participate in therapies and activities this shift.

## 2020-09-28 NOTE — CARE PLAN
Problem: Communication  Goal: The ability to communicate needs accurately and effectively will improve  Outcome: PROGRESSING AS EXPECTED  Note: Patient has been alert and oriented x 4 this shift.      Problem: Bowel/Gastric:  Goal: Normal bowel function is maintained or improved  Outcome: PROGRESSING AS EXPECTED  Note: Bowel sounds present in all quadrants.  No loose stools noted this shift.  Patient refused pm bowel meds.

## 2020-09-28 NOTE — THERAPY
Speech Language Pathology  Daily Treatment     Patient Name: Jordan Alexander  Age:  75 y.o., Sex:  male  Medical Record #: [id number]  Today's Date: 9/28/2020     Precautions  Precautions: Fall Risk  Comments: memory deficits     Subjective    Patient is pleasant and cooperative.     Objective       09/28/20 0801   Group Therapy    Group Topic Dysphagia   Reason for Group Therapy To Increase Active Participation through Peer Pressure;To Enhance Motivation;To Validate Increased [place] with Skills;To Facilitate Goal Discussion ;To Reinforce Strengthening and Movement Patterns through Group Format Demonstration   Group Treatment Provided Patient seen at breakfast with current diet ( 4-pureed and 2-mildly thick)  patient tolerated these textures with no overt s/sx of aspiration.  Educated patient on compensatory swallow strategies for small bites and sips and double swallows with written cue card provided.  Patient also assessed with canned peaches.  He needed extra time for mastication and cues to chew thoroughly but tolerated with no overt s/sx of aspiration.   SLP Total Time Spent   SLP Individual Total Time Spent (Mins) 30   Treatment Charges   SLP Treatment - Group Speech Language Treatment - Group       Assessment    Patient seen at breakfast with current diet ( 4-pureed and 2-mildly thick)  patient tolerated these textures with no overt s/sx of aspiration.  Educated patient on compensatory swallow strategies for small bites and sips and double swallows with written cue card provided.  Patient also assessed with canned peaches.  He needed extra time for mastication and cues to chew thoroughly but tolerated with no overt s/sx of aspiration.    Strengths: Able to follow instructions, Independent prior level of function, Pleasant and cooperative, Motivated for self care and independence, Willingly participates in therapeutic activities  Barriers: Aspiration risk, Impaired functional cognition    Plan    Therapeutic  trials of (6) soft and bite sized, and (0) thin liquids.  Training in use of compensatory swallow strategies.    Speech Therapy Problems     Problem: Memory STGs     Dates: Start: 09/26/20       Goal: STG-Within one week, patient will     Dates: Start: 09/26/20       Description: 1) Individualized goal:  Recall daily events, and safety strategies with use of external aid, 80% Jo-Ann.   2) Interventions:  SLP Cognitive Skill Development                    Problem: Problem Solving STGs     Dates: Start: 09/26/20       Goal: STG-Within one week, patient will     Dates: Start: 09/26/20       Description: 1) Individualized goal:  complete the SCCAN, with additional goals as deemed appropriate.   2) Interventions:  SLP Cognitive Skill Development              Goal: STG-Within one week, patient will     Dates: Start: 09/27/20       Description: 1) Individualized goal:  Complete executive function tasks related to medication management/finance, with 80% accuracy, given min verbal cues.    2) Interventions:  SLP Cognitive Skill Development and SLP Group Treatment                    Problem: Speech/Swallowing LTGs     Dates: Start: 09/26/20       Goal: LTG-By discharge, patient will safely swallow     Dates: Start: 09/26/20       Description: 1) Individualized goal:  regular textures, and thin liquids, without aspiration.   2) Interventions:  SLP Swallowing Dysfunction Treatment, SLP Oral Pharyngeal Evaluation, SLP Video Swallow Evaluation, and SLP Group Treatment              Goal: LTG-By discharge, patient will solve complex problem     Dates: Start: 09/26/20       Description: 1) Individualized goal:  Marco for safe discharge home.   2) Interventions:  SLP Aphasia Evaluation, SLP Cognitive Skill Development, and SLP Group Treatment                    Problem: Swallowing STGs     Dates: Start: 09/26/20       Goal: STG-Within one week, patient will     Dates: Start: 09/26/20       Description: 1) Individualized goal:   participate in a modified barium swallow study.   2) Interventions:  SLP Video Swallow Evaluation              Goal: STG-Within one week, patient will     Dates: Start: 09/26/20       Description: 1) Individualized goal:  tolerate dys2 trials without oral residue, or s/s of aspiration, over 2 meals.   2) Interventions:  SLP Swallowing Dysfunction Treatment and SLP Group Treatment

## 2020-09-28 NOTE — PROGRESS NOTES
Patient care assumed. Report received from [facility] LORETA Vasquez. Patient is alert and calm, resting in bed. Call light and bedside table within reach. Will continue to monitor.

## 2020-09-28 NOTE — THERAPY
"Occupational Therapy  Daily Treatment     Patient Name: Jordan Alexander  Age:  75 y.o., Sex:  male  Medical Record #: [id number]  Today's Date: 9/28/2020     Precautions  Precautions: (P) Fall Risk  Comments: (P) decreased memory          Subjective    \" That would be good.\" referring to shower as tx activity      Objective       09/28/20 0701   Precautions   Precautions Fall Risk   Comments decreased memory    Functional Level of Assist   Grooming Stand by Assist  (standing at sink)   Bathing Supervision  (setup/ supervision )   Bathing Description Grab bar;Hand held shower;Tub bench   Upper Body Dressing Supervision  (therapist retrieved shirt from closet )   Upper Body Dressing Description Set-up of equipment   Lower Body Dressing Supervision  ( doff pants brief socks,  don brief pants socks and shoes )   Toileting Supervision   Toileting Description Grab bar   Bed, Chair, Wheelchair Transfer Stand by Assist   Toilet Transfers Supervised  (cues to lock w/c brakes )   Toilet Transfer Description Grab bar;Supervision for safety   Tub / Shower Transfers Supervised  (no device  in / out of shower  )   Problem Solving Supervision  (1 cue )   Memory Supervision  (occasional cues for memory as related to tasks peformed)   Interdisciplinary Plan of Care Collaboration   Patient Position at End of Therapy Seated;Self Releasing Lap Belt Applied  (in T dine for breakfast )   OT Total Time Spent   OT Individual Total Time Spent (Mins) 60   OT Charge Group   OT Self Care / ADL 4      Close SBA for mobility   No device    Toilet  <-> shower     Assessment    1 cue  Problem solving  To take guard off of electric razor after complaining it was not providing a close shave   cues to lock brakes on w/c    One cue to recall that  Electric razor was here at hospital and plugged in at  Bedside    Making good functional gains. Progress to  ADL tasks with no AD for mobility     Strengths: Adequate strength, Independent prior level of function, " Pleasant and cooperative, Willingly participates in therapeutic activities  Barriers: Confused, Decreased endurance, Impaired activity tolerance, Impaired balance, Impaired carryover of learning, Impaired insight/denial of deficits, Impaired functional cognition    Plan     ADL  IADL , related mobility and cognition  strength/endurance building   Balance activity     Occupational Therapy Goals     Problem: Bathing     Dates: Start: 09/26/20       Goal: STG-Within one week, patient will bathe     Dates: Start: 09/26/20       Description: 1) Individualized Goal:  with supervision and remember to wash all body parts with only fading cues  2) Interventions:  OT Self Care/ADL, OT Cognitive Skill Dev, OT Neuro Re-Ed/Balance, OT Therapeutic Activity, OT Evaluation, and OT Therapeutic Exercise                  Problem: Dressing     Dates: Start: 09/26/20       Goal: STG-Within one week, patient will dress LB     Dates: Start: 09/26/20       Description:  1) Individualized Goal:  with supervision   2) Interventions:  OT Self Care/ADL, OT Cognitive Skill Dev, OT Neuro Re-Ed/Balance, OT Therapeutic Activity, OT Evaluation, and OT Therapeutic Exercise                  Problem: OT Long Term Goals     Dates: Start: 09/26/20       Goal: LTG-By discharge, patient will complete basic self care tasks     Dates: Start: 09/26/20       Description:  1) Individualized Goal:  with mod I  2) Interventions:  OT Self Care/ADL, OT Cognitive Skill Dev, OT Neuro Re-Ed/Balance, OT Therapeutic Activity, OT Evaluation, and OT Therapeutic Exercise          Goal: LTG-By discharge, patient will perform bathroom transfers     Dates: Start: 09/26/20       Description:  1) Individualized Goal:  with mod I  2) Interventions:  OT Self Care/ADL, OT Cognitive Skill Dev, OT Neuro Re-Ed/Balance, OT Therapeutic Activity, OT Evaluation, and OT Therapeutic Exercise                Problem: Toileting     Dates: Start: 09/26/20       Goal: STG-Within one week,  patient will complete toileting tasks     Dates: Start: 09/26/20       Description:  1) Individualized Goal:  with distant supervision   2) Interventions:  OT Self Care/ADL, OT Cognitive Skill Dev, OT Neuro Re-Ed/Balance, OT Therapeutic Activity, OT Evaluation, and OT Therapeutic Exercise

## 2020-09-28 NOTE — THERAPY
Speech Language Pathology  Video Swallow     Patient Name:  Jordan Alexander  AGE:  75 y.o., SEX:  male  Medical Record #:  [id number]  Today's Date: 9/28/2020     Objective       09/28/20 1301   History / Background Information   Prior Level of Function for Eating / Swallowing WFL   Diagnosis Acute encephalopathy   Onset Date Of Dysphagia 9/12/20   Dentition Edentulous  (upper and lower dentures)   Procedure   Patient Seated in  wheelchair    Seated at (Degrees) 90   Views Completed Lateral   Fluoroscopy Time 174.8 sec    Consistencies / Presentation Method   Consistencies / Presentation Method Tested   Mildly Thick (2) - (Nectar Thick) Cup   Thin (0) Cup;Teaspoon   Pureed (4) Teaspoon   Soft & Bite-Sized (6) - (Dysphagia III) Teaspoon   Regular (7) Teaspoon   Barium Tablet Cup  (thin liquid wash )   Oral Phase   Oral Phase X   Pureed (4) Oral Residue After the Swallow   Soft & Bite-Sized (6) - (Dysphagia III) Oral Residue After the Swallow;Premature Spillage Into Valleculae   Regular (7) Premature Spillage Into Valleculae   Pharyngeal Phase   Pharyngeal Phase X   Thin (0) Residue In Valleculae   Regular (7) Residue In Valleculae   Esophageal Phase   Esophageal Phase WDL   Compensatory Strategies Attempted   Compensatory Strategies Attempted Yes   Multiple Swallows Effective in clearing small amount of haryngeal residue    Impression   Dysphagia Present No   Prognosis   Prognosis for Improvement Not Applicable   Barriers to Improvement NA   Positive Indicators for Improvement NA   Recommendations   Diet / Liquid Recommendation Soft & Bite-Sized (6) - (Dysphagia III);Thin (0)   Medication Administration  Whole with Liquid Wash   Strategies / Precautions Small Bites;Small Sips;Cues to Slow Rate of Eating;Sitting Upright at 90 Degrees while Eating;Stay Upright at Least 30 Minutes After Meals;Oral Care After Meals;Monitor Temperature and Lung Sounds   Interventions Compensatory Safe Swallow Strategy Training;Therapeutic  Dining Program for Meals;Patient / Caregiver Education / Training   SLP Contact Information (Name / Extension) Aydin Bosscaroline MS, CCC-SLP/ ex [phone]   Video Tape Number [id number]   SLP Total Time Spent   SLP Individual Total Time Spent (Mins) 30   Charge Group   SLP Video Swallow / FEES Videofluoroscopic Evaluation       Assessment    MBSS completed.  Pt presented with a normal swallowing function with characteristics of normal aging noted: mild oral residue after the swallow; premature spillage to the valleculae (mech soft, regular); and mild residue in the valleculae with thin liquids and regular textures.  Pt was able to tolerate a barium capsule with a thin liquid was without difficulty.  Some incoordination with chewing noted.   Plan    Recommend upgrading pt to a 6- Soft & Bite Sized diet with thin liquids with trials of regular textures to be completed tomorrow to assess pt's ability to chew challenging textures before upgrading.

## 2020-09-28 NOTE — THERAPY
Speech Language Pathology  Daily Treatment     Patient Name: Jordan Alexander  Age:  75 y.o., Sex:  male  Medical Record #: [id number]  Today's Date: 9/28/2020     Precautions  Precautions: Fall Risk  Comments: memory deficits     Subjective    Pt was pleasant and cooperative during this ST session      Objective       09/28/20 1001   SLP Total Time Spent   SLP Individual Total Time Spent (Mins) 30   Treatment Charges   SLP Cognitive Skill Development First 15 Minutes 1   SLP Cognitive Skill Development Additional 15 Minutes 1       Assessment    Reviewed pt's current medications.  When given the names pt was able to recall the purpose of approximately 50% of them.  Pt reported that he usually does not use a pill box at home, but is willing to try one.      Strengths: Able to follow instructions, Independent prior level of function, Pleasant and cooperative, Motivated for self care and independence, Willingly participates in therapeutic activities  Barriers: Aspiration risk, Impaired functional cognition    Plan    Continue reviewing pt's medications, complete a medication sorting task     Speech Therapy Problems     Problem: Memory STGs     Dates: Start: 09/26/20       Goal: STG-Within one week, patient will     Dates: Start: 09/26/20       Description: 1) Individualized goal:  Recall daily events, and safety strategies with use of external aid, 80% Jo-Ann.   2) Interventions:  SLP Cognitive Skill Development                    Problem: Problem Solving STGs     Dates: Start: 09/26/20       Goal: STG-Within one week, patient will     Dates: Start: 09/26/20       Description: 1) Individualized goal:  complete the SCCAN, with additional goals as deemed appropriate.   2) Interventions:  SLP Cognitive Skill Development              Goal: STG-Within one week, patient will     Dates: Start: 09/27/20       Description: 1) Individualized goal:  Complete executive function tasks related to medication management/finance, with 80%  accuracy, given min verbal cues.    2) Interventions:  SLP Cognitive Skill Development and SLP Group Treatment                    Problem: Speech/Swallowing LTGs     Dates: Start: 09/26/20       Goal: LTG-By discharge, patient will safely swallow     Dates: Start: 09/26/20       Description: 1) Individualized goal:  regular textures, and thin liquids, without aspiration.   2) Interventions:  SLP Swallowing Dysfunction Treatment, SLP Oral Pharyngeal Evaluation, SLP Video Swallow Evaluation, and SLP Group Treatment              Goal: LTG-By discharge, patient will solve complex problem     Dates: Start: 09/26/20       Description: 1) Individualized goal:  Marco for safe discharge home.   2) Interventions:  SLP Aphasia Evaluation, SLP Cognitive Skill Development, and SLP Group Treatment                    Problem: Swallowing STGs     Dates: Start: 09/26/20       Goal: STG-Within one week, patient will     Dates: Start: 09/26/20       Description: 1) Individualized goal:  participate in a modified barium swallow study.   2) Interventions:  SLP Video Swallow Evaluation              Goal: STG-Within one week, patient will     Dates: Start: 09/26/20       Description: 1) Individualized goal:  tolerate dys2 trials without oral residue, or s/s of aspiration, over 2 meals.   2) Interventions:  SLP Swallowing Dysfunction Treatment and SLP Group Treatment

## 2020-09-28 NOTE — THERAPY
Physical Therapy   Daily Treatment     Patient Name: Jordan Alexander  Age:  75 y.o., Sex:  male  Medical Record #: [id number]  Today's Date: 9/28/2020     Precautions  Precautions: Fall Risk  Comments: memory deficits     Subjective    Pt seated in room, agreeable to PT       Objective       09/28/20 0901   Precautions   Precautions Fall Risk   Comments memory deficits    Pain 0 - 10 Group   Location Back   Location Orientation Lower   Therapist Pain Assessment 9;During Activity;4;Post Activity   Transfer Functional Level of Assist   Bed, Chair, Wheelchair Transfer Minimal Assist  (WC <> treatment mat )   Bed Chair Wheelchair Transfer Description Set-up of equipment;Supervision for safety  (SPT, min A for supine > sit only)   Supine Lower Body Exercise   Bridges 2 sets of 15   Hip Abduction 2 sets of 15   Hip Adduction  2 sets of 15   Straight Leg Raises 2 sets of 15   Short Arc Quad 2 sets of 15   Heel Slide 2 sets of 15   Ankle Pumps 2 sets of 15   Comments Above exercises completed on treatment mat with wedge under back for comfort    Bed Mobility    Supine to Sit Minimal Assist  (min A intially, SBA with education on log roll technique)   Sit to Supine Stand by Assist   Sit to Stand Stand by Assist   Scooting Stand by Assist   Interdisciplinary Plan of Care Collaboration   Patient Position at End of Therapy Seated;Tray Table within Reach;Call Light within Reach;Self Releasing Lap Belt Applied   PT Total Time Spent   PT Individual Total Time Spent (Mins) 60   PT Charge Group   PT Therapeutic Exercise 3   PT Therapeutic Activities 1     Pt attempted Nustep at level 3, reported 9/10 low back pain after 1:30 and activity discontinued.     Pt education provided regarding log roll technique for supine > sit transition, decreased assistance needed and decreased back pain with log roll technique.     Pt's WC switched for safety and comfort due to screw protruding through armrest, pt reported good fit and comfort with new WC.      Assessment    Pt motivated and cooperative with session, limited by low back pain.     Strengths: Able to follow instructions, Adequate strength, Good insight into deficits/needs, Independent prior level of function, Motivated for self care and independence, Pleasant and cooperative, Supportive family, Willingly participates in therapeutic activities  Barriers: Decreased endurance, Impaired activity tolerance, Impaired balance(lightheadedness)    Plan    Continue LE strength and endurance, low back pain management PRN, core stability training/ exercises, balance activities.     Physical Therapy Problems     Problem: Balance     Dates: Start: 09/26/20       Goal: STG-Within one week, patient will     Dates: Start: 09/26/20       Description: 1) Individualized goal:  tolerate Jimenez Balance Scale  2) Interventions:  PT Group Therapy, PT Gait Training, PT Therapeutic Exercises, PT Neuro Re-Ed/Balance, PT Therapeutic Activity, and PT Evaluation                  Problem: Mobility     Dates: Start: 09/26/20       Goal: STG-Within one week, patient will ambulate community distances     Dates: Start: 09/26/20       Description: 1) Individualized goal: 150 ft with no AD and supervision.  2) Interventions:  PT Group Therapy, PT Gait Training, PT Therapeutic Exercises, PT Neuro Re-Ed/Balance, PT Therapeutic Activity, and PT Evaluation          Goal: STG-Within one week, patient will ambulate up/down a curb     Dates: Start: 09/26/20       Description: 1) Individualized goal: with BUE support and CGA.  2) Interventions:  PT Group Therapy, PT Gait Training, PT Therapeutic Exercises, PT Neuro Re-Ed/Balance, PT Therapeutic Activity, and PT Evaluation                Problem: Mobility Transfers     Dates: Start: 09/26/20       Goal: STG-Within one week, patient will perform bed mobility     Dates: Start: 09/26/20       Description: 1) Individualized goal: independently.  2) Interventions:  PT Group Therapy, PT Gait Training, PT  Therapeutic Exercises, PT Neuro Re-Ed/Balance, PT Therapeutic Activity, and PT Evaluation          Goal: STG-Within one week, patient will transfer bed to chair     Dates: Start: 09/26/20       Description: 1) Individualized goal: with no AD and supervision.  2) Interventions:  PT Group Therapy, PT Gait Training, PT Therapeutic Exercises, PT Neuro Re-Ed/Balance, PT Therapeutic Activity, and PT Evaluation                Problem: PT-Long Term Goals     Dates: Start: 09/26/20       Goal: LTG-By discharge, patient will ambulate     Dates: Start: 09/26/20       Description: 1) Individualized goal: 200 ft with no AD mod I.  2) Interventions:  PT Group Therapy, PT Gait Training, PT Therapeutic Exercises, PT Neuro Re-Ed/Balance, PT Therapeutic Activity, and PT Evaluation          Goal: LTG-By discharge, patient will transfer one surface to another     Dates: Start: 09/26/20       Description: 1) Individualized goal: with no AD mod I.  2) Interventions:  PT Group Therapy, PT Gait Training, PT Therapeutic Exercises, PT Neuro Re-Ed/Balance, PT Therapeutic Activity, and PT Evaluation          Goal: LTG-By discharge, patient will perform home exercise program     Dates: Start: 09/26/20       Description: 1) Individualized goal: with handout mod I.  2) Interventions:  PT Group Therapy, PT Gait Training, PT Therapeutic Exercises, PT Neuro Re-Ed/Balance, PT Therapeutic Activity, and PT Evaluation          Goal: LTG-By discharge, patient will transfer in/out of a car     Dates: Start: 09/26/20       Description: 1) Individualized goal: with no AD and supervision.  2) Interventions:  PT Group Therapy, PT Gait Training, PT Therapeutic Exercises, PT Neuro Re-Ed/Balance, PT Therapeutic Activity, and PT Evaluation          Goal: LTG-By discharge, patient will     Dates: Start: 09/26/20       Description: 1) Individualized goal: improve Jimenez Balance Scale by >= 5 points to achieve MDC.  2) Interventions:  PT Group Therapy, PT Gait  Training, PT Therapeutic Exercises, PT Neuro Re-Ed/Balance, PT Therapeutic Activity, and PT Evaluation          Goal: LTG-By discharge, patient will     Dates: Start: 09/26/20       Description: 1) Individualized goal: negotiate 1-2 steps with BUE support and supervision.  2) Interventions:  PT Group Therapy, PT Gait Training, PT Therapeutic Exercises, PT Neuro Re-Ed/Balance, PT Therapeutic Activity, and PT Evaluation

## 2020-09-29 PROCEDURE — 97535 SELF CARE MNGMENT TRAINING: CPT

## 2020-09-29 PROCEDURE — A9270 NON-COVERED ITEM OR SERVICE: HCPCS | Performed by: NURSE PRACTITIONER

## 2020-09-29 PROCEDURE — 770010 HCHG ROOM/CARE - REHAB SEMI PRIVAT*

## 2020-09-29 PROCEDURE — 97530 THERAPEUTIC ACTIVITIES: CPT

## 2020-09-29 PROCEDURE — 700102 HCHG RX REV CODE 250 W/ 637 OVERRIDE(OP): Performed by: PHYSICAL MEDICINE & REHABILITATION

## 2020-09-29 PROCEDURE — 700102 HCHG RX REV CODE 250 W/ 637 OVERRIDE(OP): Performed by: NURSE PRACTITIONER

## 2020-09-29 PROCEDURE — A9270 NON-COVERED ITEM OR SERVICE: HCPCS | Performed by: PHYSICAL MEDICINE & REHABILITATION

## 2020-09-29 PROCEDURE — 97112 NEUROMUSCULAR REEDUCATION: CPT

## 2020-09-29 PROCEDURE — 97110 THERAPEUTIC EXERCISES: CPT

## 2020-09-29 PROCEDURE — 92526 ORAL FUNCTION THERAPY: CPT

## 2020-09-29 PROCEDURE — 99233 SBSQ HOSP IP/OBS HIGH 50: CPT | Performed by: PHYSICAL MEDICINE & REHABILITATION

## 2020-09-29 RX ORDER — TRAZODONE HYDROCHLORIDE 50 MG/1
50 TABLET ORAL
Status: DISCONTINUED | OUTPATIENT
Start: 2020-09-29 | End: 2020-10-06 | Stop reason: HOSPADM

## 2020-09-29 RX ORDER — LANOLIN ALCOHOL/MO/W.PET/CERES
3 CREAM (GRAM) TOPICAL
Status: DISCONTINUED | OUTPATIENT
Start: 2020-09-29 | End: 2020-10-02

## 2020-09-29 RX ORDER — POLYVINYL ALCOHOL 14 MG/ML
1 SOLUTION/ DROPS OPHTHALMIC
Status: DISCONTINUED | OUTPATIENT
Start: 2020-09-29 | End: 2020-10-06 | Stop reason: HOSPADM

## 2020-09-29 RX ADMIN — Medication 1 G: at 08:35

## 2020-09-29 RX ADMIN — ACETAMINOPHEN 500 MG: 500 TABLET, FILM COATED ORAL at 20:15

## 2020-09-29 RX ADMIN — APIXABAN 2.5 MG: 5 TABLET, FILM COATED ORAL at 08:35

## 2020-09-29 RX ADMIN — SIMVASTATIN 20 MG: 20 TABLET, FILM COATED ORAL at 20:15

## 2020-09-29 RX ADMIN — TRAZODONE HYDROCHLORIDE 50 MG: 50 TABLET ORAL at 22:05

## 2020-09-29 RX ADMIN — THIAMINE HCL TAB 100 MG 100 MG: 100 TAB at 08:35

## 2020-09-29 RX ADMIN — Medication 1 G: at 17:27

## 2020-09-29 RX ADMIN — MONTELUKAST 10 MG: 10 TABLET, FILM COATED ORAL at 08:35

## 2020-09-29 RX ADMIN — CHOLECALCIFEROL TAB 25 MCG (1000 UNIT) 4000 UNITS: 25 TAB at 08:34

## 2020-09-29 RX ADMIN — DOCUSATE SODIUM 50 MG AND SENNOSIDES 8.6 MG 2 TABLET: 8.6; 5 TABLET, FILM COATED ORAL at 08:35

## 2020-09-29 RX ADMIN — POLYVINYL ALCOHOL 1 DROP: 14 SOLUTION/ DROPS OPHTHALMIC at 14:36

## 2020-09-29 RX ADMIN — Medication 1 G: at 12:38

## 2020-09-29 RX ADMIN — SOTALOL HYDROCHLORIDE 80 MG: 80 TABLET ORAL at 17:27

## 2020-09-29 RX ADMIN — APIXABAN 2.5 MG: 5 TABLET, FILM COATED ORAL at 20:15

## 2020-09-29 RX ADMIN — HYDRALAZINE HYDROCHLORIDE 25 MG: 25 TABLET, FILM COATED ORAL at 14:02

## 2020-09-29 RX ADMIN — FERROUS SULFATE TAB 325 MG (65 MG ELEMENTAL FE) 325 MG: 325 (65 FE) TAB at 08:35

## 2020-09-29 RX ADMIN — ACETAMINOPHEN 500 MG: 500 TABLET, FILM COATED ORAL at 14:02

## 2020-09-29 RX ADMIN — ACETAMINOPHEN 500 MG: 500 TABLET, FILM COATED ORAL at 17:27

## 2020-09-29 RX ADMIN — ACETAMINOPHEN 500 MG: 500 TABLET, FILM COATED ORAL at 08:34

## 2020-09-29 RX ADMIN — SOTALOL HYDROCHLORIDE 80 MG: 80 TABLET ORAL at 05:34

## 2020-09-29 RX ADMIN — MELATONIN 3 MG: at 20:15

## 2020-09-29 ASSESSMENT — GAIT ASSESSMENTS
DEVIATION: BRADYKINETIC;DECREASED TOE OFF
GAIT LEVEL OF ASSIST: STAND BY ASSIST

## 2020-09-29 ASSESSMENT — PATIENT HEALTH QUESTIONNAIRE - PHQ9
2. FEELING DOWN, DEPRESSED, IRRITABLE, OR HOPELESS: NOT AT ALL
SUM OF ALL RESPONSES TO PHQ9 QUESTIONS 1 AND 2: 0
1. LITTLE INTEREST OR PLEASURE IN DOING THINGS: NOT AT ALL

## 2020-09-29 ASSESSMENT — BALANCE ASSESSMENTS
STANDING ON ONE LEG: 1
STANDING UNSUPPORTED ONE FOOT IN FRONT: 2
STANDING TO SITTING: 4
STANDING UNSUPPORTED WITH EYES CLOSED: 4
TURN 360 DEGREES: 2
LOOK OVER LEFT AND RIGHT SHOULDERS WHILE STANDING: 4
STANDING UNSUPPORTED: 2
SITTING UNSUPPORTED: 4
PLACE ALTERNATE FOOT ON STEP OR STOOL WHILE STANDING UNSUPPORTED: 3
PICK UP OBJECT FROM THE FLOOR FROM A STANDING POSITION: 4
SITTING TO STANDING: 4
STANDING UNSUPPORTED WITH FEET TOGETHER: 4
TRANSFERS: 4
REACHING FORWARD WITH OUTSTRETCHED ARM WHILE STANDING: 4

## 2020-09-29 ASSESSMENT — ACTIVITIES OF DAILY LIVING (ADL)
BED_CHAIR_WHEELCHAIR_TRANSFER_DESCRIPTION: INCREASED TIME;SUPERVISION FOR SAFETY
BED_CHAIR_WHEELCHAIR_TRANSFER_DESCRIPTION: INCREASED TIME;SET-UP OF EQUIPMENT;SUPERVISION FOR SAFETY;VERBAL CUEING

## 2020-09-29 NOTE — PROGRESS NOTES
"Rehab Progress Note     Encounter Date: 9/29/2020    CC: encephalopathy, decreased mobility    Interval Events (Subjective)  Patient sitting up in room. He reports some low SBP this morning with therapy and became definitely symptomatic when standing. He reports he thinks he is dehydrated from the fluid restriction. 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 Discussed will increase his fluid allowance but needs to monitor. Discussed will also need to recheck labs. Discussed we have IDT later today and that we would discuss discharge planning. He hope he will be able to go home soon. He reports his only other complaint is poor sleep and nothing to give him at night. Discussed would add medication.     IDT Team Meeting 9/29/2020    I, Td Andrews M.D., was present and led the interdisciplinary team conference on 9/29/2020.  I led the IDT conference and agree with the IDT conference documentation and plan of care as noted below.     RN:  Diet Regular   % Meal     Pain    Sleep    Bowel Continent   Bladder Continent   In's & Out's    Poor sleep    PT:  Bed Mobility    Transfers    Mobility SBA but getting low SBP and required to sit   Stairs    Balance improving    OT:  Eating    Grooming    Bathing    UB Dressing    LB Dressing supervs   Toileting supervs   Shower & Transfer CGA   Memory biggest barrier   Back pain with standing     SLP:  MBSS regular to easy to chew diet  ModA for memory  3 of 5 goals    CM:  Continues to work on disposition and DME needs.      DC/Disposition:  10/6/20    Objective:  VITAL SIGNS: BP (!) 98/53   Pulse 64   Temp 36.2 °C (97.1 °F) (Temporal)   Resp 18   Ht 1.702 m (5' 7\")   Wt 70.5 kg (155 lb 6.8 oz)   SpO2 95%   BMI 24.34 kg/m²   Gen: NAD  Psych: Mood and affect appropriate  CV: RRR, no edema  Resp: CTAB, no upper airway sounds  Abd: NTND  Neuro: AOx3, following commands  Unchanged from 9/28/20    Recent Results (from the " past 72 hour(s))   Renal Function Panel    Collection Time: 09/26/20  1:15 PM   Result Value Ref Range    Sodium 129 (L) 135 - 145 mmol/L    Potassium 4.5 3.6 - 5.5 mmol/L    Chloride 92 (L) 96 - 112 mmol/L    Co2 23 20 - 33 mmol/L    Glucose 161 (H) 65 - 99 mg/dL    Creatinine 1.10 0.50 - 1.40 mg/dL    Bun 41 (H) 8 - 22 mg/dL    Calcium 9.4 8.5 - 10.5 mg/dL    Phosphorus 3.7 2.5 - 4.5 mg/dL    Albumin 3.8 3.2 - 4.9 g/dL   ESTIMATED GFR    Collection Time: 09/26/20  1:15 PM   Result Value Ref Range    GFR If African American >60 >60 mL/min/1.73 m 2    GFR If Non African American >60 >60 mL/min/1.73 m 2   Renal Function Panel    Collection Time: 09/27/20  5:09 AM   Result Value Ref Range    Sodium 132 (L) 135 - 145 mmol/L    Potassium 4.1 3.6 - 5.5 mmol/L    Chloride 94 (L) 96 - 112 mmol/L    Co2 25 20 - 33 mmol/L    Glucose 97 65 - 99 mg/dL    Creatinine 0.88 0.50 - 1.40 mg/dL    Bun 39 (H) 8 - 22 mg/dL    Calcium 9.6 8.5 - 10.5 mg/dL    Phosphorus 3.8 2.5 - 4.5 mg/dL    Albumin 3.8 3.2 - 4.9 g/dL   MAGNESIUM    Collection Time: 09/27/20  5:09 AM   Result Value Ref Range    Magnesium 1.6 1.5 - 2.5 mg/dL   ESTIMATED GFR    Collection Time: 09/27/20  5:09 AM   Result Value Ref Range    GFR If African American >60 >60 mL/min/1.73 m 2    GFR If Non African American >60 >60 mL/min/1.73 m 2       Current Facility-Administered Medications   Medication Frequency   • vitamin D (cholecalciferol) tablet 4,000 Units DAILY   • senna-docusate (PERICOLACE or SENOKOT S) 8.6-50 MG per tablet 2 Tab BID    And   • polyethylene glycol/lytes (MIRALAX) PACKET 1 Packet QDAY PRN    And   • magnesium hydroxide (MILK OF MAGNESIA) suspension 30 mL QDAY PRN    And   • bisacodyl (DULCOLAX) suppository 10 mg QDAY PRN   • Pharmacy Consult Request ...Pain Management Review 1 Each PHARMACY TO DOSE   • oxyCODONE immediate-release (ROXICODONE) tablet 2.5 mg Q3HRS PRN   • Respiratory Therapy Consult Continuous RT   • acetaminophen (TYLENOL) tablet 500  mg 4X/DAY   • amLODIPine (NORVASC) tablet 10 mg QHS   • apixaban (ELIQUIS) tablet 2.5 mg BID   • budesonide-formoterol (SYMBICORT) 160-4.5 MCG/ACT inhaler 2 Puff BID PRN   • ferrous sulfate tablet 325 mg Q48HRS   • hydrALAZINE (APRESOLINE) tablet 25 mg Q8HRS   • lidocaine (LIDODERM) 5 % 1 Patch QDAY PRN   • montelukast (SINGULAIR) tablet 10 mg DAILY   • simvastatin (ZOCOR) tablet 20 mg Q EVENING   • sodium chloride (SALT) tablet 1 g TID WITH MEALS   • sotalol (BETAPACE) tablet 80 mg TWICE DAILY   • thiamine tablet 100 mg DAILY       Orders Placed This Encounter   Procedures   • Diet Order Regular     Standing Status:   Standing     Number of Occurrences:   1     Order Specific Question:   Diet:     Answer:   Regular [1]     Order Specific Question:   Nutrient modifications:     Answer:   High Protein [4]     Order Specific Question:   Electrolyte modifications:     Answer:   High Potassium [4]     Order Specific Question:   Texture Modifier     Answer:   Level 6 - Soft & Bite Sized (Dysphagia 3)     Order Specific Question:   Liquid level     Answer:   Level 0 - Thin     Order Specific Question:   Consistency/Fluid modifications:     Answer:   1000 ml Fluid Restriction [7]       Assessment:  Active Hospital Problems    Diagnosis   • *Acute encephalopathy   • Hypokalemia   • Back pain   • Hypomagnesemia   • Paroxysmal atrial fibrillation (HCC)       Medical Decision Making and Plan:  Acute encephalopathy  - Etiology hypertensive vs metabolic   - No stroke found   - EEG with encephalopathy, no seizure captured.   - Admit to IPR   - Evals by PT/OT and SLP on arrival depending on schedule      Dypshagia  SLP to consult.  MBS on 9/28/20 - advanced to regular    Pain  - Tylenol , lidoderm patch     PAF   - on eliquis, sotalol, amlodipine, apixaban, hydralazine, simvastatin     Pulm  - montelukast      Hyponatremia/SIADH  - Na 127 on day of admission   - 1L fluid restriction -> 1.5 L as becoming volume depleted  - Salt  tabs 1g TID, continue  - Appreciate Nephrology recommendations. Signed off, still on salt tabs with meals     Hypokalemia  - Corrected to 4.5 on day of admission   - Nephrology will continue to follow and titrate   - High protein and high K diet     Thrombocytosis  - Plt 582 on admission   - monitor      Monoclonal Protein  Per nephrology recommended Hematology referral for MATTIE shows a band in IgG kappa suggestive of an early monoclonal protein. FLC ratio 1.74  -Referral to Hematology     Anemia:   -Hgb 12.1 on admission   - improving, continue to monitor     Hypomagnesemia  Improved, will continue to monitor    Insomnia  Start on Melatonin, if needed PRN Trazodone    Vitamin D Deficiency:  26 on admission. Started on 4000 U    DVT Ppx   Patient on Eliquis    Total time:  36 minutes.  I spent greater than 50% of the time for patient care, counseling, and coordination on this date, including unit/floor time, and face-to-face time with the patient as per interval events and assessment and plan above. Topics discussed included hypotension, increase fluid restriction and recheck AM labs, insomnia and start on melatonin. Discussed also discharge planning. Patient was discussed separately in IDT today; please see details above.    Td Andrews M.D.

## 2020-09-29 NOTE — DISCHARGE PLANNING
"Met w/ patient at bedside to review IDT recommendations & targeted DC date 10.6.2020. Patient \"pleased\" w/ targeted DC date. Reviewed DC destination. Plans to go to [place] for short time, then onto [place] for MD f/u appt (already scheduled w/ cardiology), eventually returning to [place] later in the month. States \"my wife will do the driving now.\" Discussed OP therapy mgmt post acute. Will review w/ wife for options & alternatives. Reviewed no DME anticipated. Questions answered. Emotional support provided.  "

## 2020-09-29 NOTE — CARE PLAN
Problem: Communication  Goal: The ability to communicate needs accurately and effectively will improve  Outcome: PROGRESSING AS EXPECTED  Note: Pt is able to communicate his needs effectively to staff.      Problem: Safety  Goal: Will remain free from injury  Outcome: PROGRESSING AS EXPECTED  Note: Pt uses call light consistently for staff assistance. Pt has good safety awareness, no impulsivity observed.      Problem: Infection  Goal: Will remain free from infection  Outcome: PROGRESSING SLOWER THAN EXPECTED  Note: UA from 9/25 was positive for bacteria

## 2020-09-29 NOTE — PROGRESS NOTES
"Rehab Progress Note     Encounter Date: 9/28/2020    CC: encephalopathy, decreased mobility    Interval Events (Subjective)  Patient sitting up in bed visiting with wife. He reports his thinking is much clearer. Per wife he has been making good progress in just the past few days. He denies pain. Reviewed nephrology recommendations including referral to hematology for SPEP results.  Discussed would place an outpatient referral at discharge. Otherwise UA checked on 9/25/20 and had moderate bacteria but no WBC or LE. No culture noted in chart. Discussed may repeat if he becomes symptomatic. Otherwise reviewed labs including mild hyponatremia on Na tabs.  He has questions about swallow study today, discussed with patient and wife. Denies cough. Notified by SLP of passing swallow study.     Objective:  VITAL SIGNS: /68   Pulse 71   Temp 36.3 °C (97.3 °F) (Temporal)   Resp 18   Ht 1.702 m (5' 7\")   Wt 70.5 kg (155 lb 6.8 oz)   SpO2 92%   BMI 24.34 kg/m²   Gen: NAD  Psych: Mood and affect appropriate  CV: RRR, no edema  Resp: CTAB, no upper airway sounds  Abd: NTND  Neuro: AOx3, following commands    Recent Results (from the past 72 hour(s))   Urinalysis    Collection Time: 09/25/20 11:50 PM    Specimen: Urine, Clean Catch   Result Value Ref Range    Color Yellow     Character Clear     Specific Gravity 1.031 <1.035    Ph 6.5 5.0 - 8.0    Glucose Negative Negative mg/dL    Ketones Trace (A) Negative mg/dL    Protein 30 (A) Negative mg/dL    Bilirubin Negative Negative    Urobilinogen, Urine 1.0 Negative    Nitrite Negative Negative    Leukocyte Esterase Negative Negative    Occult Blood Negative Negative    Micro Urine Req Microscopic    URINE MICROSCOPIC (W/UA)    Collection Time: 09/25/20 11:50 PM   Result Value Ref Range    WBC 0-2 (A) /hpf    RBC 2-5 (A) /hpf    Bacteria Moderate (A) None /hpf    Epithelial Cells Negative /hpf    Hyaline Cast 0-2 /lpf   Prealbumin    Collection Time: 09/26/20  5:40 AM "   Result Value Ref Range    Pre-Albumin 19.1 18.0 - 38.0 mg/dL   Vitamin D, 25-hydroxy (blood)    Collection Time: 09/26/20  5:40 AM   Result Value Ref Range    25-Hydroxy   Vitamin D 25 26 (L) 30 - 100 ng/mL   Renal Function Panel    Collection Time: 09/26/20  1:15 PM   Result Value Ref Range    Sodium 129 (L) 135 - 145 mmol/L    Potassium 4.5 3.6 - 5.5 mmol/L    Chloride 92 (L) 96 - 112 mmol/L    Co2 23 20 - 33 mmol/L    Glucose 161 (H) 65 - 99 mg/dL    Creatinine 1.10 0.50 - 1.40 mg/dL    Bun 41 (H) 8 - 22 mg/dL    Calcium 9.4 8.5 - 10.5 mg/dL    Phosphorus 3.7 2.5 - 4.5 mg/dL    Albumin 3.8 3.2 - 4.9 g/dL   ESTIMATED GFR    Collection Time: 09/26/20  1:15 PM   Result Value Ref Range    GFR If African American >60 >60 mL/min/1.73 m 2    GFR If Non African American >60 >60 mL/min/1.73 m 2   Renal Function Panel    Collection Time: 09/27/20  5:09 AM   Result Value Ref Range    Sodium 132 (L) 135 - 145 mmol/L    Potassium 4.1 3.6 - 5.5 mmol/L    Chloride 94 (L) 96 - 112 mmol/L    Co2 25 20 - 33 mmol/L    Glucose 97 65 - 99 mg/dL    Creatinine 0.88 0.50 - 1.40 mg/dL    Bun 39 (H) 8 - 22 mg/dL    Calcium 9.6 8.5 - 10.5 mg/dL    Phosphorus 3.8 2.5 - 4.5 mg/dL    Albumin 3.8 3.2 - 4.9 g/dL   MAGNESIUM    Collection Time: 09/27/20  5:09 AM   Result Value Ref Range    Magnesium 1.6 1.5 - 2.5 mg/dL   ESTIMATED GFR    Collection Time: 09/27/20  5:09 AM   Result Value Ref Range    GFR If African American >60 >60 mL/min/1.73 m 2    GFR If Non African American >60 >60 mL/min/1.73 m 2       Current Facility-Administered Medications   Medication Frequency   • vitamin D (cholecalciferol) tablet 4,000 Units DAILY   • senna-docusate (PERICOLACE or SENOKOT S) 8.6-50 MG per tablet 2 Tab BID    And   • polyethylene glycol/lytes (MIRALAX) PACKET 1 Packet QDAY PRN    And   • magnesium hydroxide (MILK OF MAGNESIA) suspension 30 mL QDAY PRN    And   • bisacodyl (DULCOLAX) suppository 10 mg QDAY PRN   • Pharmacy Consult Request ...Pain  Management Review 1 Each PHARMACY TO DOSE   • oxyCODONE immediate-release (ROXICODONE) tablet 2.5 mg Q3HRS PRN   • Respiratory Therapy Consult Continuous RT   • acetaminophen (TYLENOL) tablet 500 mg 4X/DAY   • amLODIPine (NORVASC) tablet 10 mg QHS   • apixaban (ELIQUIS) tablet 2.5 mg BID   • budesonide-formoterol (SYMBICORT) 160-4.5 MCG/ACT inhaler 2 Puff BID PRN   • ferrous sulfate tablet 325 mg Q48HRS   • hydrALAZINE (APRESOLINE) tablet 25 mg Q8HRS   • lidocaine (LIDODERM) 5 % 1 Patch QDAY PRN   • montelukast (SINGULAIR) tablet 10 mg DAILY   • simvastatin (ZOCOR) tablet 20 mg Q EVENING   • sodium chloride (SALT) tablet 1 g TID WITH MEALS   • sotalol (BETAPACE) tablet 80 mg TWICE DAILY   • thiamine tablet 100 mg DAILY       Orders Placed This Encounter   Procedures   • Diet Order Regular     Standing Status:   Standing     Number of Occurrences:   1     Order Specific Question:   Diet:     Answer:   Regular [1]     Order Specific Question:   Nutrient modifications:     Answer:   High Protein [4]     Order Specific Question:   Electrolyte modifications:     Answer:   High Potassium [4]     Order Specific Question:   Texture Modifier     Answer:   Level 6 - Soft & Bite Sized (Dysphagia 3)     Order Specific Question:   Liquid level     Answer:   Level 0 - Thin     Order Specific Question:   Consistency/Fluid modifications:     Answer:   1000 ml Fluid Restriction [7]       Assessment:  Active Hospital Problems    Diagnosis   • *Acute encephalopathy   • Hypokalemia   • Back pain   • Hypomagnesemia   • Paroxysmal atrial fibrillation (HCC)       Medical Decision Making and Plan:  Acute encephalopathy  - Etiology hypertensive vs metabolic   - No stroke found   - EEG with encephalopathy, no seizure captured.   - Admit to IPR   - Evals by PT/OT and SLP on arrival depending on schedule      Dypshagia  SLP to consult.  MBS on 9/28/20 - advanced to regular    Pain  - Tylenol , lidoerm patch     PAF   - on eliquis, sotalol,  amlodipine, apixaban, hydralazine, simvastatin     Pulm  - montelukast      Hyponatremia  - Na 127 on day of admission   - 1L fluid restriction   - Salt tabs 1g TID  - Appreciate Nephrology recommendations. Signed off, still on salt tabs with meals     Hypokalemia  - Corrected to 4.5 on day of admission   - Nephrology will continue to follow and titrate   - High protein and high K diet     Thrombocytosis  - Plt 582 on admission   - monitor      Monoclonal Protein  Per nephrology recommended Hematology referral for MATTIE shows a band in IgG kappa suggestive of an early monoclonal protein. FLC ratio 1.74  -Referral to Hematology     Anemia:   -Hgb 12.1 on admission   - improving, continue to monitor     Hypomagnesemia  Improved, will continue to monitor    Vitamin D Deficiency:  26 on admission. Started on 4000 U    DVT Ppx   Patient on Eliquis    Total time:  26 minutes.  I spent greater than 50% of the time for patient care and coordination on this date, including unit/floor time, and face-to-face time with the patient as per assessment and plan above. Discussion included improving cognition, referral to Hematology, improving electrolyte imbalance, and reviewed dysphagia and MBSS with patient/wife.     Td Andrews M.D.

## 2020-09-29 NOTE — THERAPY
"Physical Therapy   Daily Treatment     Patient Name: Jordan Alexander  Age:  75 y.o., Sex:  male  Medical Record #: [id number]  Today's Date: 9/29/2020     Precautions  Precautions: Fall Risk  Comments: memory deficits    Subjective    Pt sleeping in bed, agreeable to PT upon waking.     Objective       09/29/20 1301   Precautions   Precautions Fall Risk   Comments memory deficits   Gait Functional Level of Assist    Gait Level Of Assist Stand by Assist   Assistive Device None   Distance (Feet)   (368 ft and 232 ft )   Deviation Bradykinetic;Decreased Toe Off  (1 time \"catching toes on the floor\", maintained balance I)   Transfer Functional Level of Assist   Bed, Chair, Wheelchair Transfer Stand by Assist   Bed Chair Wheelchair Transfer Description Increased time;Supervision for safety   Jimenez Balance Scale   Standing On One Leg (Score 0-4) 1   Retrieving Objects From Floor (Score 0-4) 4   Turning 360 Degrees (Score 0-4) 2   Stool Stepping (Score 0-4) 3   Reaching Forward While Standing (Score 0-4) 4   Interdisciplinary Plan of Care Collaboration   Patient Position at End of Therapy In Bed;Call Light within Reach;Tray Table within Reach   PT Total Time Spent   PT Individual Total Time Spent (Mins) 30   PT Charge Group   PT Neuromuscular Re-Education / Balance 1   PT Therapeutic Activities 1     Pt education/ discussion regarding Jimenez score and current balance impairments.     Assessment    Pt's completed Jimenez Balance Assessment with a score of 46/56, indicating low fall risk.     Strengths: Able to follow instructions, Adequate strength, Good insight into deficits/needs, Independent prior level of function, Motivated for self care and independence, Pleasant and cooperative, Supportive family, Willingly participates in therapeutic activities  Barriers: Decreased endurance, Impaired activity tolerance, Impaired balance(lightheadedness)    Plan    LE strength and endurance, balance activities as able, ambulation without AD, " assess curbs/ stairs.     Physical Therapy Problems     Problem: Balance     Dates: Start: 09/26/20       Goal: STG-Within one week, patient will     Dates: Start: 09/26/20       Description: 1) Individualized goal:  tolerate Jimenez Balance Scale  2) Interventions:  PT Group Therapy, PT Gait Training, PT Therapeutic Exercises, PT Neuro Re-Ed/Balance, PT Therapeutic Activity, and PT Evaluation      Note:     Goal Note filed on 09/29/20 1107 by Jocelyn Fernandez, PT    To be completed this afternoon                        Problem: Mobility     Dates: Start: 09/26/20       Goal: STG-Within one week, patient will ambulate community distances     Dates: Start: 09/26/20       Description: 1) Individualized goal: 150 ft with no AD and supervision.  2) Interventions:  PT Group Therapy, PT Gait Training, PT Therapeutic Exercises, PT Neuro Re-Ed/Balance, PT Therapeutic Activity, and PT Evaluation    Note:     Goal Note filed on 09/29/20 1107 by Jocelyn Fernandez, PT    CGA no AD                  Goal: STG-Within one week, patient will ambulate up/down a curb     Dates: Start: 09/26/20       Description: 1) Individualized goal: with BUE support and CGA.  2) Interventions:  PT Group Therapy, PT Gait Training, PT Therapeutic Exercises, PT Neuro Re-Ed/Balance, PT Therapeutic Activity, and PT Evaluation    Note:     Goal Note filed on 09/29/20 1107 by Jocelyn Fernandez, PT    To be assessed                         Problem: Mobility Transfers     Dates: Start: 09/26/20       Goal: STG-Within one week, patient will perform bed mobility     Dates: Start: 09/26/20       Description: 1) Individualized goal: independently.  2) Interventions:  PT Group Therapy, PT Gait Training, PT Therapeutic Exercises, PT Neuro Re-Ed/Balance, PT Therapeutic Activity, and PT Evaluation    Note:     Goal Note filed on 09/29/20 1107 by Jocelyn Fernandez, PT    To be assessed                   Goal: STG-Within one week, patient will transfer bed to chair     Dates: Start: 09/26/20        Description: 1) Individualized goal: with no AD and supervision.  2) Interventions:  PT Group Therapy, PT Gait Training, PT Therapeutic Exercises, PT Neuro Re-Ed/Balance, PT Therapeutic Activity, and PT Evaluation    Note:     Goal Note filed on 09/29/20 1107 by Jocelyn Fernandez, PT    To be assessed                         Problem: PT-Long Term Goals     Dates: Start: 09/26/20       Goal: LTG-By discharge, patient will ambulate     Dates: Start: 09/26/20       Description: 1) Individualized goal: 200 ft with no AD mod I.  2) Interventions:  PT Group Therapy, PT Gait Training, PT Therapeutic Exercises, PT Neuro Re-Ed/Balance, PT Therapeutic Activity, and PT Evaluation          Goal: LTG-By discharge, patient will transfer one surface to another     Dates: Start: 09/26/20       Description: 1) Individualized goal: with no AD mod I.  2) Interventions:  PT Group Therapy, PT Gait Training, PT Therapeutic Exercises, PT Neuro Re-Ed/Balance, PT Therapeutic Activity, and PT Evaluation          Goal: LTG-By discharge, patient will perform home exercise program     Dates: Start: 09/26/20       Description: 1) Individualized goal: with handout mod I.  2) Interventions:  PT Group Therapy, PT Gait Training, PT Therapeutic Exercises, PT Neuro Re-Ed/Balance, PT Therapeutic Activity, and PT Evaluation          Goal: LTG-By discharge, patient will transfer in/out of a car     Dates: Start: 09/26/20       Description: 1) Individualized goal: with no AD and supervision.  2) Interventions:  PT Group Therapy, PT Gait Training, PT Therapeutic Exercises, PT Neuro Re-Ed/Balance, PT Therapeutic Activity, and PT Evaluation          Goal: LTG-By discharge, patient will     Dates: Start: 09/26/20       Description: 1) Individualized goal: improve Jimenez Balance Scale by >= 5 points to achieve MDC.  2) Interventions:  PT Group Therapy, PT Gait Training, PT Therapeutic Exercises, PT Neuro Re-Ed/Balance, PT Therapeutic Activity, and PT Evaluation           Goal: LTG-By discharge, patient will     Dates: Start: 09/26/20       Description: 1) Individualized goal: negotiate 1-2 steps with BUE support and supervision.  2) Interventions:  PT Group Therapy, PT Gait Training, PT Therapeutic Exercises, PT Neuro Re-Ed/Balance, PT Therapeutic Activity, and PT Evaluation

## 2020-09-29 NOTE — CARE PLAN
Problem: Safety  Goal: Will remain free from injury  Outcome: PROGRESSING AS EXPECTED  Note: Reviewed fall and safety precautions with patient and reminded patient to call for assistance before getting out of bed. Patient verbalized understanding and has not attempted self transfer this shift.      Problem: Pain Management  Goal: Pain level will decrease to patient's comfort goal  Outcome: PROGRESSING AS EXPECTED  Note: Patient denied any pain or discomfort during shift.

## 2020-09-29 NOTE — THERAPY
Speech Language Pathology  Daily Treatment     Patient Name: Jordan Alexander  Age:  75 y.o., Sex:  male  Medical Record #: [id number]  Today's Date: 9/29/2020     Precautions  Precautions: Fall Risk  Comments: memory deficits    Subjective    Patient was willing to participate. Seen at 0800 and 1130 for dysphagia treatment.      Objective       09/29/20 0802   Dysphagia    Diet / Liquid Recommendation Regular - Easy to Chew (7);Thin (0)   Nutritional Liquid Intake Rating Scale Non thickened beverages   Nutritional Food Intake Rating Scale Total oral diet with multiple consistencies without special preparation but with specific food limitations   SLP Total Time Spent   SLP Individual Total Time Spent (Mins) 60   Treatment Charges   SLP Swallowing Dysfunction Treatment Swallowing Dysfunction Treatment       Assessment    Patient was assessed with regular diet textures. Prolonged mastication, however no overt s/sx of aspiration were noted. Patient demonstrated use of safe swallow strategies independently.   Patient reports he prefers easy to chew textures.   Strengths: Able to follow instructions, Independent prior level of function, Pleasant and cooperative, Motivated for self care and independence, Willingly participates in therapeutic activities  Barriers: Aspiration risk, Impaired functional cognition    Plan    Upgrade to regular easy to chew textures. D/c t-dine.     Speech Therapy Problems     Problem: Memory STGs     Dates: Start: 09/26/20       Goal: STG-Within one week, patient will     Dates: Start: 09/26/20       Description: 1) Individualized goal:  Recall daily events, and safety strategies with use of external aid, 80% Jo-Ann.   2) Interventions:  SLP Cognitive Skill Development        Note:     Goal Note filed on 09/28/20 1537 by Aydin Foster MS,CCC-SLP    Continue to target                         Problem: Problem Solving STGs     Dates: Start: 09/26/20       Goal: STG-Within one week, patient will     Dates:  Start: 09/27/20       Description: 1) Individualized goal:  Complete executive function tasks related to medication management/finance, with 80% accuracy, given min verbal cues.    2) Interventions:  SLP Cognitive Skill Development and SLP Group Treatment        Note:     Goal Note filed on 09/28/20 1537 by Aydin Foster MS,CCC-SLP    Continue to target                         Problem: Speech/Swallowing LTGs     Dates: Start: 09/26/20       Goal: LTG-By discharge, patient will safely swallow     Dates: Start: 09/26/20       Description: 1) Individualized goal:  regular textures, and thin liquids, without aspiration.   2) Interventions:  SLP Swallowing Dysfunction Treatment, SLP Oral Pharyngeal Evaluation, SLP Video Swallow Evaluation, and SLP Group Treatment              Goal: LTG-By discharge, patient will solve complex problem     Dates: Start: 09/26/20       Description: 1) Individualized goal:  Marco for safe discharge home.   2) Interventions:  SLP Aphasia Evaluation, SLP Cognitive Skill Development, and SLP Group Treatment

## 2020-09-29 NOTE — THERAPY
Physical Therapy   Daily Treatment     Patient Name: Jordan Alexander  Age:  75 y.o., Sex:  male  Medical Record #: [id number]  Today's Date: 9/29/2020     Precautions  Precautions: Fall Risk  Comments: memory deficits    Subjective    Pt seated in therapy gym, care passed from OT.     Objective       09/29/20 1031   Precautions   Precautions Fall Risk   Comments memory deficits   Vitals   Patient BP Position Sitting   Blood Pressure  (!) 98/53   Vitals Comments Pt reported feeling lightheaded with standing, unable to obtain BP reading in standing   Jimenez Balance Scale   Sitting Unsupported (Score 0-4) 4  (low back pain)   Change Of Positon: Sitting To Standing (Score 0-4) 4   Change Of Positon: Standing To Sitting (Score 0-4) 4   Transfers (Score 0-4) 4   Standing Unsupported (Score 0-4) 2  (limited by dizziness)   Standing With Eyes Closed (Score 0-4) 4   Standing With Feet Together (Score 0-4) 4   Tandem Standing (Score 0-4) 2   Turning Trunk (Feet Fixed) (Score 0-4) 4   Interdisciplinary Plan of Care Collaboration   IDT Collaboration with  Speech Therapist   Patient Position at End of Therapy Seated;Call Light within Reach;Tray Table within Reach   Collaboration Comments re: upgrade to thin liquids   PT Total Time Spent   PT Individual Total Time Spent (Mins) 30   PT Charge Group   PT Neuromuscular Re-Education / Balance 1   PT Therapeutic Activities 1       Assessment    Pt began Jimenez Balance Assessment, however was limited by dizziness/ light-headedness. PT unable to obtain accurate reading of BP in standing.     Strengths: Able to follow instructions, Adequate strength, Good insight into deficits/needs, Independent prior level of function, Motivated for self care and independence, Pleasant and cooperative, Supportive family, Willingly participates in therapeutic activities  Barriers: Decreased endurance, Impaired activity tolerance, Impaired balance(lightheadedness)    Plan    Finish Jimenez Balance Asessement, LE  strength and endurance, balance activities as able, ambulation without AD.     Physical Therapy Problems     Problem: Balance     Dates: Start: 09/26/20       Goal: STG-Within one week, patient will     Dates: Start: 09/26/20       Description: 1) Individualized goal:  tolerate Jimenez Balance Scale  2) Interventions:  PT Group Therapy, PT Gait Training, PT Therapeutic Exercises, PT Neuro Re-Ed/Balance, PT Therapeutic Activity, and PT Evaluation      Note:     Goal Note filed on 09/29/20 1107 by Jocelyn Fernandez, PT    To be completed this afternoon                        Problem: Mobility     Dates: Start: 09/26/20       Goal: STG-Within one week, patient will ambulate community distances     Dates: Start: 09/26/20       Description: 1) Individualized goal: 150 ft with no AD and supervision.  2) Interventions:  PT Group Therapy, PT Gait Training, PT Therapeutic Exercises, PT Neuro Re-Ed/Balance, PT Therapeutic Activity, and PT Evaluation    Note:     Goal Note filed on 09/29/20 1107 by Jocelyn Fernandez, PT    CGA no AD                  Goal: STG-Within one week, patient will ambulate up/down a curb     Dates: Start: 09/26/20       Description: 1) Individualized goal: with BUE support and CGA.  2) Interventions:  PT Group Therapy, PT Gait Training, PT Therapeutic Exercises, PT Neuro Re-Ed/Balance, PT Therapeutic Activity, and PT Evaluation    Note:     Goal Note filed on 09/29/20 1107 by Jocelyn Fernandez, PT    To be assessed                         Problem: Mobility Transfers     Dates: Start: 09/26/20       Goal: STG-Within one week, patient will perform bed mobility     Dates: Start: 09/26/20       Description: 1) Individualized goal: independently.  2) Interventions:  PT Group Therapy, PT Gait Training, PT Therapeutic Exercises, PT Neuro Re-Ed/Balance, PT Therapeutic Activity, and PT Evaluation    Note:     Goal Note filed on 09/29/20 1107 by Jocelyn Fernandez, PT    To be assessed                   Goal: STG-Within one week, patient will  transfer bed to chair     Dates: Start: 09/26/20       Description: 1) Individualized goal: with no AD and supervision.  2) Interventions:  PT Group Therapy, PT Gait Training, PT Therapeutic Exercises, PT Neuro Re-Ed/Balance, PT Therapeutic Activity, and PT Evaluation    Note:     Goal Note filed on 09/29/20 1107 by Jocelyn Fernandez, PT    To be assessed                         Problem: PT-Long Term Goals     Dates: Start: 09/26/20       Goal: LTG-By discharge, patient will ambulate     Dates: Start: 09/26/20       Description: 1) Individualized goal: 200 ft with no AD mod I.  2) Interventions:  PT Group Therapy, PT Gait Training, PT Therapeutic Exercises, PT Neuro Re-Ed/Balance, PT Therapeutic Activity, and PT Evaluation          Goal: LTG-By discharge, patient will transfer one surface to another     Dates: Start: 09/26/20       Description: 1) Individualized goal: with no AD mod I.  2) Interventions:  PT Group Therapy, PT Gait Training, PT Therapeutic Exercises, PT Neuro Re-Ed/Balance, PT Therapeutic Activity, and PT Evaluation          Goal: LTG-By discharge, patient will perform home exercise program     Dates: Start: 09/26/20       Description: 1) Individualized goal: with handout mod I.  2) Interventions:  PT Group Therapy, PT Gait Training, PT Therapeutic Exercises, PT Neuro Re-Ed/Balance, PT Therapeutic Activity, and PT Evaluation          Goal: LTG-By discharge, patient will transfer in/out of a car     Dates: Start: 09/26/20       Description: 1) Individualized goal: with no AD and supervision.  2) Interventions:  PT Group Therapy, PT Gait Training, PT Therapeutic Exercises, PT Neuro Re-Ed/Balance, PT Therapeutic Activity, and PT Evaluation          Goal: LTG-By discharge, patient will     Dates: Start: 09/26/20       Description: 1) Individualized goal: improve Jimenez Balance Scale by >= 5 points to achieve MDC.  2) Interventions:  PT Group Therapy, PT Gait Training, PT Therapeutic Exercises, PT Neuro  Re-Ed/Balance, PT Therapeutic Activity, and PT Evaluation          Goal: LTG-By discharge, patient will     Dates: Start: 09/26/20       Description: 1) Individualized goal: negotiate 1-2 steps with BUE support and supervision.  2) Interventions:  PT Group Therapy, PT Gait Training, PT Therapeutic Exercises, PT Neuro Re-Ed/Balance, PT Therapeutic Activity, and PT Evaluation

## 2020-09-29 NOTE — CARE PLAN
Problem: Bathing  Goal: STG-Within one week, patient will bathe  Description: 1) Individualized Goal:  with supervision and remember to wash all body parts with only fading cues  2) Interventions:  OT Self Care/ADL, OT Cognitive Skill Dev, OT Neuro Re-Ed/Balance, OT Therapeutic Activity, OT Evaluation, and OT Therapeutic Exercise    Outcome: MET     Problem: Dressing  Goal: STG-Within one week, patient will dress LB  Description:  1) Individualized Goal:  with supervision   2) Interventions:  OT Self Care/ADL, OT Cognitive Skill Dev, OT Neuro Re-Ed/Balance, OT Therapeutic Activity, OT Evaluation, and OT Therapeutic Exercise    Outcome: MET     Problem: Toileting  Goal: STG-Within one week, patient will complete toileting tasks  Description:  1) Individualized Goal:  with distant supervision   2) Interventions:  OT Self Care/ADL, OT Cognitive Skill Dev, OT Neuro Re-Ed/Balance, OT Therapeutic Activity, OT Evaluation, and OT Therapeutic Exercise  Outcome: MET

## 2020-09-29 NOTE — THERAPY
"Occupational Therapy  Daily Treatment     Patient Name: Jordan Alexander  Age:  75 y.o., Sex:  male  Medical Record #: [id number]  Today's Date: 9/29/2020     Precautions  Precautions: Fall Risk  Comments: memory deficits         Subjective    \"Well that was kind of crumby.\" re: memory    \"I can picture it, but I can't think of what it is.\" pt stated when trying to complete memory log for breakfast.     Objective       09/29/20 0931   Cognition    Level of Consciousness Alert   Ability To Follow Commands 1 Step   Safety Awareness Impaired   New Learning Impaired   Attention Impaired   Sequencing Impaired   Functional Level of Assist   Eating Stand by Assist   Eating Description Set-up of equipment or meal/tube feeding;Verbal cueing   Grooming Stand by Assist   Grooming Description Standing at sink  (washing hands)   Upper Body Dressing Supervision   Upper Body Dressing Description Set-up of equipment   Bed, Chair, Wheelchair Transfer Contact Guard Assist   Bed Chair Wheelchair Transfer Description Increased time;Set-up of equipment;Supervision for safety;Verbal cueing   Sitting Upper Body Exercises   Chest Press 1 set of 10  (further trials limited due to irritation at IV site)   Front Arm Raise 3 sets of 10;Right ;Left  (one side at a time)   Internal Shoulder Rotation 1 set of 10;Right ;Left   External Shoulder Rotation 1 set of 10;Right ;Left   Bicep Curls 3 sets of 10   Comments 2 lbs weights   IADL Treatments   IADL Treatments Other   Comments Pt performed grocery shopping task. Pt given $15 budget. Pt needed budget explain as \"maximum\" which he was better able to understand. Pt with diffiuclty with mental math, rounding, and memory during this task. Pt did well with use of calculator and using fake money to pay. Pt walked 150 feet with grocery cart. Pt did request to sit down during grocery gathering portion due to low back pain.   Bed Mobility    Supine to Sit Stand by Assist   Sit to Supine Stand by Assist "   Interdisciplinary Plan of Care Collaboration   IDT Collaboration with  Physical Therapist   Patient Position at End of Therapy Seated;Self Releasing Lap Belt Applied;Other (Comments)  (with PT)   Collaboration Comments hand off of care   OT Total Time Spent   OT Individual Total Time Spent (Mins) 60   OT Charge Group   OT Self Care / ADL 1   OT Therapy Activity 2   OT Therapeutic Exercise  1     OTR prompting pt to complete memory log for breakfast. After session, OTR and pt discussed what he would write on his memory log for OT session. Pt has PT directly after so unsure if he remembered to write it down when he returned to his room.    Assessment    Pt's memory and endurance continue to be his biggest barriers to safe return home. Pt also c/o low back pain while standing. Pt complains less when ambulating.     Strengths: Adequate strength, Independent prior level of function, Pleasant and cooperative, Willingly participates in therapeutic activities  Barriers: Confused, Decreased endurance, Impaired activity tolerance, Impaired balance, Impaired carryover of learning, Impaired insight/denial of deficits, Impaired functional cognition    Plan     ADL  IADL , related mobility and cognition  strength/endurance building   Balance activity     Occupational Therapy Goals     Problem: Bathing     Dates: Start: 09/26/20       Goal: STG-Within one week, patient will bathe     Dates: Start: 09/26/20       Description: 1) Individualized Goal:  with supervision and remember to wash all body parts with only fading cues  2) Interventions:  OT Self Care/ADL, OT Cognitive Skill Dev, OT Neuro Re-Ed/Balance, OT Therapeutic Activity, OT Evaluation, and OT Therapeutic Exercise                  Problem: Dressing     Dates: Start: 09/26/20       Goal: STG-Within one week, patient will dress LB     Dates: Start: 09/26/20       Description:  1) Individualized Goal:  with supervision   2) Interventions:  OT Self Care/ADL, OT Cognitive  Skill Dev, OT Neuro Re-Ed/Balance, OT Therapeutic Activity, OT Evaluation, and OT Therapeutic Exercise                  Problem: OT Long Term Goals     Dates: Start: 09/26/20       Goal: LTG-By discharge, patient will complete basic self care tasks     Dates: Start: 09/26/20       Description:  1) Individualized Goal:  with mod I  2) Interventions:  OT Self Care/ADL, OT Cognitive Skill Dev, OT Neuro Re-Ed/Balance, OT Therapeutic Activity, OT Evaluation, and OT Therapeutic Exercise          Goal: LTG-By discharge, patient will perform bathroom transfers     Dates: Start: 09/26/20       Description:  1) Individualized Goal:  with mod I  2) Interventions:  OT Self Care/ADL, OT Cognitive Skill Dev, OT Neuro Re-Ed/Balance, OT Therapeutic Activity, OT Evaluation, and OT Therapeutic Exercise                Problem: Toileting     Dates: Start: 09/26/20       Goal: STG-Within one week, patient will complete toileting tasks     Dates: Start: 09/26/20       Description:  1) Individualized Goal:  with distant supervision   2) Interventions:  OT Self Care/ADL, OT Cognitive Skill Dev, OT Neuro Re-Ed/Balance, OT Therapeutic Activity, OT Evaluation, and OT Therapeutic Exercise

## 2020-09-30 LAB
ANION GAP SERPL CALC-SCNC: 13 MMOL/L (ref 7–16)
BASOPHILS # BLD AUTO: 0.8 % (ref 0–1.8)
BASOPHILS # BLD: 0.05 K/UL (ref 0–0.12)
BUN SERPL-MCNC: 27 MG/DL (ref 8–22)
CALCIUM SERPL-MCNC: 9.2 MG/DL (ref 8.5–10.5)
CHLORIDE SERPL-SCNC: 94 MMOL/L (ref 96–112)
CO2 SERPL-SCNC: 25 MMOL/L (ref 20–33)
CREAT SERPL-MCNC: 0.73 MG/DL (ref 0.5–1.4)
EOSINOPHIL # BLD AUTO: 0.12 K/UL (ref 0–0.51)
EOSINOPHIL NFR BLD: 1.9 % (ref 0–6.9)
ERYTHROCYTE [DISTWIDTH] IN BLOOD BY AUTOMATED COUNT: 48 FL (ref 35.9–50)
GLUCOSE SERPL-MCNC: 101 MG/DL (ref 65–99)
HCT VFR BLD AUTO: 32.3 % (ref 42–52)
HGB BLD-MCNC: 11.2 G/DL (ref 14–18)
IMM GRANULOCYTES # BLD AUTO: 0.02 K/UL (ref 0–0.11)
IMM GRANULOCYTES NFR BLD AUTO: 0.3 % (ref 0–0.9)
LYMPHOCYTES # BLD AUTO: 0.94 K/UL (ref 1–4.8)
LYMPHOCYTES NFR BLD: 14.9 % (ref 22–41)
MAGNESIUM SERPL-MCNC: 1.4 MG/DL (ref 1.5–2.5)
MCH RBC QN AUTO: 35.9 PG (ref 27–33)
MCHC RBC AUTO-ENTMCNC: 34.7 G/DL (ref 33.7–35.3)
MCV RBC AUTO: 103.5 FL (ref 81.4–97.8)
MONOCYTES # BLD AUTO: 0.73 K/UL (ref 0–0.85)
MONOCYTES NFR BLD AUTO: 11.6 % (ref 0–13.4)
NEUTROPHILS # BLD AUTO: 4.44 K/UL (ref 1.82–7.42)
NEUTROPHILS NFR BLD: 70.5 % (ref 44–72)
NRBC # BLD AUTO: 0 K/UL
NRBC BLD-RTO: 0 /100 WBC
PLATELET # BLD AUTO: 432 K/UL (ref 164–446)
PMV BLD AUTO: 10.9 FL (ref 9–12.9)
POTASSIUM SERPL-SCNC: 3.6 MMOL/L (ref 3.6–5.5)
RBC # BLD AUTO: 3.12 M/UL (ref 4.7–6.1)
SODIUM SERPL-SCNC: 132 MMOL/L (ref 135–145)
WBC # BLD AUTO: 6.3 K/UL (ref 4.8–10.8)

## 2020-09-30 PROCEDURE — 770010 HCHG ROOM/CARE - REHAB SEMI PRIVAT*

## 2020-09-30 PROCEDURE — A9270 NON-COVERED ITEM OR SERVICE: HCPCS | Performed by: PHYSICAL MEDICINE & REHABILITATION

## 2020-09-30 PROCEDURE — A9270 NON-COVERED ITEM OR SERVICE: HCPCS | Performed by: NURSE PRACTITIONER

## 2020-09-30 PROCEDURE — 700102 HCHG RX REV CODE 250 W/ 637 OVERRIDE(OP): Performed by: PHYSICAL MEDICINE & REHABILITATION

## 2020-09-30 PROCEDURE — 97530 THERAPEUTIC ACTIVITIES: CPT

## 2020-09-30 PROCEDURE — 97129 THER IVNTJ 1ST 15 MIN: CPT

## 2020-09-30 PROCEDURE — 700102 HCHG RX REV CODE 250 W/ 637 OVERRIDE(OP): Performed by: NURSE PRACTITIONER

## 2020-09-30 PROCEDURE — 97112 NEUROMUSCULAR REEDUCATION: CPT

## 2020-09-30 PROCEDURE — 97110 THERAPEUTIC EXERCISES: CPT

## 2020-09-30 PROCEDURE — 80048 BASIC METABOLIC PNL TOTAL CA: CPT

## 2020-09-30 PROCEDURE — 36415 COLL VENOUS BLD VENIPUNCTURE: CPT

## 2020-09-30 PROCEDURE — 83735 ASSAY OF MAGNESIUM: CPT

## 2020-09-30 PROCEDURE — 85025 COMPLETE CBC W/AUTO DIFF WBC: CPT

## 2020-09-30 PROCEDURE — 99232 SBSQ HOSP IP/OBS MODERATE 35: CPT | Performed by: PHYSICAL MEDICINE & REHABILITATION

## 2020-09-30 PROCEDURE — 97130 THER IVNTJ EA ADDL 15 MIN: CPT

## 2020-09-30 PROCEDURE — 97535 SELF CARE MNGMENT TRAINING: CPT

## 2020-09-30 RX ADMIN — MELATONIN 3 MG: at 20:42

## 2020-09-30 RX ADMIN — APIXABAN 2.5 MG: 5 TABLET, FILM COATED ORAL at 20:42

## 2020-09-30 RX ADMIN — TRAZODONE HYDROCHLORIDE 50 MG: 50 TABLET ORAL at 20:42

## 2020-09-30 RX ADMIN — SOTALOL HYDROCHLORIDE 80 MG: 80 TABLET ORAL at 05:35

## 2020-09-30 RX ADMIN — ACETAMINOPHEN 500 MG: 500 TABLET, FILM COATED ORAL at 14:46

## 2020-09-30 RX ADMIN — SIMVASTATIN 20 MG: 20 TABLET, FILM COATED ORAL at 20:42

## 2020-09-30 RX ADMIN — Medication 1 G: at 17:22

## 2020-09-30 RX ADMIN — APIXABAN 2.5 MG: 5 TABLET, FILM COATED ORAL at 08:30

## 2020-09-30 RX ADMIN — ACETAMINOPHEN 500 MG: 500 TABLET, FILM COATED ORAL at 20:42

## 2020-09-30 RX ADMIN — Medication 1 G: at 08:31

## 2020-09-30 RX ADMIN — THIAMINE HCL TAB 100 MG 100 MG: 100 TAB at 08:31

## 2020-09-30 RX ADMIN — SOTALOL HYDROCHLORIDE 80 MG: 80 TABLET ORAL at 17:22

## 2020-09-30 RX ADMIN — DOCUSATE SODIUM 50 MG AND SENNOSIDES 8.6 MG 2 TABLET: 8.6; 5 TABLET, FILM COATED ORAL at 08:30

## 2020-09-30 RX ADMIN — ACETAMINOPHEN 500 MG: 500 TABLET, FILM COATED ORAL at 08:31

## 2020-09-30 RX ADMIN — CHOLECALCIFEROL TAB 25 MCG (1000 UNIT) 4000 UNITS: 25 TAB at 08:30

## 2020-09-30 RX ADMIN — AMLODIPINE BESYLATE 10 MG: 5 TABLET ORAL at 20:42

## 2020-09-30 RX ADMIN — MONTELUKAST 10 MG: 10 TABLET, FILM COATED ORAL at 08:30

## 2020-09-30 RX ADMIN — ACETAMINOPHEN 500 MG: 500 TABLET, FILM COATED ORAL at 17:22

## 2020-09-30 RX ADMIN — Medication 1 G: at 11:06

## 2020-09-30 ASSESSMENT — ACTIVITIES OF DAILY LIVING (ADL)
TOILET_TRANSFER_DESCRIPTION: GRAB BAR;SUPERVISION FOR SAFETY;SET-UP OF EQUIPMENT
TOILET_TRANSFER_DESCRIPTION: GRAB BAR;SUPERVISION FOR SAFETY;SET-UP OF EQUIPMENT
BED_CHAIR_WHEELCHAIR_TRANSFER_DESCRIPTION: INCREASED TIME;SET-UP OF EQUIPMENT;SUPERVISION FOR SAFETY
TOILETING_LEVEL_OF_ASSIST_DESCRIPTION: GRAB BAR;INCREASED TIME;SUPERVISION FOR SAFETY

## 2020-09-30 ASSESSMENT — GAIT ASSESSMENTS
DEVIATION: BRADYKINETIC;DECREASED TOE OFF
DISTANCE (FEET): 250
GAIT LEVEL OF ASSIST: STAND BY ASSIST

## 2020-09-30 NOTE — THERAPY
Physical Therapy   Daily Treatment     Patient Name: Jordan Alexander  Age:  75 y.o., Sex:  male  Medical Record #: [id number]  Today's Date: 9/30/2020     Precautions  Precautions: Fall Risk  Comments: memory deficits    Subjective    Pt seated in room, agreeable to PT.      Objective       09/30/20 0901   Precautions   Precautions Fall Risk   Comments memory deficits   Vitals   Patient BP Position Standing 1 minute   Blood Pressure  (!) 88/53  (110/58 in sitting)   Gait Functional Level of Assist    Gait Level Of Assist Stand by Assist   Assistive Device None   Distance (Feet) 250   # of Times Distance was Traveled 1   Deviation Bradykinetic;Decreased Toe Off   Transfer Functional Level of Assist   Bed, Chair, Wheelchair Transfer Supervised   Bed Chair Wheelchair Transfer Description Increased time;Set-up of equipment;Supervision for safety  (WC > bed, SPT )   Toilet Transfers Supervised   Toilet Transfer Description Grab bar;Supervision for safety;Set-up of equipment   Supine Lower Body Exercise   Bridges 1 set of 15   Hip Abduction 1 set of 15   Hip Adduction  1 set of 15   Straight Leg Raises 1 set of 15   Short Arc Quad 1 set of 15   Heel Slide 1 set of 15   Ankle Pumps 1 set of 15   Comments Above exercises completed on treatment mat with wedge under back for comfort    Bed Mobility    Sit to Stand Supervised   Interdisciplinary Plan of Care Collaboration   IDT Collaboration with  Family / Caregiver   Patient Position at End of Therapy In Bed;Call Light within Reach;Tray Table within Reach;Family / Friend in Room   Collaboration Comments pt's wife arrived at end of PT session, PT introduced self and discussed CLOF   PT Total Time Spent   PT Individual Total Time Spent (Mins) 60     Pt attempted standing in // bars to complete balance activity, however with 3 attempts became dizzy and sat down again for safety. Pt education provided regarding orthostatic hypotension and adequate fluid intake to avoid dehydration;  also discussed current fluid restriction and what he can intake.     Assessment    Pt with symptomatic orthostatic hypotension limiting ability to complete standing activities this session. Also limited by low back pain with ambulation.    Strengths: Able to follow instructions, Adequate strength, Good insight into deficits/needs, Independent prior level of function, Motivated for self care and independence, Pleasant and cooperative, Supportive family, Willingly participates in therapeutic activities  Barriers: Decreased endurance, Impaired activity tolerance, Impaired balance(lightheadedness)    Plan    LE strength and endurance, balance activities as able, ambulation without AD, assess curbs/ stairs, monitor for orthostatic hypotension.        Physical Therapy Problems     Problem: Balance     Dates: Start: 09/26/20       Goal: STG-Within one week, patient will     Dates: Start: 09/26/20       Description: 1) Individualized goal:  tolerate Jimenez Balance Scale  2) Interventions:  PT Group Therapy, PT Gait Training, PT Therapeutic Exercises, PT Neuro Re-Ed/Balance, PT Therapeutic Activity, and PT Evaluation      Note:     Goal Note filed on 09/29/20 1107 by Jocelyn Fernandez, PT    To be completed this afternoon                        Problem: Mobility     Dates: Start: 09/26/20       Goal: STG-Within one week, patient will ambulate community distances     Dates: Start: 09/26/20       Description: 1) Individualized goal: 150 ft with no AD and supervision.  2) Interventions:  PT Group Therapy, PT Gait Training, PT Therapeutic Exercises, PT Neuro Re-Ed/Balance, PT Therapeutic Activity, and PT Evaluation    Note:     Goal Note filed on 09/29/20 1107 by Jocelyn Fernandez, PT    CGA no AD                  Goal: STG-Within one week, patient will ambulate up/down a curb     Dates: Start: 09/26/20       Description: 1) Individualized goal: with BUE support and CGA.  2) Interventions:  PT Group Therapy, PT Gait Training, PT Therapeutic  Exercises, PT Neuro Re-Ed/Balance, PT Therapeutic Activity, and PT Evaluation    Note:     Goal Note filed on 09/29/20 1107 by Jocelyn Fernandez, PT    To be assessed                         Problem: Mobility Transfers     Dates: Start: 09/26/20       Goal: STG-Within one week, patient will perform bed mobility     Dates: Start: 09/26/20       Description: 1) Individualized goal: independently.  2) Interventions:  PT Group Therapy, PT Gait Training, PT Therapeutic Exercises, PT Neuro Re-Ed/Balance, PT Therapeutic Activity, and PT Evaluation    Note:     Goal Note filed on 09/29/20 1107 by Jocelyn Fernandez, PT    To be assessed                   Goal: STG-Within one week, patient will transfer bed to chair     Dates: Start: 09/26/20       Description: 1) Individualized goal: with no AD and supervision.  2) Interventions:  PT Group Therapy, PT Gait Training, PT Therapeutic Exercises, PT Neuro Re-Ed/Balance, PT Therapeutic Activity, and PT Evaluation    Note:     Goal Note filed on 09/29/20 1107 by Jocelyn Fernandez, PT    To be assessed                         Problem: PT-Long Term Goals     Dates: Start: 09/26/20       Goal: LTG-By discharge, patient will ambulate     Dates: Start: 09/26/20       Description: 1) Individualized goal: 200 ft with no AD mod I.  2) Interventions:  PT Group Therapy, PT Gait Training, PT Therapeutic Exercises, PT Neuro Re-Ed/Balance, PT Therapeutic Activity, and PT Evaluation          Goal: LTG-By discharge, patient will transfer one surface to another     Dates: Start: 09/26/20       Description: 1) Individualized goal: with no AD mod I.  2) Interventions:  PT Group Therapy, PT Gait Training, PT Therapeutic Exercises, PT Neuro Re-Ed/Balance, PT Therapeutic Activity, and PT Evaluation          Goal: LTG-By discharge, patient will perform home exercise program     Dates: Start: 09/26/20       Description: 1) Individualized goal: with handout mod I.  2) Interventions:  PT Group Therapy, PT Gait Training, PT  Therapeutic Exercises, PT Neuro Re-Ed/Balance, PT Therapeutic Activity, and PT Evaluation          Goal: LTG-By discharge, patient will transfer in/out of a car     Dates: Start: 09/26/20       Description: 1) Individualized goal: with no AD and supervision.  2) Interventions:  PT Group Therapy, PT Gait Training, PT Therapeutic Exercises, PT Neuro Re-Ed/Balance, PT Therapeutic Activity, and PT Evaluation          Goal: LTG-By discharge, patient will     Dates: Start: 09/26/20       Description: 1) Individualized goal: improve Jimenez Balance Scale by >= 5 points to achieve MDC.  2) Interventions:  PT Group Therapy, PT Gait Training, PT Therapeutic Exercises, PT Neuro Re-Ed/Balance, PT Therapeutic Activity, and PT Evaluation          Goal: LTG-By discharge, patient will     Dates: Start: 09/26/20       Description: 1) Individualized goal: negotiate 1-2 steps with BUE support and supervision.  2) Interventions:  PT Group Therapy, PT Gait Training, PT Therapeutic Exercises, PT Neuro Re-Ed/Balance, PT Therapeutic Activity, and PT Evaluation

## 2020-09-30 NOTE — THERAPY
"Speech Language Pathology  Daily Treatment     Patient Name: Jordan Alexandre  Age:  75 y.o., Sex:  male  Medical Record #: [id number]  Today's Date: 9/30/2020     Precautions  Precautions: Fall Risk  Comments: memory deficits, orhtostatic hypotension    Subjective    \"tricky\" - related to attention to detail of medication instructions     Objective       09/30/20 1303   SLP Total Time Spent   SLP Individual Total Time Spent (Mins) 30   Treatment Charges   SLP Cognitive Skill Development First 15 Minutes 1   SLP Cognitive Skill Development Additional 15 Minutes 1       Assessment    Pt presented with medication task in which pt was to identify any errors within the given sort. Pt noted to often misread the written instruction resulting in incorrect determination of whether sort was correct or incorrect. Pt completed with 72% accuracy overall, increased to 100% provided MIN cues from SLP.    Strengths: Able to follow instructions, Independent prior level of function, Pleasant and cooperative, Motivated for self care and independence, Willingly participates in therapeutic activities  Barriers: Aspiration risk, Impaired functional cognition    Plan    Pt would benefit from functional medication sort with current meds.    Speech Therapy Problems     Problem: Memory STGs     Dates: Start: 09/26/20       Goal: STG-Within one week, patient will     Dates: Start: 09/26/20       Description: 1) Individualized goal:  Recall daily events, and safety strategies with use of external aid, 80% Jo-Ann.   2) Interventions:  SLP Cognitive Skill Development        Note:     Goal Note filed on 09/28/20 9058 by Aydin Foster MS,CCC-SLP    Continue to target                         Problem: Problem Solving STGs     Dates: Start: 09/26/20       Goal: STG-Within one week, patient will     Dates: Start: 09/27/20       Description: 1) Individualized goal:  Complete executive function tasks related to medication management/finance, with 80% accuracy, " given min verbal cues.    2) Interventions:  SLP Cognitive Skill Development and SLP Group Treatment        Note:     Goal Note filed on 09/28/20 1537 by Aydin Foster MS,CCC-SLP    Continue to target                         Problem: Speech/Swallowing LTGs     Dates: Start: 09/26/20       Goal: LTG-By discharge, patient will safely swallow     Dates: Start: 09/26/20       Description: 1) Individualized goal:  regular textures, and thin liquids, without aspiration.   2) Interventions:  SLP Swallowing Dysfunction Treatment, SLP Oral Pharyngeal Evaluation, SLP Video Swallow Evaluation, and SLP Group Treatment              Goal: LTG-By discharge, patient will solve complex problem     Dates: Start: 09/26/20       Description: 1) Individualized goal:  Marco for safe discharge home.   2) Interventions:  SLP Aphasia Evaluation, SLP Cognitive Skill Development, and SLP Group Treatment

## 2020-09-30 NOTE — THERAPY
Occupational Therapy  Daily Treatment     Patient Name: Jordan Alexander  Age:  75 y.o., Sex:  male  Medical Record #: [id number]  Today's Date: 9/30/2020     Precautions  Precautions: Fall Risk  Comments: memory deficits, orhtostatic hypotension    Subjective    Patient reported intermittent dizziness during therapeutic standing task. See vital readings below.      Objective     09/30/20 1331   Precautions   Precautions Fall Risk   Vitals   Pulse 69   Patient BP Position Sitting   Blood Pressure  (!) 99/49   O2 Delivery Device None - Room Air   Cognition    Level of Consciousness Alert   Sleep/Wake Cycle   Sleep & Rest Awake   Functional Level of Assist   Grooming Supervision  (for washing hands while seated in w/c)   Toileting Supervision   Toileting Description Grab bar;Increased time;Supervision for safety   Toilet Transfers Supervised   Toilet Transfer Description Grab bar;Supervision for safety;Set-up of equipment   Interdisciplinary Plan of Care Collaboration   Patient Position at End of Therapy Seated;Self Releasing Lap Belt Applied;Call Light within Reach   OT Total Time Spent   OT Individual Total Time Spent (Mins) 60   OT Charge Group   OT Self Care / ADL 1   OT Neuromuscular Re-education / Balance 2   OT Therapy Activity 1     Patient actively participated in [employer] game while standing with rest breaks throughout session (due to dizziness and/or fatigue).  - Initial /51  - 99/49 after standing x 2 mins   - BP improved to 108/55 with ~3 min rest break  - After standing x 0:40, patient reported dizziness and returned to seated position, 106/55  - 112//60 after standing x 2:15 (returned to seated position due to fatigue)  - 103/51 after standing x 4:45    No additional dizziness episodes noted.     Assessment    Improved standing tolerance noted within session and no additional c/o dizziness following 3-4 seated rest breaks. Limited this session primarily by decreased endurance/fatigue and unstable BP  (orthostatic).   Strengths: Adequate strength, Independent prior level of function, Pleasant and cooperative, Willingly participates in therapeutic activities  Barriers: Confused, Decreased endurance, Impaired activity tolerance, Impaired balance, Impaired carryover of learning, Impaired insight/denial of deficits, Impaired functional cognition    Plan     ADLs/IADLs , related mobility and cognition  strength/endurance building ,balance activity     Occupational Therapy Goals     Problem: Dressing     Dates: Start: 09/29/20       Goal: STG-Within one week, patient will dress UB     Dates: Start: 09/29/20       Description: 1) Individualized Goal: with distant supervision   2) Interventions: OT Self Care/ADL, OT Cognitive Skill Dev, OT Neuro Re-Ed/Balance, OT Therapeutic Activity, OT Evaluation, and OT Therapeutic Exercise            Goal: STG-Within one week, patient will dress LB     Dates: Start: 09/29/20       Description: 1) Individualized Goal: with distant supervision   2) Interventions: OT Self Care/ADL, OT Cognitive Skill Dev, OT Neuro Re-Ed/Balance, OT Therapeutic Activity, OT Evaluation, and OT Therapeutic Exercise                Problem: OT Long Term Goals     Dates: Start: 09/26/20       Goal: LTG-By discharge, patient will complete basic self care tasks     Dates: Start: 09/26/20       Description:  1) Individualized Goal:  with mod I  2) Interventions:  OT Self Care/ADL, OT Cognitive Skill Dev, OT Neuro Re-Ed/Balance, OT Therapeutic Activity, OT Evaluation, and OT Therapeutic Exercise          Goal: LTG-By discharge, patient will perform bathroom transfers     Dates: Start: 09/26/20       Description:  1) Individualized Goal:  with mod I  2) Interventions:  OT Self Care/ADL, OT Cognitive Skill Dev, OT Neuro Re-Ed/Balance, OT Therapeutic Activity, OT Evaluation, and OT Therapeutic Exercise

## 2020-09-30 NOTE — PROGRESS NOTES
Patient care assumed. Report received from Umesh Sagastume. Patient is alert and calm, resting in bed. Call light and bedside table within reach. Will continue to monitor.

## 2020-09-30 NOTE — THERAPY
"Speech Language Pathology  Daily Treatment     Patient Name: Jordan Alexander  Age:  75 y.o., Sex:  male  Medical Record #: [id number]  Today's Date: 9/30/2020     Precautions  Precautions: Fall Risk  Comments: memory deficits, orhtostatic hypotension    Subjective    Pt was pleasant and cooperative during this ST session      Objective       09/30/20 1431   SLP Total Time Spent   SLP Individual Total Time Spent (Mins) 30   Treatment Charges   SLP Cognitive Skill Development First 15 Minutes 1   SLP Cognitive Skill Development Additional 15 Minutes 1       Assessment    Pt completed a medication sorting task.  Pt initially required min cues to problem solve where to sort medications and to get familiar with the pill box he was using.  Pt was able to sort 3/7 medications independently without errors.  Medication doubles or incorrect sorting errors were noted on the other 4 medications and pt required mod cues to locate and correct these errors.  Pt stated \"I must be getting tired, I usually do this at home without any trouble\".      Strengths: Able to follow instructions, Independent prior level of function, Pleasant and cooperative, Motivated for self care and independence, Willingly participates in therapeutic activities  Barriers: Aspiration risk, Impaired functional cognition    Plan    Continue targeting medication sorting     Speech Therapy Problems     Problem: Memory STGs     Dates: Start: 09/26/20       Goal: STG-Within one week, patient will     Dates: Start: 09/26/20       Description: 1) Individualized goal:  Recall daily events, and safety strategies with use of external aid, 80% Jo-Ann.   2) Interventions:  SLP Cognitive Skill Development        Note:     Goal Note filed on 09/28/20 1537 by Aydin Foster MS,CCC-SLP    Continue to target                         Problem: Problem Solving STGs     Dates: Start: 09/26/20       Goal: STG-Within one week, patient will     Dates: Start: 09/27/20       Description: 1) " Individualized goal:  Complete executive function tasks related to medication management/finance, with 80% accuracy, given min verbal cues.    2) Interventions:  SLP Cognitive Skill Development and SLP Group Treatment        Note:     Goal Note filed on 09/28/20 1537 by Aydin Foster MS,CCC-SLP    Continue to target                         Problem: Speech/Swallowing LTGs     Dates: Start: 09/26/20       Goal: LTG-By discharge, patient will safely swallow     Dates: Start: 09/26/20       Description: 1) Individualized goal:  regular textures, and thin liquids, without aspiration.   2) Interventions:  SLP Swallowing Dysfunction Treatment, SLP Oral Pharyngeal Evaluation, SLP Video Swallow Evaluation, and SLP Group Treatment              Goal: LTG-By discharge, patient will solve complex problem     Dates: Start: 09/26/20       Description: 1) Individualized goal:  Marco for safe discharge home.   2) Interventions:  SLP Aphasia Evaluation, SLP Cognitive Skill Development, and SLP Group Treatment

## 2020-09-30 NOTE — CARE PLAN
Problem: Communication  Goal: The ability to communicate needs accurately and effectively will improve  Outcome: PROGRESSING AS EXPECTED  Note: Pt is able to communicate his needs effectively to staff.      Problem: Safety  Goal: Will remain free from injury  Outcome: PROGRESSING AS EXPECTED  Note: Pt uses call light consistently for staff assistance. Pt has good safety awareness, no impulsivity observed.      Problem: Infection  Goal: Will remain free from infection  Outcome: PROGRESSING AS EXPECTED  Note: No s/s of infection present      Problem: Bowel/Gastric:  Goal: Normal bowel function is maintained or improved  Outcome: PROGRESSING AS EXPECTED  Note: Pt had a BM this evening and was continent

## 2020-10-01 PROCEDURE — 97110 THERAPEUTIC EXERCISES: CPT

## 2020-10-01 PROCEDURE — 700102 HCHG RX REV CODE 250 W/ 637 OVERRIDE(OP): Performed by: PHYSICAL MEDICINE & REHABILITATION

## 2020-10-01 PROCEDURE — 99232 SBSQ HOSP IP/OBS MODERATE 35: CPT | Performed by: PHYSICAL MEDICINE & REHABILITATION

## 2020-10-01 PROCEDURE — 97129 THER IVNTJ 1ST 15 MIN: CPT

## 2020-10-01 PROCEDURE — 97530 THERAPEUTIC ACTIVITIES: CPT

## 2020-10-01 PROCEDURE — A9270 NON-COVERED ITEM OR SERVICE: HCPCS | Performed by: PHYSICAL MEDICINE & REHABILITATION

## 2020-10-01 PROCEDURE — 97535 SELF CARE MNGMENT TRAINING: CPT

## 2020-10-01 PROCEDURE — 97112 NEUROMUSCULAR REEDUCATION: CPT

## 2020-10-01 PROCEDURE — 770010 HCHG ROOM/CARE - REHAB SEMI PRIVAT*

## 2020-10-01 PROCEDURE — 97116 GAIT TRAINING THERAPY: CPT

## 2020-10-01 PROCEDURE — 97130 THER IVNTJ EA ADDL 15 MIN: CPT

## 2020-10-01 PROCEDURE — 700102 HCHG RX REV CODE 250 W/ 637 OVERRIDE(OP): Performed by: NURSE PRACTITIONER

## 2020-10-01 PROCEDURE — A9270 NON-COVERED ITEM OR SERVICE: HCPCS | Performed by: NURSE PRACTITIONER

## 2020-10-01 RX ORDER — AMLODIPINE BESYLATE 5 MG/1
5 TABLET ORAL
Status: DISCONTINUED | OUTPATIENT
Start: 2020-10-01 | End: 2020-10-05

## 2020-10-01 RX ADMIN — THIAMINE HCL TAB 100 MG 100 MG: 100 TAB at 08:38

## 2020-10-01 RX ADMIN — AMLODIPINE BESYLATE 5 MG: 5 TABLET ORAL at 20:57

## 2020-10-01 RX ADMIN — Medication 1 G: at 17:26

## 2020-10-01 RX ADMIN — Medication 1 G: at 08:37

## 2020-10-01 RX ADMIN — ACETAMINOPHEN 500 MG: 500 TABLET, FILM COATED ORAL at 17:26

## 2020-10-01 RX ADMIN — POLYVINYL ALCOHOL 1 DROP: 14 SOLUTION/ DROPS OPHTHALMIC at 20:56

## 2020-10-01 RX ADMIN — SOTALOL HYDROCHLORIDE 80 MG: 80 TABLET ORAL at 17:26

## 2020-10-01 RX ADMIN — APIXABAN 2.5 MG: 5 TABLET, FILM COATED ORAL at 20:56

## 2020-10-01 RX ADMIN — SIMVASTATIN 20 MG: 20 TABLET, FILM COATED ORAL at 20:56

## 2020-10-01 RX ADMIN — APIXABAN 2.5 MG: 5 TABLET, FILM COATED ORAL at 08:38

## 2020-10-01 RX ADMIN — ACETAMINOPHEN 500 MG: 500 TABLET, FILM COATED ORAL at 20:56

## 2020-10-01 RX ADMIN — MONTELUKAST 10 MG: 10 TABLET, FILM COATED ORAL at 08:39

## 2020-10-01 RX ADMIN — CHOLECALCIFEROL TAB 25 MCG (1000 UNIT) 4000 UNITS: 25 TAB at 08:38

## 2020-10-01 RX ADMIN — TRAZODONE HYDROCHLORIDE 50 MG: 50 TABLET ORAL at 20:57

## 2020-10-01 RX ADMIN — FERROUS SULFATE TAB 325 MG (65 MG ELEMENTAL FE) 325 MG: 325 (65 FE) TAB at 08:39

## 2020-10-01 RX ADMIN — ACETAMINOPHEN 500 MG: 500 TABLET, FILM COATED ORAL at 08:39

## 2020-10-01 RX ADMIN — SOTALOL HYDROCHLORIDE 80 MG: 80 TABLET ORAL at 05:57

## 2020-10-01 RX ADMIN — Medication 1 G: at 11:39

## 2020-10-01 RX ADMIN — MELATONIN 3 MG: at 20:57

## 2020-10-01 RX ADMIN — ACETAMINOPHEN 500 MG: 500 TABLET, FILM COATED ORAL at 13:42

## 2020-10-01 ASSESSMENT — GAIT ASSESSMENTS
DEVIATION: TRENDELENBERG;BRADYKINETIC;DECREASED TOE OFF
DISTANCE (FEET): 345
GAIT LEVEL OF ASSIST: CONTACT GUARD ASSIST
ASSISTIVE DEVICE: OTHER (COMMENTS)

## 2020-10-01 ASSESSMENT — PAIN DESCRIPTION - PAIN TYPE: TYPE: ACUTE PAIN

## 2020-10-01 NOTE — THERAPY
"Occupational Therapy  Daily Treatment     Patient Name: Jordan Alexander  Age:  75 y.o., Sex:  male  Medical Record #: [id number]  Today's Date: 10/1/2020     Precautions  Precautions: (P) Fall Risk  Comments: (P) memory deficits, orhtostatic hypotension    Subjective    \"I agree I need to have someone with me when I'm standing. I'm just worried about falling over and dying. Who knows with this heart.\"      Objective       10/01/20 0901   Precautions   Precautions Fall Risk   Comments memory deficits, orhtostatic hypotension   Vitals   Patient BP Position Sitting   Blood Pressure  102/55   Vitals Comments See below for more vitals readings   Cognition    Level of Consciousness Alert   Ability To Follow Commands 1 Step   Safety Awareness Impaired   New Learning Impaired   Attention Impaired   Sequencing Impaired   Comments Did better with remembering current events such as football games and what he ate for breakfast.   Sleep/Wake Cycle   Sleep & Rest Awake;Out of bed   Functional Level of Assist   Grooming Modified Independent;Seated   Grooming Description Increased time;Seated in wheelchair at sink  (oral hygiene)   Sitting Upper Body Exercises   Upper Extremity Bike Level 3 Resistance  (motomed; 10 minutes; 1.1 miles)   Interdisciplinary Plan of Care Collaboration   IDT Collaboration with  Nursing;Physician   Patient Position at End of Therapy Seated;Self Releasing Lap Belt Applied;Call Light within Reach;Tray Table within Reach;Phone within Reach   Collaboration Comments re: low BP   OT Total Time Spent   OT Individual Total Time Spent (Mins) 60   OT Charge Group   OT Self Care / ADL 1   OT Therapy Activity 1   OT Therapeutic Exercise  2     BP Readings:  Start of session 102/55  Standing 88/44  Sitting 116/52  Rest break during UE Bike 97/48  End of session 91/51    Educated pt on passport. Reviewed each item on OT list and CLOF for each item.    Assessment    Pt with calm affect. Pt motivated to go home, however, " continues to be limited by low bp when standing (orthostatic). Discussed how he will need supervision upon d/c if bp doesn't improve. Educated pt on importance of drinking more fluids. Provided fresh pitcher of water and poured pt 1 cup.    Strengths: Adequate strength, Independent prior level of function, Pleasant and cooperative, Willingly participates in therapeutic activities  Barriers: Confused, Decreased endurance, Impaired activity tolerance, Impaired balance, Impaired carryover of learning, Impaired insight/denial of deficits, Impaired functional cognition    Plan     monitor BP, ADLs/IADLs , related mobility and cognition  strength/endurance building ,balance activity     Occupational Therapy Goals     Problem: Dressing     Dates: Start: 09/29/20       Goal: STG-Within one week, patient will dress UB     Dates: Start: 09/29/20       Description: 1) Individualized Goal: with distant supervision   2) Interventions: OT Self Care/ADL, OT Cognitive Skill Dev, OT Neuro Re-Ed/Balance, OT Therapeutic Activity, OT Evaluation, and OT Therapeutic Exercise            Goal: STG-Within one week, patient will dress LB     Dates: Start: 09/29/20       Description: 1) Individualized Goal: with distant supervision   2) Interventions: OT Self Care/ADL, OT Cognitive Skill Dev, OT Neuro Re-Ed/Balance, OT Therapeutic Activity, OT Evaluation, and OT Therapeutic Exercise                Problem: OT Long Term Goals     Dates: Start: 09/26/20       Goal: LTG-By discharge, patient will complete basic self care tasks     Dates: Start: 09/26/20       Description:  1) Individualized Goal:  with mod I  2) Interventions:  OT Self Care/ADL, OT Cognitive Skill Dev, OT Neuro Re-Ed/Balance, OT Therapeutic Activity, OT Evaluation, and OT Therapeutic Exercise          Goal: LTG-By discharge, patient will perform bathroom transfers     Dates: Start: 09/26/20       Description:  1) Individualized Goal:  with mod I  2) Interventions:  OT Self  Care/ADL, OT Cognitive Skill Dev, OT Neuro Re-Ed/Balance, OT Therapeutic Activity, OT Evaluation, and OT Therapeutic Exercise

## 2020-10-01 NOTE — THERAPY
Speech Language Pathology  Daily Treatment     Patient Name: Jordan Alexander  Age:  75 y.o., Sex:  male  Medical Record #: [id number]  Today's Date: 10/1/2020     Precautions  Precautions: Fall Risk  Comments: memory deficits, orhtostatic hypotension    Subjective    Pt was pleasant and cooperative during this ST session      Objective       10/01/20 1401   SLP Total Time Spent   SLP Individual Total Time Spent (Mins) 60   Treatment Charges   SLP Cognitive Skill Development First 15 Minutes 1   SLP Cognitive Skill Development Additional 15 Minutes 3       Assessment    Pt continued a medication sorting task from yesterdays session with 2 more errors noted that pt required min cues to correct (cues to problem solve where to sort 2 and 3 times daily medications).  Pt completed a second medication sorting task independently to achieve 100% accuracy.      Strengths: Able to follow instructions, Independent prior level of function, Pleasant and cooperative, Motivated for self care and independence, Willingly participates in therapeutic activities  Barriers: Aspiration risk, Impaired functional cognition    Plan    Target memory, executive functions     Speech Therapy Problems     Problem: Memory STGs     Dates: Start: 09/26/20       Goal: STG-Within one week, patient will     Dates: Start: 09/26/20       Description: 1) Individualized goal:  Recall daily events, and safety strategies with use of external aid, 80% Jo-Ann.   2) Interventions:  SLP Cognitive Skill Development        Note:     Goal Note filed on 09/28/20 9307 by Aydin Foster MS,CCC-SLP    Continue to target                         Problem: Problem Solving STGs     Dates: Start: 09/26/20       Goal: STG-Within one week, patient will     Dates: Start: 09/27/20       Description: 1) Individualized goal:  Complete executive function tasks related to medication management/finance, with 80% accuracy, given min verbal cues.    2) Interventions:  SLP Cognitive Skill  Development and SLP Group Treatment        Note:     Goal Note filed on 09/28/20 1537 by Aydin Foster MS,CCC-SLP    Continue to target                         Problem: Speech/Swallowing LTGs     Dates: Start: 09/26/20       Goal: LTG-By discharge, patient will safely swallow     Dates: Start: 09/26/20       Description: 1) Individualized goal:  regular textures, and thin liquids, without aspiration.   2) Interventions:  SLP Swallowing Dysfunction Treatment, SLP Oral Pharyngeal Evaluation, SLP Video Swallow Evaluation, and SLP Group Treatment              Goal: LTG-By discharge, patient will solve complex problem     Dates: Start: 09/26/20       Description: 1) Individualized goal:  Marco for safe discharge home.   2) Interventions:  SLP Aphasia Evaluation, SLP Cognitive Skill Development, and SLP Group Treatment

## 2020-10-01 NOTE — PROGRESS NOTES
"Rehab Progress Note     Encounter Date: 10/1/2020    CC: encephalopathy, decreased mobility    Interval Events (Subjective)  Patient sitting up in therapy gym. He reports he feels it when his BP is low and it dropped into 80s with therapy. Sitting back down he feels fine. Discussed that his sodium was stable so could increase his fluid intake. Discussed limited of about 1 and half bedside pitchers per day. Discussed would decrease night time Amlodipine. Denies NVD.     IDT Team Meeting 9/29/2020  DC/Disposition:  10/6/20    Objective:  VITAL SIGNS: /52   Pulse 71   Temp 36.4 °C (97.6 °F) (Oral)   Resp 16   Ht 1.702 m (5' 7\")   Wt 70.5 kg (155 lb 6.8 oz)   SpO2 95%   BMI 24.34 kg/m²   Gen: NAD  Psych: Mood and affect appropriate  CV: RRR, no edema  Resp: CTAB, no upper airway sounds  Abd: NTND  Neuro: AOx4, 5/5 BUE  Unchanged from 9/30/20    Recent Results (from the past 72 hour(s))   CBC WITH DIFFERENTIAL    Collection Time: 09/30/20  6:58 AM   Result Value Ref Range    WBC 6.3 4.8 - 10.8 K/uL    RBC 3.12 (L) 4.70 - 6.10 M/uL    Hemoglobin 11.2 (L) 14.0 - 18.0 g/dL    Hematocrit 32.3 (L) 42.0 - 52.0 %    .5 (H) 81.4 - 97.8 fL    MCH 35.9 (H) 27.0 - 33.0 pg    MCHC 34.7 33.7 - 35.3 g/dL    RDW 48.0 35.9 - 50.0 fL    Platelet Count 432 164 - 446 K/uL    MPV 10.9 9.0 - 12.9 fL    Neutrophils-Polys 70.50 44.00 - 72.00 %    Lymphocytes 14.90 (L) 22.00 - 41.00 %    Monocytes 11.60 0.00 - 13.40 %    Eosinophils 1.90 0.00 - 6.90 %    Basophils 0.80 0.00 - 1.80 %    Immature Granulocytes 0.30 0.00 - 0.90 %    Nucleated RBC 0.00 /100 WBC    Neutrophils (Absolute) 4.44 1.82 - 7.42 K/uL    Lymphs (Absolute) 0.94 (L) 1.00 - 4.80 K/uL    Monos (Absolute) 0.73 0.00 - 0.85 K/uL    Eos (Absolute) 0.12 0.00 - 0.51 K/uL    Baso (Absolute) 0.05 0.00 - 0.12 K/uL    Immature Granulocytes (abs) 0.02 0.00 - 0.11 K/uL    NRBC (Absolute) 0.00 K/uL   Basic Metabolic Panel    Collection Time: 09/30/20  6:58 AM   Result " Value Ref Range    Sodium 132 (L) 135 - 145 mmol/L    Potassium 3.6 3.6 - 5.5 mmol/L    Chloride 94 (L) 96 - 112 mmol/L    Co2 25 20 - 33 mmol/L    Glucose 101 (H) 65 - 99 mg/dL    Bun 27 (H) 8 - 22 mg/dL    Creatinine 0.73 0.50 - 1.40 mg/dL    Calcium 9.2 8.5 - 10.5 mg/dL    Anion Gap 13.0 7.0 - 16.0   MAGNESIUM    Collection Time: 09/30/20  6:58 AM   Result Value Ref Range    Magnesium 1.4 (L) 1.5 - 2.5 mg/dL   ESTIMATED GFR    Collection Time: 09/30/20  6:58 AM   Result Value Ref Range    GFR If African American >60 >60 mL/min/1.73 m 2    GFR If Non African American >60 >60 mL/min/1.73 m 2       Current Facility-Administered Medications   Medication Frequency   • melatonin tablet 3 mg QHS   • traZODone (DESYREL) tablet 50 mg QHS PRN   • artificial tears ophthalmic solution 1 Drop Q2HRS PRN   • vitamin D (cholecalciferol) tablet 4,000 Units DAILY   • senna-docusate (PERICOLACE or SENOKOT S) 8.6-50 MG per tablet 2 Tab BID    And   • polyethylene glycol/lytes (MIRALAX) PACKET 1 Packet QDAY PRN    And   • magnesium hydroxide (MILK OF MAGNESIA) suspension 30 mL QDAY PRN    And   • bisacodyl (DULCOLAX) suppository 10 mg QDAY PRN   • oxyCODONE immediate-release (ROXICODONE) tablet 2.5 mg Q3HRS PRN   • Respiratory Therapy Consult Continuous RT   • acetaminophen (TYLENOL) tablet 500 mg 4X/DAY   • amLODIPine (NORVASC) tablet 10 mg QHS   • apixaban (ELIQUIS) tablet 2.5 mg BID   • budesonide-formoterol (SYMBICORT) 160-4.5 MCG/ACT inhaler 2 Puff BID PRN   • ferrous sulfate tablet 325 mg Q48HRS   • lidocaine (LIDODERM) 5 % 1 Patch QDAY PRN   • montelukast (SINGULAIR) tablet 10 mg DAILY   • simvastatin (ZOCOR) tablet 20 mg Q EVENING   • sodium chloride (SALT) tablet 1 g TID WITH MEALS   • sotalol (BETAPACE) tablet 80 mg TWICE DAILY   • thiamine tablet 100 mg DAILY       Orders Placed This Encounter   Procedures   • Diet Order Regular (whole meds )     Standing Status:   Standing     Number of Occurrences:   1     Order Specific  Question:   Diet:     Answer:   Regular [1]     Comments:   whole meds      Order Specific Question:   Nutrient modifications:     Answer:   High Protein [4]     Order Specific Question:   Electrolyte modifications:     Answer:   High Potassium [4]     Order Specific Question:   Texture Modifier     Answer:   Level 7 - Regular/Easy to Chew     Order Specific Question:   Liquid level     Answer:   Level 0 - Thin     Order Specific Question:   Consistency/Fluid modifications:     Answer:   1500 ml Fluid Restriction [9]       Assessment:  Active Hospital Problems    Diagnosis   • *Acute encephalopathy   • Hypokalemia   • Back pain   • Hypomagnesemia   • Paroxysmal atrial fibrillation (HCC)       Medical Decision Making and Plan:  Acute encephalopathy  - Etiology hypertensive vs metabolic   - No stroke found   - EEG with encephalopathy, no seizure captured.   - Admit to [facility]   - Evals by PT/OT and SLP on arrival depending on schedule      Dypshagia  SLP to consult.  [facility] [place] - advanced to regular    Pain  - Tylenol , lidoderm patch     PAF   - on eliquis, sotalol, amlodipine, apixaban, hydralazine, simvastatin     Pulm  - montelukast      Hyponatremia/SIADH  - Na 127 on day of admission   - 1L fluid restriction -> 1.5 L as becoming volume depleted  - Salt tabs 1g TID, continue  - Appreciate Nephrology recommendations. Signed off, still on salt tabs with meals     Hypokalemia  - Corrected to 4.5 on day of admission   - Nephrology will continue to follow and titrate   - High protein and high K diet     Thrombocytosis  - Plt 582 on admission   - monitor      Hypertension/Hypotension  Patient on Amlodipine 10 mg, Sotalol 80 mg BID and Hydralazine 25 mg q8h  Discontinue Hydralazine. 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 FLC ratio 1.74  -Referral to Hematology      Anemia:   -Hgb 12.1 on admission   - improving, continue to monitor     Hypomagnesemia  Improved, will continue to monitor    Insomnia  Start on Melatonin, if needed PRN Trazodone    Vitamin D Deficiency:  26 on admission. Started on 4000 U    DVT Ppx   Patient on Eliquis    Total time:  26 minutes.  I spent greater than 50% of the time for patient care, counseling, and coordination on this date, including unit/floor time, and face-to-face time with the patient as per interval events and assessment and plan above. Topics discussed included low SBP, reviewed fluid restrictions and SIADH, and decrease Amlodipine.     Td Andrews M.D.

## 2020-10-01 NOTE — CARE PLAN
Problem: Communication  Goal: The ability to communicate needs accurately and effectively will improve  Outcome: PROGRESSING AS EXPECTED  Note: Pt is able to communicate his needs effectively to staff.      Problem: Safety  Goal: Will remain free from injury  Outcome: PROGRESSING AS EXPECTED  Note: Pt uses call light consistently for staff assistance. Pt has good safety awareness, no impulsivity observed.      Problem: Venous Thromboembolism (VTW)/Deep Vein Thrombosis (DVT) Prevention:  Goal: Patient will participate in Venous Thrombosis (VTE)/Deep Vein Thrombosis (DVT)Prevention Measures  Outcome: PROGRESSING AS EXPECTED  Note: Pt takes Apixaban for DVT prophylaxis.

## 2020-10-01 NOTE — THERAPY
Physical Therapy   Daily Treatment     Patient Name: Jordan Alexander  Age:  75 y.o., Sex:  male  Medical Record #: [id number]  Today's Date: 10/1/2020     Precautions  Precautions: Fall Risk  Comments: memory deficits, orhtostatic hypotension    Subjective    Patient agreeable to PT; only one report of dizziness entire session, when first standing to do balance training about 35 min into session.     Objective       10/01/20 1031   Vitals   O2 (LPM) 0   O2 Delivery Device None - Room Air   Gait Functional Level of Assist    Gait Level Of Assist Contact Guard Assist   Assistive Device Other (Comments)  (gait belt)   Distance (Feet) 345  (indoors)   # of Times Distance was Traveled 4   Deviation Trendelenberg;Bradykinetic;Decreased Toe Off   Wheelchair Functional Level of Assist   Wheelchair Assist Supervised   Distance Wheelchair (Feet or Distance) 150   Wheelchair Description Extra time;Leg rest management;Limited by fatigue;Safety concerns;Supervision for safety;Verbal cueing   Bed Mobility    Sit to Stand Contact Guard Assist   Scooting Stand by Assist   Neuro-Muscular Treatments   Comments Wii Fit Plus with use of balance board for lateral and A-P weight shifts; CGA with gait belt when BUE support is present, Min A without along with increased anxiety without UE support; 3x 4-6 minutes.   Interdisciplinary Plan of Care Collaboration   IDT Collaboration with  Nursing;Physical Therapist   Patient Position at End of Therapy Seated;Chair Alarm On;Self Releasing Lap Belt Applied;Call Light within Reach;Tray Table within Reach;Phone within Reach  (at bedside table for lunch)   Collaboration Comments handoff at end of session; discussed pt's CLOF and orthostatic hypotension with PT   PT Total Time Spent   PT Individual Total Time Spent (Mins) 60   PT Charge Group   PT Gait Training 2   PT Neuromuscular Re-Education / Balance 2       Assessment    Patient has impaired but improving endurance; decreased anterior weight shifting  with balance training but improves with repetition; pt would like to be able to return to golf 1x/week, unable to work on outdoor golf/balance training due to poor air quality today; good motivation.    Strengths: Able to follow instructions, Adequate strength, Good insight into deficits/needs, Independent prior level of function, Motivated for self care and independence, Pleasant and cooperative, Supportive family, Willingly participates in therapeutic activities  Barriers: Decreased endurance, Impaired activity tolerance, Impaired balance(lightheadedness)    Plan    LE strength and endurance, balance activities as able, ambulation without AD, assess curbs/ stairs, monitor for orthostatic hypotension.     Physical Therapy Problems     Problem: Balance     Dates: Start: 09/26/20       Goal: STG-Within one week, patient will     Dates: Start: 09/26/20       Description: 1) Individualized goal:  tolerate Jimenez Balance Scale  2) Interventions:  PT Group Therapy, PT Gait Training, PT Therapeutic Exercises, PT Neuro Re-Ed/Balance, PT Therapeutic Activity, and PT Evaluation      Note:     Goal Note filed on 09/29/20 1107 by Jocelyn Fernandez, PT    To be completed this afternoon                        Problem: Mobility     Dates: Start: 09/26/20       Goal: STG-Within one week, patient will ambulate community distances     Dates: Start: 09/26/20       Description: 1) Individualized goal: 150 ft with no AD and supervision.  2) Interventions:  PT Group Therapy, PT Gait Training, PT Therapeutic Exercises, PT Neuro Re-Ed/Balance, PT Therapeutic Activity, and PT Evaluation    Note:     Goal Note filed on 09/29/20 1107 by Jocelyn Fernandez, PT    CGA no AD                  Goal: STG-Within one week, patient will ambulate up/down a curb     Dates: Start: 09/26/20       Description: 1) Individualized goal: with BUE support and CGA.  2) Interventions:  PT Group Therapy, PT Gait Training, PT Therapeutic Exercises, PT Neuro Re-Ed/Balance, PT  Therapeutic Activity, and PT Evaluation    Note:     Goal Note filed on 09/29/20 1107 by Jocelyn Fernandez, PT    To be assessed                         Problem: Mobility Transfers     Dates: Start: 09/26/20       Goal: STG-Within one week, patient will perform bed mobility     Dates: Start: 09/26/20       Description: 1) Individualized goal: independently.  2) Interventions:  PT Group Therapy, PT Gait Training, PT Therapeutic Exercises, PT Neuro Re-Ed/Balance, PT Therapeutic Activity, and PT Evaluation    Note:     Goal Note filed on 09/29/20 1107 by Jocelyn Fernandez, PT    To be assessed                   Goal: STG-Within one week, patient will transfer bed to chair     Dates: Start: 09/26/20       Description: 1) Individualized goal: with no AD and supervision.  2) Interventions:  PT Group Therapy, PT Gait Training, PT Therapeutic Exercises, PT Neuro Re-Ed/Balance, PT Therapeutic Activity, and PT Evaluation    Note:     Goal Note filed on 09/29/20 1107 by Jocelyn Fernandez, PT    To be assessed                         Problem: PT-Long Term Goals     Dates: Start: 09/26/20       Goal: LTG-By discharge, patient will ambulate     Dates: Start: 09/26/20       Description: 1) Individualized goal: 200 ft with no AD mod I.  2) Interventions:  PT Group Therapy, PT Gait Training, PT Therapeutic Exercises, PT Neuro Re-Ed/Balance, PT Therapeutic Activity, and PT Evaluation          Goal: LTG-By discharge, patient will transfer one surface to another     Dates: Start: 09/26/20       Description: 1) Individualized goal: with no AD mod I.  2) Interventions:  PT Group Therapy, PT Gait Training, PT Therapeutic Exercises, PT Neuro Re-Ed/Balance, PT Therapeutic Activity, and PT Evaluation          Goal: LTG-By discharge, patient will perform home exercise program     Dates: Start: 09/26/20       Description: 1) Individualized goal: with handout mod I.  2) Interventions:  PT Group Therapy, PT Gait Training, PT Therapeutic Exercises, PT Neuro  Re-Ed/Balance, PT Therapeutic Activity, and PT Evaluation          Goal: LTG-By discharge, patient will transfer in/out of a car     Dates: Start: 09/26/20       Description: 1) Individualized goal: with no AD and supervision.  2) Interventions:  PT Group Therapy, PT Gait Training, PT Therapeutic Exercises, PT Neuro Re-Ed/Balance, PT Therapeutic Activity, and PT Evaluation          Goal: LTG-By discharge, patient will     Dates: Start: 09/26/20       Description: 1) Individualized goal: improve Jimenez Balance Scale by >= 5 points to achieve MDC.  2) Interventions:  PT Group Therapy, PT Gait Training, PT Therapeutic Exercises, PT Neuro Re-Ed/Balance, PT Therapeutic Activity, and PT Evaluation          Goal: LTG-By discharge, patient will     Dates: Start: 09/26/20       Description: 1) Individualized goal: negotiate 1-2 steps with BUE support and supervision.  2) Interventions:  PT Group Therapy, PT Gait Training, PT Therapeutic Exercises, PT Neuro Re-Ed/Balance, PT Therapeutic Activity, and PT Evaluation                     Tetracycline Counseling: Patient counseled regarding possible photosensitivity and increased risk for sunburn.  Patient instructed to avoid sunlight, if possible.  When exposed to sunlight, patients should wear protective clothing, sunglasses, and sunscreen.  The patient was instructed to call the office immediately if the following severe adverse effects occur:  hearing changes, easy bruising/bleeding, severe headache, or vision changes.  The patient verbalized understanding of the proper use and possible adverse effects of tetracycline.  All of the patient's questions and concerns were addressed. Patient understands to avoid pregnancy while on therapy due to potential birth defects. Use Enhanced Medication Counseling?: No Doxycycline Counseling:  Patient counseled regarding possible photosensitivity and increased risk for sunburn.  Patient instructed to avoid sunlight, if possible.  When exposed to sunlight, patients should wear protective clothing, sunglasses, and sunscreen.  The patient was instructed to call the office immediately if the following severe adverse effects occur:  hearing changes, easy bruising/bleeding, severe headache, or vision changes.  The patient verbalized understanding of the proper use and possible adverse effects of doxycycline.  All of the patient's questions and concerns were addressed. Topical Clindamycin Counseling: Patient counseled that this medication may cause skin irritation or allergic reactions.  In the event of skin irritation, the patient was advised to reduce the amount of the drug applied or use it less frequently.   The patient verbalized understanding of the proper use and possible adverse effects of clindamycin.  All of the patient's questions and concerns were addressed. Benzoyl Peroxide Pregnancy And Lactation Text: This medication is Pregnancy Category C. It is unknown if benzoyl peroxide is excreted in breast milk. Topical Sulfur Applications Pregnancy And Lactation Text: This medication is Pregnancy Category C and has an unknown safety profile during pregnancy. It is unknown if this topical medication is excreted in breast milk. Benzoyl Peroxide Counseling: Patient counseled that medicine may cause skin irritation and bleach clothing.  In the event of skin irritation, the patient was advised to reduce the amount of the drug applied or use it less frequently.   The patient verbalized understanding of the proper use and possible adverse effects of benzoyl peroxide.  All of the patient's questions and concerns were addressed. Minocycline Pregnancy And Lactation Text: This medication is Pregnancy Category D and not consider safe during pregnancy. It is also excreted in breast milk. Topical Clindamycin Pregnancy And Lactation Text: This medication is Pregnancy Category B and is considered safe during pregnancy. It is unknown if it is excreted in breast milk. Birth Control Pills Pregnancy And Lactation Text: This medication should be avoided if pregnant and for the first 30 days post-partum. Dapsone Pregnancy And Lactation Text: This medication is Pregnancy Category C and is not considered safe during pregnancy or breast feeding. Erythromycin Counseling:  I discussed with the patient the risks of erythromycin including but not limited to GI upset, allergic reaction, drug rash, diarrhea, increase in liver enzymes, and yeast infections. Minocycline Counseling: Patient advised regarding possible photosensitivity and discoloration of the teeth, skin, lips, tongue and gums.  Patient instructed to avoid sunlight, if possible.  When exposed to sunlight, patients should wear protective clothing, sunglasses, and sunscreen.  The patient was instructed to call the office immediately if the following severe adverse effects occur:  hearing changes, easy bruising/bleeding, severe headache, or vision changes.  The patient verbalized understanding of the proper use and possible adverse effects of minocycline.  All of the patient's questions and concerns were addressed. Topical Sulfur Applications Counseling: Topical Sulfur Counseling: Patient counseled that this medication may cause skin irritation or allergic reactions.  In the event of skin irritation, the patient was advised to reduce the amount of the drug applied or use it less frequently.   The patient verbalized understanding of the proper use and possible adverse effects of topical sulfur application.  All of the patient's questions and concerns were addressed. Dapsone Counseling: I discussed with the patient the risks of dapsone including but not limited to hemolytic anemia, agranulocytosis, rashes, methemoglobinemia, kidney failure, peripheral neuropathy, headaches, GI upset, and liver toxicity.  Patients who start dapsone require monitoring including baseline LFTs and weekly CBCs for the first month, then every month thereafter.  The patient verbalized understanding of the proper use and possible adverse effects of dapsone.  All of the patient's questions and concerns were addressed. Topical Retinoid counseling:  Patient advised to apply a pea-sized amount only at bedtime and wait 30 minutes after washing their face before applying.  If too drying, patient may add a non-comedogenic moisturizer. The patient verbalized understanding of the proper use and possible adverse effects of retinoids.  All of the patient's questions and concerns were addressed. Azithromycin Counseling:  I discussed with the patient the risks of azithromycin including but not limited to GI upset, allergic reaction, drug rash, diarrhea, and yeast infections. Isotretinoin Counseling: Patient should get monthly blood tests, not donate blood, not drive at night if vision affected, not share medication, and not undergo elective surgery for 6 months after tx completed. Side effects reviewed, pt to contact office should one occur. Azithromycin Pregnancy And Lactation Text: This medication is considered safe during pregnancy and is also secreted in breast milk. Doxycycline Pregnancy And Lactation Text: This medication is Pregnancy Category D and not consider safe during pregnancy. It is also excreted in breast milk but is considered safe for shorter treatment courses. Erythromycin Pregnancy And Lactation Text: This medication is Pregnancy Category B and is considered safe during pregnancy. It is also excreted in breast milk. Topical Retinoid Pregnancy And Lactation Text: This medication is Pregnancy Category C. It is unknown if this medication is excreted in breast milk. Tazorac Counseling:  Patient advised that medication is irritating and drying.  Patient may need to apply sparingly and wash off after an hour before eventually leaving it on overnight.  The patient verbalized understanding of the proper use and possible adverse effects of tazorac.  All of the patient's questions and concerns were addressed. High Dose Vitamin A Pregnancy And Lactation Text: High dose vitamin A therapy is contraindicated during pregnancy and breast feeding. Detail Level: Zone Bactrim Pregnancy And Lactation Text: This medication is Pregnancy Category D and is known to cause fetal risk.  It is also excreted in breast milk. Spironolactone Counseling: Patient advised regarding risks of diarrhea, abdominal pain, hyperkalemia, birth defects (for female patients), liver toxicity and renal toxicity. The patient may need blood work to monitor liver and kidney function and potassium levels while on therapy. The patient verbalized understanding of the proper use and possible adverse effects of spironolactone.  All of the patient's questions and concerns were addressed. Tazorac Pregnancy And Lactation Text: This medication is not safe during pregnancy. It is unknown if this medication is excreted in breast milk. High Dose Vitamin A Counseling: Side effects reviewed, pt to contact office should one occur. Birth Control Pills Counseling: Birth Control Pill Counseling: I discussed with the patient the potential side effects of OCPs including but not limited to increased risk of stroke, heart attack, thrombophlebitis, deep venous thrombosis, hepatic adenomas, breast changes, GI upset, headaches, and depression.  The patient verbalized understanding of the proper use and possible adverse effects of OCPs. All of the patient's questions and concerns were addressed. Spironolactone Pregnancy And Lactation Text: This medication can cause feminization of the male fetus and should be avoided during pregnancy. The active metabolite is also found in breast milk. Bactrim Counseling:  I discussed with the patient the risks of sulfa antibiotics including but not limited to GI upset, allergic reaction, drug rash, diarrhea, dizziness, photosensitivity, and yeast infections.  Rarely, more serious reactions can occur including but not limited to aplastic anemia, agranulocytosis, methemoglobinemia, blood dyscrasias, liver or kidney failure, lung infiltrates or desquamative/blistering drug rashes. Isotretinoin Pregnancy And Lactation Text: This medication is Pregnancy Category X and is considered extremely dangerous during pregnancy. It is unknown if it is excreted in breast milk.

## 2020-10-01 NOTE — PROGRESS NOTES
"Rehab Progress Note     Encounter Date: 9/30/2020    CC: encephalopathy, decreased mobility    Interval Events (Subjective)  Patient sitting up in room. He reports therapy is going well. Discussed discharge planning for next week. He reports he will be doing some traveling this month and will discuss timing with family. He reports occasional dizziness. SBP has been labile, 130 to 88. Discussed would review medications.  Discussed that his salt levels remained stable and he can continue on 1.5L restriction.     IDT Team Meeting 9/29/2020  DC/Disposition:  10/6/20    Objective:  VITAL SIGNS: /51   Pulse 72   Temp 36.3 °C (97.4 °F) (Temporal)   Resp 18   Ht 1.702 m (5' 7\")   Wt 70.5 kg (155 lb 6.8 oz)   SpO2 92%   BMI 24.34 kg/m²   Gen: NAD  Psych: Mood and affect appropriate  CV: RRR, no edema  Resp: CTAB, no upper airway sounds  Abd: NTND  Neuro: AOx4, 5/5 BUE    Recent Results (from the past 72 hour(s))   CBC WITH DIFFERENTIAL    Collection Time: 09/30/20  6:58 AM   Result Value Ref Range    WBC 6.3 4.8 - 10.8 K/uL    RBC 3.12 (L) 4.70 - 6.10 M/uL    Hemoglobin 11.2 (L) 14.0 - 18.0 g/dL    Hematocrit 32.3 (L) 42.0 - 52.0 %    .5 (H) 81.4 - 97.8 fL    MCH 35.9 (H) 27.0 - 33.0 pg    MCHC 34.7 33.7 - 35.3 g/dL    RDW 48.0 35.9 - 50.0 fL    Platelet Count 432 164 - 446 K/uL    MPV 10.9 9.0 - 12.9 fL    Neutrophils-Polys 70.50 44.00 - 72.00 %    Lymphocytes 14.90 (L) 22.00 - 41.00 %    Monocytes 11.60 0.00 - 13.40 %    Eosinophils 1.90 0.00 - 6.90 %    Basophils 0.80 0.00 - 1.80 %    Immature Granulocytes 0.30 0.00 - 0.90 %    Nucleated RBC 0.00 /100 WBC    Neutrophils (Absolute) 4.44 1.82 - 7.42 K/uL    Lymphs (Absolute) 0.94 (L) 1.00 - 4.80 K/uL    Monos (Absolute) 0.73 0.00 - 0.85 K/uL    Eos (Absolute) 0.12 0.00 - 0.51 K/uL    Baso (Absolute) 0.05 0.00 - 0.12 K/uL    Immature Granulocytes (abs) 0.02 0.00 - 0.11 K/uL    NRBC (Absolute) 0.00 K/uL   Basic Metabolic Panel    Collection Time: 09/30/20  " 6:58 AM   Result Value Ref Range    Sodium 132 (L) 135 - 145 mmol/L    Potassium 3.6 3.6 - 5.5 mmol/L    Chloride 94 (L) 96 - 112 mmol/L    Co2 25 20 - 33 mmol/L    Glucose 101 (H) 65 - 99 mg/dL    Bun 27 (H) 8 - 22 mg/dL    Creatinine 0.73 0.50 - 1.40 mg/dL    Calcium 9.2 8.5 - 10.5 mg/dL    Anion Gap 13.0 7.0 - 16.0   MAGNESIUM    Collection Time: 09/30/20  6:58 AM   Result Value Ref Range    Magnesium 1.4 (L) 1.5 - 2.5 mg/dL   ESTIMATED GFR    Collection Time: 09/30/20  6:58 AM   Result Value Ref Range    GFR If African American >60 >60 mL/min/1.73 m 2    GFR If Non African American >60 >60 mL/min/1.73 m 2       Current Facility-Administered Medications   Medication Frequency   • melatonin tablet 3 mg QHS   • traZODone (DESYREL) tablet 50 mg QHS PRN   • artificial tears ophthalmic solution 1 Drop Q2HRS PRN   • vitamin D (cholecalciferol) tablet 4,000 Units DAILY   • senna-docusate (PERICOLACE or SENOKOT S) 8.6-50 MG per tablet 2 Tab BID    And   • polyethylene glycol/lytes (MIRALAX) PACKET 1 Packet QDAY PRN    And   • magnesium hydroxide (MILK OF MAGNESIA) suspension 30 mL QDAY PRN    And   • bisacodyl (DULCOLAX) suppository 10 mg QDAY PRN   • Pharmacy Consult Request ...Pain Management Review 1 Each PHARMACY TO DOSE   • oxyCODONE immediate-release (ROXICODONE) tablet 2.5 mg Q3HRS PRN   • Respiratory Therapy Consult Continuous RT   • acetaminophen (TYLENOL) tablet 500 mg 4X/DAY   • amLODIPine (NORVASC) tablet 10 mg QHS   • apixaban (ELIQUIS) tablet 2.5 mg BID   • budesonide-formoterol (SYMBICORT) 160-4.5 MCG/ACT inhaler 2 Puff BID PRN   • ferrous sulfate tablet 325 mg Q48HRS   • hydrALAZINE (APRESOLINE) tablet 25 mg Q8HRS   • lidocaine (LIDODERM) 5 % 1 Patch QDAY PRN   • montelukast (SINGULAIR) tablet 10 mg DAILY   • simvastatin (ZOCOR) tablet 20 mg Q EVENING   • sodium chloride (SALT) tablet 1 g TID WITH MEALS   • sotalol (BETAPACE) tablet 80 mg TWICE DAILY   • thiamine tablet 100 mg DAILY       Orders Placed  This Encounter   Procedures   • Diet Order Regular (whole meds )     Standing Status:   Standing     Number of Occurrences:   1     Order Specific Question:   Diet:     Answer:   Regular [1]     Comments:   whole meds      Order Specific Question:   Nutrient modifications:     Answer:   High Protein [4]     Order Specific Question:   Electrolyte modifications:     Answer:   High Potassium [4]     Order Specific Question:   Texture Modifier     Answer:   Level 7 - Regular/Easy to Chew     Order Specific Question:   Liquid level     Answer:   Level 0 - Thin     Order Specific Question:   Consistency/Fluid modifications:     Answer:   1500 ml Fluid Restriction [9]       Assessment:  Active Hospital Problems    Diagnosis   • *Acute encephalopathy   • Hypokalemia   • Back pain   • Hypomagnesemia   • Paroxysmal atrial fibrillation (HCC)       Medical Decision Making and Plan:  Acute encephalopathy  - Etiology hypertensive vs metabolic   - No stroke found   - EEG with encephalopathy, no seizure captured.   - Admit to [facility]   - Evals by PT/OT and SLP on arrival depending on schedule      Dypshagia  SLP to consult.  [facility] [place] on 9/28/20 - advanced to regular    Pain  - Tylenol , lidoderm patch     PAF   - on eliquis, sotalol, amlodipine, apixaban, hydralazine, simvastatin     Pulm  - montelukast      Hyponatremia/SIADH  - Na 127 on day of admission   - 1L fluid restriction -> 1.5 L as becoming volume depleted  - Salt tabs 1g TID, continue  - Appreciate Nephrology recommendations. 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 FLC ratio 1.74  -Referral to Hematology     Anemia:   -Hgb 12.1 on admission   - improving, continue to monitor     Hypomagnesemia  Improved, will continue to monitor    Insomnia  Start on Melatonin, if needed PRN Trazodone    Vitamin D Deficiency:  26 on admission. Started on 4000 U    DVT Ppx   Patient on Eliquis    Total time:  26 minutes.  I spent greater than 50% of the time for patient care, counseling, and coordination on this date, including unit/floor time, and face-to-face time with the patient as per interval events and assessment and plan above. Topics discussed included labile SBP, discontinue Hydralazine and continue salt tabs TID with 1.5L fluid restriction.     Td Andrews M.D.

## 2020-10-02 LAB
ANION GAP SERPL CALC-SCNC: 13 MMOL/L (ref 7–16)
BASOPHILS # BLD AUTO: 0.8 % (ref 0–1.8)
BASOPHILS # BLD: 0.05 K/UL (ref 0–0.12)
BUN SERPL-MCNC: 16 MG/DL (ref 8–22)
CALCIUM SERPL-MCNC: 9.5 MG/DL (ref 8.5–10.5)
CHLORIDE SERPL-SCNC: 94 MMOL/L (ref 96–112)
CO2 SERPL-SCNC: 25 MMOL/L (ref 20–33)
CREAT SERPL-MCNC: 0.62 MG/DL (ref 0.5–1.4)
EOSINOPHIL # BLD AUTO: 0.15 K/UL (ref 0–0.51)
EOSINOPHIL NFR BLD: 2.4 % (ref 0–6.9)
ERYTHROCYTE [DISTWIDTH] IN BLOOD BY AUTOMATED COUNT: 49.1 FL (ref 35.9–50)
GLUCOSE SERPL-MCNC: 116 MG/DL (ref 65–99)
HCT VFR BLD AUTO: 34 % (ref 42–52)
HGB BLD-MCNC: 11.5 G/DL (ref 14–18)
IMM GRANULOCYTES # BLD AUTO: 0.03 K/UL (ref 0–0.11)
IMM GRANULOCYTES NFR BLD AUTO: 0.5 % (ref 0–0.9)
LYMPHOCYTES # BLD AUTO: 0.92 K/UL (ref 1–4.8)
LYMPHOCYTES NFR BLD: 14.9 % (ref 22–41)
MCH RBC QN AUTO: 35.4 PG (ref 27–33)
MCHC RBC AUTO-ENTMCNC: 33.8 G/DL (ref 33.7–35.3)
MCV RBC AUTO: 104.6 FL (ref 81.4–97.8)
MONOCYTES # BLD AUTO: 0.47 K/UL (ref 0–0.85)
MONOCYTES NFR BLD AUTO: 7.6 % (ref 0–13.4)
NEUTROPHILS # BLD AUTO: 4.57 K/UL (ref 1.82–7.42)
NEUTROPHILS NFR BLD: 73.8 % (ref 44–72)
NRBC # BLD AUTO: 0 K/UL
NRBC BLD-RTO: 0 /100 WBC
PLATELET # BLD AUTO: 518 K/UL (ref 164–446)
PMV BLD AUTO: 11 FL (ref 9–12.9)
POTASSIUM SERPL-SCNC: 3.5 MMOL/L (ref 3.6–5.5)
RBC # BLD AUTO: 3.25 M/UL (ref 4.7–6.1)
SODIUM SERPL-SCNC: 132 MMOL/L (ref 135–145)
WBC # BLD AUTO: 6.2 K/UL (ref 4.8–10.8)

## 2020-10-02 PROCEDURE — 80048 BASIC METABOLIC PNL TOTAL CA: CPT

## 2020-10-02 PROCEDURE — 97530 THERAPEUTIC ACTIVITIES: CPT

## 2020-10-02 PROCEDURE — 97535 SELF CARE MNGMENT TRAINING: CPT

## 2020-10-02 PROCEDURE — 99232 SBSQ HOSP IP/OBS MODERATE 35: CPT | Performed by: PHYSICAL MEDICINE & REHABILITATION

## 2020-10-02 PROCEDURE — 85025 COMPLETE CBC W/AUTO DIFF WBC: CPT

## 2020-10-02 PROCEDURE — 36415 COLL VENOUS BLD VENIPUNCTURE: CPT

## 2020-10-02 PROCEDURE — A9270 NON-COVERED ITEM OR SERVICE: HCPCS | Performed by: PHYSICAL MEDICINE & REHABILITATION

## 2020-10-02 PROCEDURE — 770010 HCHG ROOM/CARE - REHAB SEMI PRIVAT*

## 2020-10-02 PROCEDURE — A9270 NON-COVERED ITEM OR SERVICE: HCPCS | Performed by: NURSE PRACTITIONER

## 2020-10-02 PROCEDURE — 700102 HCHG RX REV CODE 250 W/ 637 OVERRIDE(OP): Performed by: PHYSICAL MEDICINE & REHABILITATION

## 2020-10-02 PROCEDURE — 97130 THER IVNTJ EA ADDL 15 MIN: CPT

## 2020-10-02 PROCEDURE — 700102 HCHG RX REV CODE 250 W/ 637 OVERRIDE(OP): Performed by: NURSE PRACTITIONER

## 2020-10-02 PROCEDURE — 97116 GAIT TRAINING THERAPY: CPT

## 2020-10-02 PROCEDURE — 97129 THER IVNTJ 1ST 15 MIN: CPT

## 2020-10-02 PROCEDURE — 97112 NEUROMUSCULAR REEDUCATION: CPT

## 2020-10-02 RX ORDER — AMOXICILLIN 250 MG
2 CAPSULE ORAL 2 TIMES DAILY PRN
Status: DISCONTINUED | OUTPATIENT
Start: 2020-10-02 | End: 2020-10-06 | Stop reason: HOSPADM

## 2020-10-02 RX ORDER — BISACODYL 10 MG
10 SUPPOSITORY, RECTAL RECTAL
Status: DISCONTINUED | OUTPATIENT
Start: 2020-10-02 | End: 2020-10-06 | Stop reason: HOSPADM

## 2020-10-02 RX ORDER — ACETAMINOPHEN 500 MG
500 TABLET ORAL 3 TIMES DAILY
Status: DISCONTINUED | OUTPATIENT
Start: 2020-10-02 | End: 2020-10-06 | Stop reason: HOSPADM

## 2020-10-02 RX ORDER — POLYETHYLENE GLYCOL 3350 17 G/17G
1 POWDER, FOR SOLUTION ORAL
Status: DISCONTINUED | OUTPATIENT
Start: 2020-10-02 | End: 2020-10-06 | Stop reason: HOSPADM

## 2020-10-02 RX ORDER — LANOLIN ALCOHOL/MO/W.PET/CERES
6 CREAM (GRAM) TOPICAL
Status: DISCONTINUED | OUTPATIENT
Start: 2020-10-02 | End: 2020-10-06 | Stop reason: HOSPADM

## 2020-10-02 RX ADMIN — MELATONIN 6 MG: at 20:20

## 2020-10-02 RX ADMIN — THIAMINE HCL TAB 100 MG 100 MG: 100 TAB at 08:35

## 2020-10-02 RX ADMIN — SIMVASTATIN 20 MG: 20 TABLET, FILM COATED ORAL at 20:20

## 2020-10-02 RX ADMIN — CHOLECALCIFEROL TAB 25 MCG (1000 UNIT) 4000 UNITS: 25 TAB at 08:31

## 2020-10-02 RX ADMIN — APIXABAN 2.5 MG: 5 TABLET, FILM COATED ORAL at 08:30

## 2020-10-02 RX ADMIN — SOTALOL HYDROCHLORIDE 80 MG: 80 TABLET ORAL at 05:25

## 2020-10-02 RX ADMIN — ACETAMINOPHEN 500 MG: 500 TABLET, FILM COATED ORAL at 20:20

## 2020-10-02 RX ADMIN — TRAZODONE HYDROCHLORIDE 50 MG: 50 TABLET ORAL at 20:20

## 2020-10-02 RX ADMIN — SOTALOL HYDROCHLORIDE 80 MG: 80 TABLET ORAL at 17:30

## 2020-10-02 RX ADMIN — Medication 1 G: at 08:29

## 2020-10-02 RX ADMIN — Medication 1 G: at 17:30

## 2020-10-02 RX ADMIN — APIXABAN 2.5 MG: 5 TABLET, FILM COATED ORAL at 20:20

## 2020-10-02 RX ADMIN — MONTELUKAST 10 MG: 10 TABLET, FILM COATED ORAL at 08:30

## 2020-10-02 RX ADMIN — AMLODIPINE BESYLATE 5 MG: 5 TABLET ORAL at 20:20

## 2020-10-02 RX ADMIN — Medication 1 G: at 11:41

## 2020-10-02 RX ADMIN — ACETAMINOPHEN 500 MG: 500 TABLET, FILM COATED ORAL at 08:29

## 2020-10-02 RX ADMIN — ACETAMINOPHEN 500 MG: 500 TABLET, FILM COATED ORAL at 13:56

## 2020-10-02 ASSESSMENT — PATIENT HEALTH QUESTIONNAIRE - PHQ9
2. FEELING DOWN, DEPRESSED, IRRITABLE, OR HOPELESS: NOT AT ALL
1. LITTLE INTEREST OR PLEASURE IN DOING THINGS: NOT AT ALL
SUM OF ALL RESPONSES TO PHQ9 QUESTIONS 1 AND 2: 0

## 2020-10-02 ASSESSMENT — ACTIVITIES OF DAILY LIVING (ADL)
TOILETING_LEVEL_OF_ASSIST_DESCRIPTION: GRAB BAR;INCREASED TIME
BED_CHAIR_WHEELCHAIR_TRANSFER_DESCRIPTION: INCREASED TIME;SUPERVISION FOR SAFETY
TOILET_TRANSFER_DESCRIPTION: GRAB BAR;INCREASED TIME
TOILET_TRANSFER_DESCRIPTION: VERBAL CUEING;SUPERVISION FOR SAFETY
TOILETING_LEVEL_OF_ASSIST_DESCRIPTION: GRAB BAR;SUPERVISION FOR SAFETY;INCREASED TIME

## 2020-10-02 ASSESSMENT — GAIT ASSESSMENTS
GAIT LEVEL OF ASSIST: STAND BY ASSIST
DISTANCE (FEET): 350
DEVIATION: BRADYKINETIC;DECREASED TOE OFF
DEVIATION: BRADYKINETIC;DECREASED TOE OFF
GAIT LEVEL OF ASSIST: STAND BY ASSIST
DISTANCE (FEET): 175

## 2020-10-02 NOTE — THERAPY
Physical Therapy   Daily Treatment     Patient Name: Jordan Alexander  Age:  75 y.o., Sex:  male  Medical Record #: [id number]  Today's Date: 10/2/2020     Precautions  Precautions: Fall Risk  Comments: memory deficits, orhtostatic hypotension    Subjective    Pt seated in room, agreeable to PT.      Objective       10/02/20 1301   Precautions   Precautions Fall Risk   Comments memory deficits, orhtostatic hypotension   Gait Functional Level of Assist    Gait Level Of Assist Stand by Assist   Assistive Device None   Distance (Feet) 350  (outdoors on pavement, slight incline/ decline)   # of Times Distance was Traveled 2   Deviation Bradykinetic;Decreased Toe Off   Neuro-Muscular Treatments   Neuro-Muscular Treatments Verbal Cuing;Sequencing;Weight Shift Right;Weight Shift Left   Comments Balance and standing tolerance activities in // bars: ball catch/ toss 1x1 min (limited by dizziness), 1x 2 min; balloon volley 5 min, in // bars with intermittent UE support and SBA   Interdisciplinary Plan of Care Collaboration   Patient Position at End of Therapy Seated;Call Light within Reach;Tray Table within Reach   PT Total Time Spent   PT Individual Total Time Spent (Mins) 30   PT Charge Group   PT Gait Training 1   PT Neuromuscular Re-Education / Balance 1       Assessment    Pt continues to be limited by intermittent dizziness in standing. Reminders to slow transition and move LEs prior to standing up.     Strengths: Able to follow instructions, Adequate strength, Good insight into deficits/needs, Independent prior level of function, Motivated for self care and independence, Pleasant and cooperative, Supportive family, Willingly participates in therapeutic activities  Barriers: Decreased endurance, Impaired activity tolerance, Impaired balance(lightheadedness)    Plan    General mobility/ strength/ endurance, monitor for low back pain and orthostatic hypotension symptoms, balance training, core stability exercises for low back  pain management.     Physical Therapy Problems     Problem: Balance     Dates: Start: 09/26/20       Goal: STG-Within one week, patient will     Dates: Start: 09/26/20       Description: 1) Individualized goal:  tolerate Jimenez Balance Scale  2) Interventions:  PT Group Therapy, PT Gait Training, PT Therapeutic Exercises, PT Neuro Re-Ed/Balance, PT Therapeutic Activity, and PT Evaluation      Note:     Goal Note filed on 09/29/20 1107 by Jocelyn Fernandez, PT    To be completed this afternoon                        Problem: Mobility     Dates: Start: 09/26/20       Goal: STG-Within one week, patient will ambulate community distances     Dates: Start: 09/26/20       Description: 1) Individualized goal: 150 ft with no AD and supervision.  2) Interventions:  PT Group Therapy, PT Gait Training, PT Therapeutic Exercises, PT Neuro Re-Ed/Balance, PT Therapeutic Activity, and PT Evaluation    Note:     Goal Note filed on 09/29/20 1107 by Jocelyn Fernandez, PT    CGA no AD                  Goal: STG-Within one week, patient will ambulate up/down a curb     Dates: Start: 09/26/20       Description: 1) Individualized goal: with BUE support and CGA.  2) Interventions:  PT Group Therapy, PT Gait Training, PT Therapeutic Exercises, PT Neuro Re-Ed/Balance, PT Therapeutic Activity, and PT Evaluation    Note:     Goal Note filed on 09/29/20 1107 by Jocelyn Fernandez, PT    To be assessed                         Problem: Mobility Transfers     Dates: Start: 09/26/20       Goal: STG-Within one week, patient will perform bed mobility     Dates: Start: 09/26/20       Description: 1) Individualized goal: independently.  2) Interventions:  PT Group Therapy, PT Gait Training, PT Therapeutic Exercises, PT Neuro Re-Ed/Balance, PT Therapeutic Activity, and PT Evaluation    Note:     Goal Note filed on 09/29/20 1107 by Jocelyn Fernandez, PT    To be assessed                   Goal: STG-Within one week, patient will transfer bed to chair     Dates: Start: 09/26/20        Description: 1) Individualized goal: with no AD and supervision.  2) Interventions:  PT Group Therapy, PT Gait Training, PT Therapeutic Exercises, PT Neuro Re-Ed/Balance, PT Therapeutic Activity, and PT Evaluation    Note:     Goal Note filed on 09/29/20 1107 by Jocelyn Fernandez, PT    To be assessed                         Problem: PT-Long Term Goals     Dates: Start: 09/26/20       Goal: LTG-By discharge, patient will ambulate     Dates: Start: 09/26/20       Description: 1) Individualized goal: 200 ft with no AD mod I.  2) Interventions:  PT Group Therapy, PT Gait Training, PT Therapeutic Exercises, PT Neuro Re-Ed/Balance, PT Therapeutic Activity, and PT Evaluation          Goal: LTG-By discharge, patient will transfer one surface to another     Dates: Start: 09/26/20       Description: 1) Individualized goal: with no AD mod I.  2) Interventions:  PT Group Therapy, PT Gait Training, PT Therapeutic Exercises, PT Neuro Re-Ed/Balance, PT Therapeutic Activity, and PT Evaluation          Goal: LTG-By discharge, patient will perform home exercise program     Dates: Start: 09/26/20       Description: 1) Individualized goal: with handout mod I.  2) Interventions:  PT Group Therapy, PT Gait Training, PT Therapeutic Exercises, PT Neuro Re-Ed/Balance, PT Therapeutic Activity, and PT Evaluation          Goal: LTG-By discharge, patient will transfer in/out of a car     Dates: Start: 09/26/20       Description: 1) Individualized goal: with no AD and supervision.  2) Interventions:  PT Group Therapy, PT Gait Training, PT Therapeutic Exercises, PT Neuro Re-Ed/Balance, PT Therapeutic Activity, and PT Evaluation          Goal: LTG-By discharge, patient will     Dates: Start: 09/26/20       Description: 1) Individualized goal: improve Jimenez Balance Scale by >= 5 points to achieve MDC.  2) Interventions:  PT Group Therapy, PT Gait Training, PT Therapeutic Exercises, PT Neuro Re-Ed/Balance, PT Therapeutic Activity, and PT Evaluation           Goal: LTG-By discharge, patient will     Dates: Start: 09/26/20       Description: 1) Individualized goal: negotiate 1-2 steps with BUE support and supervision.  2) Interventions:  PT Group Therapy, PT Gait Training, PT Therapeutic Exercises, PT Neuro Re-Ed/Balance, PT Therapeutic Activity, and PT Evaluation

## 2020-10-02 NOTE — CARE PLAN
Problem: Communication  Goal: The ability to communicate needs accurately and effectively will improve  Description: Patient able to verbalize needs.  Will continue to monitor.   Outcome: PROGRESSING AS EXPECTED  Note: Patient able to verbalize needs.  Will continue to monitor.      Problem: Safety  Goal: Will remain free from injury  Description: Pt uses call light consistently and appropriately. Waits for assistance does not attempt self transfer this shift. Able to verbalize needs.   Outcome: PROGRESSING AS EXPECTED

## 2020-10-02 NOTE — CARE PLAN
Problem: Safety  Goal: Will remain free from injury  Description: Pt uses call light consistently and appropriately. Waits for assistance does not attempt self transfer this shift. Able to verbalize needs.   Outcome: PROGRESSING AS EXPECTED  Note: Patient verbalized will not get up without assist. Call button within reach.      Problem: Pain Management  Goal: Pain level will decrease to patient's comfort goal  Outcome: PROGRESSING AS EXPECTED  Note: Patient verbalized no pain for now. Patient said will inform RN if he needed PRN Roxicodone.

## 2020-10-02 NOTE — PROGRESS NOTES
Received patient on bed.conscious coherent not in cp distress. Patient verbalized no pain. Patient verbalized he need scheduled meds for sleep tonight. Patient was able to swallow pills without difficulty. Patient instructed that he can only have 1500 ml of fluid per day. Patient verbalized he understood that he can only have 1500 ml of fluid. Safety and fall precaution reinforced.

## 2020-10-02 NOTE — THERAPY
Speech Language Pathology  Daily Treatment     Patient Name: Jordan Alexander  Age:  75 y.o., Sex:  male  Medical Record #: [id number]  Today's Date: 10/2/2020     Precautions  Precautions: Fall Risk  Comments: memory deficits, orhtostatic hypotension    Subjective    Pt was pleasant and cooperative during this therapy session, wife present for family training      Objective       10/02/20 1101   Interdisciplinary Plan of Care Collaboration   IDT Collaboration with  Family / Caregiver   Patient Position at End of Therapy Seated;Family / Friend in Room   Collaboration Comments Pt's wife present for this ST session    SLP Total Time Spent   SLP Individual Total Time Spent (Mins) 30   Treatment Charges   SLP Cognitive Skill Development First 15 Minutes 1   SLP Cognitive Skill Development Additional 15 Minutes 1       Assessment    Family training completed with pt and pt's wife.  Both pt and his wife reported that pt seems close to his baseline in terms of cognition (prior memory deficits).  Reviewed pt's initial SCCAN scores showing deficits in the areas of memory and attention.  Pt's wife reported that she would be able to assist pt with managing medications and both were open to continuing ST as an outpatient.      Strengths: Able to follow instructions, Independent prior level of function, Pleasant and cooperative, Motivated for self care and independence, Willingly participates in therapeutic activities  Barriers: Aspiration risk, Impaired functional cognition    Plan    Continue targeting attention and functional memory tasks     Speech Therapy Problems     Problem: Memory STGs     Dates: Start: 09/26/20       Goal: STG-Within one week, patient will     Dates: Start: 09/26/20       Description: 1) Individualized goal:  Recall daily events, and safety strategies with use of external aid, 80% Jo-Ann.   2) Interventions:  SLP Cognitive Skill Development        Note:     Goal Note filed on 09/28/20 1534 by Aydin Foster  MS,CCC-SLP    Continue to target                         Problem: Problem Solving STGs     Dates: Start: 09/26/20       Goal: STG-Within one week, patient will     Dates: Start: 09/27/20       Description: 1) Individualized goal:  Complete executive function tasks related to medication management/finance, with 80% accuracy, given min verbal cues.    2) Interventions:  SLP Cognitive Skill Development and SLP Group Treatment        Note:     Goal Note filed on 09/28/20 1537 by Aydin Foster MS,CCC-SLP    Continue to target                         Problem: Speech/Swallowing LTGs     Dates: Start: 09/26/20       Goal: LTG-By discharge, patient will safely swallow     Dates: Start: 09/26/20       Description: 1) Individualized goal:  regular textures, and thin liquids, without aspiration.   2) Interventions:  SLP Swallowing Dysfunction Treatment, SLP Oral Pharyngeal Evaluation, SLP Video Swallow Evaluation, and SLP Group Treatment              Goal: LTG-By discharge, patient will solve complex problem     Dates: Start: 09/26/20       Description: 1) Individualized goal:  Marco for safe discharge home.   2) Interventions:  SLP Aphasia Evaluation, SLP Cognitive Skill Development, and SLP Group Treatment

## 2020-10-02 NOTE — THERAPY
Physical Therapy   Daily Treatment     Patient Name: Jordan Alexander  Age:  75 y.o., Sex:  male  Medical Record #: [id number]  Today's Date: 10/2/2020     Precautions  Precautions: Fall Risk  Comments: memory deficits, orhtostatic hypotension    Subjective    Pt seated in room with wife present, both agreeable to family training session.      Objective       10/02/20 1031   Precautions   Precautions Fall Risk   Comments memory deficits, orhtostatic hypotension   Gait Functional Level of Assist    Gait Level Of Assist Stand by Assist   Assistive Device None   Distance (Feet) 175   # of Times Distance was Traveled 2   Deviation Bradykinetic;Decreased Toe Off   Interdisciplinary Plan of Care Collaboration   IDT Collaboration with  Family / Caregiver;Nursing   Patient Position at End of Therapy Seated;Tray Table within Reach;Call Light within Reach   Collaboration Comments pt's wife present for family training session. RN notified wife has questions regarding BP medication   PT Total Time Spent   PT Individual Total Time Spent (Mins) 30   PT Charge Group   PT Therapeutic Activities 2     Pt and wife provided education/ participated in discussion regarding the following: low blood pressure and risk of falling secondary to dizziness upon standing, PT recommending slow sit > stand transitions, and movement of LEs (ankle pumps, marches LAQs) before standing, low back pain limiting function and supine exercises to assist, home safety and set-up including steps into home.     Assessment    Pt and wife receptive to education provided. Report they are comfortable and confident with 2 steps to enter home from garage with 2 grab bars/ rails. Pt with dizziness upon initial, dissipated after sitting back down and performing LE movement prior to next stand.     Strengths: Able to follow instructions, Adequate strength, Good insight into deficits/needs, Independent prior level of function, Motivated for self care and independence, Pleasant  and cooperative, Supportive family, Willingly participates in therapeutic activities  Barriers: Decreased endurance, Impaired activity tolerance, Impaired balance(lightheadedness)    Plan    General mobility/ strength/ endurance, monitor for low back pain and orthostatic hypotension symptoms, balance training.     Physical Therapy Problems     Problem: Balance     Dates: Start: 09/26/20       Goal: STG-Within one week, patient will     Dates: Start: 09/26/20       Description: 1) Individualized goal:  tolerate Jimenez Balance Scale  2) Interventions:  PT Group Therapy, PT Gait Training, PT Therapeutic Exercises, PT Neuro Re-Ed/Balance, PT Therapeutic Activity, and PT Evaluation      Note:     Goal Note filed on 09/29/20 1107 by Jocelyn Fernandez, PT    To be completed this afternoon                        Problem: Mobility     Dates: Start: 09/26/20       Goal: STG-Within one week, patient will ambulate community distances     Dates: Start: 09/26/20       Description: 1) Individualized goal: 150 ft with no AD and supervision.  2) Interventions:  PT Group Therapy, PT Gait Training, PT Therapeutic Exercises, PT Neuro Re-Ed/Balance, PT Therapeutic Activity, and PT Evaluation    Note:     Goal Note filed on 09/29/20 1107 by Jocelyn Fernandez, PT    CGA no AD                  Goal: STG-Within one week, patient will ambulate up/down a curb     Dates: Start: 09/26/20       Description: 1) Individualized goal: with BUE support and CGA.  2) Interventions:  PT Group Therapy, PT Gait Training, PT Therapeutic Exercises, PT Neuro Re-Ed/Balance, PT Therapeutic Activity, and PT Evaluation    Note:     Goal Note filed on 09/29/20 1107 by Jocelyn Fernandez, PT    To be assessed                         Problem: Mobility Transfers     Dates: Start: 09/26/20       Goal: STG-Within one week, patient will perform bed mobility     Dates: Start: 09/26/20       Description: 1) Individualized goal: independently.  2) Interventions:  PT Group Therapy, PT Gait  Training, PT Therapeutic Exercises, PT Neuro Re-Ed/Balance, PT Therapeutic Activity, and PT Evaluation    Note:     Goal Note filed on 09/29/20 1107 by Jocelyn Fernandez, PT    To be assessed                   Goal: STG-Within one week, patient will transfer bed to chair     Dates: Start: 09/26/20       Description: 1) Individualized goal: with no AD and supervision.  2) Interventions:  PT Group Therapy, PT Gait Training, PT Therapeutic Exercises, PT Neuro Re-Ed/Balance, PT Therapeutic Activity, and PT Evaluation    Note:     Goal Note filed on 09/29/20 1107 by Jocelyn Fernandez, PT    To be assessed                         Problem: PT-Long Term Goals     Dates: Start: 09/26/20       Goal: LTG-By discharge, patient will ambulate     Dates: Start: 09/26/20       Description: 1) Individualized goal: 200 ft with no AD mod I.  2) Interventions:  PT Group Therapy, PT Gait Training, PT Therapeutic Exercises, PT Neuro Re-Ed/Balance, PT Therapeutic Activity, and PT Evaluation          Goal: LTG-By discharge, patient will transfer one surface to another     Dates: Start: 09/26/20       Description: 1) Individualized goal: with no AD mod I.  2) Interventions:  PT Group Therapy, PT Gait Training, PT Therapeutic Exercises, PT Neuro Re-Ed/Balance, PT Therapeutic Activity, and PT Evaluation          Goal: LTG-By discharge, patient will perform home exercise program     Dates: Start: 09/26/20       Description: 1) Individualized goal: with handout mod I.  2) Interventions:  PT Group Therapy, PT Gait Training, PT Therapeutic Exercises, PT Neuro Re-Ed/Balance, PT Therapeutic Activity, and PT Evaluation          Goal: LTG-By discharge, patient will transfer in/out of a car     Dates: Start: 09/26/20       Description: 1) Individualized goal: with no AD and supervision.  2) Interventions:  PT Group Therapy, PT Gait Training, PT Therapeutic Exercises, PT Neuro Re-Ed/Balance, PT Therapeutic Activity, and PT Evaluation          Goal: LTG-By discharge,  patient will     Dates: Start: 09/26/20       Description: 1) Individualized goal: improve Jimenez Balance Scale by >= 5 points to achieve MDC.  2) Interventions:  PT Group Therapy, PT Gait Training, PT Therapeutic Exercises, PT Neuro Re-Ed/Balance, PT Therapeutic Activity, and PT Evaluation          Goal: LTG-By discharge, patient will     Dates: Start: 09/26/20       Description: 1) Individualized goal: negotiate 1-2 steps with BUE support and supervision.  2) Interventions:  PT Group Therapy, PT Gait Training, PT Therapeutic Exercises, PT Neuro Re-Ed/Balance, PT Therapeutic Activity, and PT Evaluation

## 2020-10-02 NOTE — THERAPY
Speech Language Pathology  Daily Treatment     Patient Name: Jordan Alexander  Age:  75 y.o., Sex:  male  Medical Record #: [id number]  Today's Date: 10/2/2020     Precautions  Precautions: Fall Risk  Comments: memory deficits, orhtostatic hypotension    Subjective    Pt was pleasant and cooperative during this ST session      Objective       10/02/20 1401   SLP Total Time Spent   SLP Individual Total Time Spent (Mins) 30   Treatment Charges   SLP Cognitive Skill Development First 15 Minutes 1   SLP Cognitive Skill Development Additional 15 Minutes 1       Assessment    Pt completed an attention task (Keeping in Mind task) requiring him to recall 3 rules and add together answers from 4 columns of numbers.  Pt was able to recall the 3 rules independently, but required min cues for organization to add long columns of numbers together accurately to achieve 100%     Strengths: Able to follow instructions, Independent prior level of function, Pleasant and cooperative, Motivated for self care and independence, Willingly participates in therapeutic activities  Barriers: Aspiration risk, Impaired functional cognition    Plan    Target attention and functional memory     Speech Therapy Problems     Problem: Memory STGs     Dates: Start: 09/26/20       Goal: STG-Within one week, patient will     Dates: Start: 09/26/20       Description: 1) Individualized goal:  Recall daily events, and safety strategies with use of external aid, 80% Jo-Ann.   2) Interventions:  SLP Cognitive Skill Development        Note:     Goal Note filed on 09/28/20 4494 by Aydin Foster MS,CCC-SLP    Continue to target                         Problem: Problem Solving STGs     Dates: Start: 09/26/20       Goal: STG-Within one week, patient will     Dates: Start: 09/27/20       Description: 1) Individualized goal:  Complete executive function tasks related to medication management/finance, with 80% accuracy, given min verbal cues.    2) Interventions:  SLP  Cognitive Skill Development and SLP Group Treatment        Note:     Goal Note filed on 09/28/20 1537 by Aydin Foster MS,CCC-SLP    Continue to target                         Problem: Speech/Swallowing LTGs     Dates: Start: 09/26/20       Goal: LTG-By discharge, patient will safely swallow     Dates: Start: 09/26/20       Description: 1) Individualized goal:  regular textures, and thin liquids, without aspiration.   2) Interventions:  SLP Swallowing Dysfunction Treatment, SLP Oral Pharyngeal Evaluation, SLP Video Swallow Evaluation, and SLP Group Treatment              Goal: LTG-By discharge, patient will solve complex problem     Dates: Start: 09/26/20       Description: 1) Individualized goal:  Marco for safe discharge home.   2) Interventions:  SLP Aphasia Evaluation, SLP Cognitive Skill Development, and SLP Group Treatment

## 2020-10-02 NOTE — DISCHARGE PLANNING
Met w/ patient & wife, Cynthia, at bedside to review DC planning in process. Patient will be returning to [place] after short time in [place]. Discussed working home therapy program until seen by PCP for referrals to OP therapy in [place]. Questions answered. Emotional support provided.

## 2020-10-02 NOTE — PROGRESS NOTES
"Rehab Progress Note     Encounter Date: 10/2/2020    CC: encephalopathy, decreased mobility    Interval Events (Subjective)  Patient sitting up in room. Wife has questions about his progress. Discussed that he is still set for next week. Discussed we are still working on where they can go for therapy as they are moving around a lot in October.  Reviewed with wife about hyponatremia which is currently stable on current regimen at 132. Reviewed hypokalemia and improving anemia.  She questions about his encephalopathy. Discussed most likely related to his electrolyte abnormalities. Denies pain. Denies confusion. He reports somewhat improving sleep but still needing second medication.    IDT Team Meeting 9/29/2020  DC/Disposition:  10/6/20    Objective:  VITAL SIGNS: /68   Pulse 68   Temp 36.4 °C (97.6 °F) (Tympanic)   Resp 18   Ht 1.702 m (5' 7\")   Wt 70.5 kg (155 lb 6.8 oz)   SpO2 95%   BMI 24.34 kg/m²   Gen: NAD  Psych: Mood and affect appropriate  CV: RRR, no edema  Resp: CTAB, no upper airway sounds  Abd: NTND  Neuro: AOx4, 5/5 BUE    Recent Results (from the past 72 hour(s))   CBC WITH DIFFERENTIAL    Collection Time: 09/30/20  6:58 AM   Result Value Ref Range    WBC 6.3 4.8 - 10.8 K/uL    RBC 3.12 (L) 4.70 - 6.10 M/uL    Hemoglobin 11.2 (L) 14.0 - 18.0 g/dL    Hematocrit 32.3 (L) 42.0 - 52.0 %    .5 (H) 81.4 - 97.8 fL    MCH 35.9 (H) 27.0 - 33.0 pg    MCHC 34.7 33.7 - 35.3 g/dL    RDW 48.0 35.9 - 50.0 fL    Platelet Count 432 164 - 446 K/uL    MPV 10.9 9.0 - 12.9 fL    Neutrophils-Polys 70.50 44.00 - 72.00 %    Lymphocytes 14.90 (L) 22.00 - 41.00 %    Monocytes 11.60 0.00 - 13.40 %    Eosinophils 1.90 0.00 - 6.90 %    Basophils 0.80 0.00 - 1.80 %    Immature Granulocytes 0.30 0.00 - 0.90 %    Nucleated RBC 0.00 /100 WBC    Neutrophils (Absolute) 4.44 1.82 - 7.42 K/uL    Lymphs (Absolute) 0.94 (L) 1.00 - 4.80 K/uL    Monos (Absolute) 0.73 0.00 - 0.85 K/uL    Eos (Absolute) 0.12 0.00 - 0.51 K/uL "    Baso (Absolute) 0.05 0.00 - 0.12 K/uL    Immature Granulocytes (abs) 0.02 0.00 - 0.11 K/uL    NRBC (Absolute) 0.00 K/uL   Basic Metabolic Panel    Collection Time: 09/30/20  6:58 AM   Result Value Ref Range    Sodium 132 (L) 135 - 145 mmol/L    Potassium 3.6 3.6 - 5.5 mmol/L    Chloride 94 (L) 96 - 112 mmol/L    Co2 25 20 - 33 mmol/L    Glucose 101 (H) 65 - 99 mg/dL    Bun 27 (H) 8 - 22 mg/dL    Creatinine 0.73 0.50 - 1.40 mg/dL    Calcium 9.2 8.5 - 10.5 mg/dL    Anion Gap 13.0 7.0 - 16.0   MAGNESIUM    Collection Time: 09/30/20  6:58 AM   Result Value Ref Range    Magnesium 1.4 (L) 1.5 - 2.5 mg/dL   ESTIMATED GFR    Collection Time: 09/30/20  6:58 AM   Result Value Ref Range    GFR If African American >60 >60 mL/min/1.73 m 2    GFR If Non African American >60 >60 mL/min/1.73 m 2   CBC WITH DIFFERENTIAL    Collection Time: 10/02/20  8:08 AM   Result Value Ref Range    WBC 6.2 4.8 - 10.8 K/uL    RBC 3.25 (L) 4.70 - 6.10 M/uL    Hemoglobin 11.5 (L) 14.0 - 18.0 g/dL    Hematocrit 34.0 (L) 42.0 - 52.0 %    .6 (H) 81.4 - 97.8 fL    MCH 35.4 (H) 27.0 - 33.0 pg    MCHC 33.8 33.7 - 35.3 g/dL    RDW 49.1 35.9 - 50.0 fL    Platelet Count 518 (H) 164 - 446 K/uL    MPV 11.0 9.0 - 12.9 fL    Neutrophils-Polys 73.80 (H) 44.00 - 72.00 %    Lymphocytes 14.90 (L) 22.00 - 41.00 %    Monocytes 7.60 0.00 - 13.40 %    Eosinophils 2.40 0.00 - 6.90 %    Basophils 0.80 0.00 - 1.80 %    Immature Granulocytes 0.50 0.00 - 0.90 %    Nucleated RBC 0.00 /100 WBC    Neutrophils (Absolute) 4.57 1.82 - 7.42 K/uL    Lymphs (Absolute) 0.92 (L) 1.00 - 4.80 K/uL    Monos (Absolute) 0.47 0.00 - 0.85 K/uL    Eos (Absolute) 0.15 0.00 - 0.51 K/uL    Baso (Absolute) 0.05 0.00 - 0.12 K/uL    Immature Granulocytes (abs) 0.03 0.00 - 0.11 K/uL    NRBC (Absolute) 0.00 K/uL   Basic Metabolic Panel    Collection Time: 10/02/20  8:08 AM   Result Value Ref Range    Sodium 132 (L) 135 - 145 mmol/L    Potassium 3.5 (L) 3.6 - 5.5 mmol/L    Chloride 94 (L) 96  - 112 mmol/L    Co2 25 20 - 33 mmol/L    Glucose 116 (H) 65 - 99 mg/dL    Bun 16 8 - 22 mg/dL    Creatinine 0.62 0.50 - 1.40 mg/dL    Calcium 9.5 8.5 - 10.5 mg/dL    Anion Gap 13.0 7.0 - 16.0   ESTIMATED GFR    Collection Time: 10/02/20  8:08 AM   Result Value Ref Range    GFR If African American >60 >60 mL/min/1.73 m 2    GFR If Non African American >60 >60 mL/min/1.73 m 2       Current Facility-Administered Medications   Medication Frequency   • amLODIPine (NORVASC) tablet 5 mg QHS   • melatonin tablet 3 mg QHS   • traZODone (DESYREL) tablet 50 mg QHS PRN   • artificial tears ophthalmic solution 1 Drop Q2HRS PRN   • vitamin D (cholecalciferol) tablet 4,000 Units DAILY   • senna-docusate (PERICOLACE or SENOKOT S) 8.6-50 MG per tablet 2 Tab BID    And   • polyethylene glycol/lytes (MIRALAX) PACKET 1 Packet QDAY PRN    And   • magnesium hydroxide (MILK OF MAGNESIA) suspension 30 mL QDAY PRN    And   • bisacodyl (DULCOLAX) suppository 10 mg QDAY PRN   • oxyCODONE immediate-release (ROXICODONE) tablet 2.5 mg Q3HRS PRN   • Respiratory Therapy Consult Continuous RT   • acetaminophen (TYLENOL) tablet 500 mg 4X/DAY   • apixaban (ELIQUIS) tablet 2.5 mg BID   • budesonide-formoterol (SYMBICORT) 160-4.5 MCG/ACT inhaler 2 Puff BID PRN   • ferrous sulfate tablet 325 mg Q48HRS   • lidocaine (LIDODERM) 5 % 1 Patch QDAY PRN   • montelukast (SINGULAIR) tablet 10 mg DAILY   • simvastatin (ZOCOR) tablet 20 mg Q EVENING   • sodium chloride (SALT) tablet 1 g TID WITH MEALS   • sotalol (BETAPACE) tablet 80 mg TWICE DAILY   • thiamine tablet 100 mg DAILY       Orders Placed This Encounter   Procedures   • Diet Order Regular (whole meds )     Standing Status:   Standing     Number of Occurrences:   1     Order Specific Question:   Diet:     Answer:   Regular [1]     Comments:   whole meds      Order Specific Question:   Nutrient modifications:     Answer:   High Protein [4]     Order Specific Question:   Electrolyte modifications:      Answer:   High Potassium [4]     Order Specific Question:   Texture Modifier     Answer:   Level 7 - Regular/Easy to Chew     Order Specific Question:   Liquid level     Answer:   Level 0 - Thin     Order Specific Question:   Consistency/Fluid modifications:     Answer:   1500 ml Fluid Restriction [9]       Assessment:  Active Hospital Problems    Diagnosis   • *Acute encephalopathy   • Hypokalemia   • Back pain   • Hypomagnesemia   • Paroxysmal atrial fibrillation (HCC)       Medical Decision Making and Plan:  Acute encephalopathy  - Etiology hypertensive vs metabolic   - No stroke found   - EEG with encephalopathy, no seizure captured.   - Admit to [facility]   - Evals by PT/OT and SLP on arrival depending on schedule   -Resolved confusion     Dypshagia  SLP to consult.  MBS on 9/28/20 - advanced to regular    Pain  - Tylenol , lidoderm patch  -Decrease to 500 mg TID     PAF   - on eliquis, sotalol, amlodipine, apixaban, hydralazine, simvastatin     Pulm  - montelukast      Hyponatremia/SIADH  - Na 127 on day of admission   - 1L fluid restriction -> 1.5 L as becoming volume depleted  - Salt tabs 1g TID, continue  - Appreciate Nephrology recommendations. Signed off, still on salt tabs with meals. Stable at 132 on 10/2/20. Continue 1.5L fluid restriction and salt tabs     Hypokalemia  - Corrected to 4.5 on day of admission   - Nephrology will continue to follow and titrate   - High protein and high K diet     Thrombocytosis  - Plt 582 on admission   - monitor      Hypertension/Hypotension  Patient on Amlodipine 10 mg, Sotalol 80 mg BID and Hydralazine 25 mg q8h  Discontinue Hydralazine. 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 FLC ratio 1.74  -Referral to Hematology     Anemia:   -Hgb 12.1 on admission   - improving, continue to monitor     Hypomagnesemia  Improved, will  continue to monitor    Insomnia  Start on Melatonin, if needed PRN Trazodone  Increase Melatonin to 6 mg    Vitamin D Deficiency:  26 on admission. Started on 4000 U    DVT Ppx   Patient on Eliquis    Total time:  26 minutes.  I spent greater than 50% of the time for patient care, counseling, and coordination on this date, including unit/floor time, and face-to-face time with the patient as per interval events and assessment and plan above. Topics discussed included reviewed SIADH with wife, reviewed encephalopathy, increase melatonin for sleep, and discontinue bowel medications.     Td Andrews M.D.

## 2020-10-02 NOTE — THERAPY
"Occupational Therapy  Daily Treatment     Patient Name: Jordan Alexander  Age:  75 y.o., Sex:  male  Medical Record #: [id number]  Today's Date: 10/2/2020     Precautions  Precautions: (P) Fall Risk  Comments: (P) memory deficits, orhtostatic hypotension         Subjective    \"This puzzle is for kids? This is hard.\" pt competed eye spy 35 piece puzzle      Objective       10/02/20 1501   Precautions   Precautions Fall Risk   Comments memory deficits, orhtostatic hypotension   Pain   Intervention Declines   Pain 0 - 10 Group   Pain Rating Scale (NPRS) 0   Cognition    Level of Consciousness Alert   Functional Level of Assist   Grooming Modified Independent   Grooming Description Seated in wheelchair at sink   Toileting Supervision   Toileting Description Grab bar;Increased time   Bed, Chair, Wheelchair Transfer Supervised   Bed Chair Wheelchair Transfer Description Increased time;Supervision for safety   Toilet Transfers Supervised   Toilet Transfer Description Grab bar;Increased time  (w/c <> toilet)   Bed Mobility    Sit to Supine Supervised   Scooting Supervised   Interdisciplinary Plan of Care Collaboration   Patient Position at End of Therapy In Bed;Call Light within Reach;Tray Table within Reach;Phone within Reach   OT Total Time Spent   OT Individual Total Time Spent (Mins) 45   OT Charge Group   OT Self Care / ADL 1   OT Therapy Activity 2       Assessment    Pt tolerated session well. Pt very pleasant and motivated for therapy, despite being tired from full day of therapies. Pt completed w/c mobility using BUE/BLE switching between the two w/ mod I and good isabel- 2 cues needed for path finding to gym and back to room. Pt engaged in standing bal/tolerance at raised table with functional reaching while engaging in cognitive activity- 35 piece large puzzle. Pt required mod cues throughout for  edge vs middle pieces and cues for identifying color matches for piece placement and orientation. Pt able to " stand for ~13 minutes total with 3 rest breaks needed throughout every 3-5 minutes. Pt c/o pain in back while in standing, no LOB noted during standing.     Strengths: Adequate strength, Independent prior level of function, Pleasant and cooperative, Willingly participates in therapeutic activities  Barriers: Confused, Decreased endurance, Impaired activity tolerance, Impaired balance, Impaired carryover of learning, Impaired insight/denial of deficits, Impaired functional cognition    Plan  monitor BP, ADLs/IADLs , related mobility and cognition  strength/endurance building ,balance activity     Occupational Therapy Goals     Problem: Dressing     Dates: Start: 09/29/20       Goal: STG-Within one week, patient will dress UB     Dates: Start: 09/29/20       Description: 1) Individualized Goal: with distant supervision   2) Interventions: OT Self Care/ADL, OT Cognitive Skill Dev, OT Neuro Re-Ed/Balance, OT Therapeutic Activity, OT Evaluation, and OT Therapeutic Exercise            Goal: STG-Within one week, patient will dress LB     Dates: Start: 09/29/20       Description: 1) Individualized Goal: with distant supervision   2) Interventions: OT Self Care/ADL, OT Cognitive Skill Dev, OT Neuro Re-Ed/Balance, OT Therapeutic Activity, OT Evaluation, and OT Therapeutic Exercise                Problem: OT Long Term Goals     Dates: Start: 09/26/20       Goal: LTG-By discharge, patient will complete basic self care tasks     Dates: Start: 09/26/20       Description:  1) Individualized Goal:  with mod I  2) Interventions:  OT Self Care/ADL, OT Cognitive Skill Dev, OT Neuro Re-Ed/Balance, OT Therapeutic Activity, OT Evaluation, and OT Therapeutic Exercise          Goal: LTG-By discharge, patient will perform bathroom transfers     Dates: Start: 09/26/20       Description:  1) Individualized Goal:  with mod I  2) Interventions:  OT Self Care/ADL, OT Cognitive Skill Dev, OT Neuro Re-Ed/Balance, OT Therapeutic Activity, OT  Evaluation, and OT Therapeutic Exercise

## 2020-10-03 PROCEDURE — A9270 NON-COVERED ITEM OR SERVICE: HCPCS | Performed by: NURSE PRACTITIONER

## 2020-10-03 PROCEDURE — 770010 HCHG ROOM/CARE - REHAB SEMI PRIVAT*

## 2020-10-03 PROCEDURE — 700102 HCHG RX REV CODE 250 W/ 637 OVERRIDE(OP): Performed by: PHYSICAL MEDICINE & REHABILITATION

## 2020-10-03 PROCEDURE — A9270 NON-COVERED ITEM OR SERVICE: HCPCS | Performed by: PHYSICAL MEDICINE & REHABILITATION

## 2020-10-03 PROCEDURE — 99232 SBSQ HOSP IP/OBS MODERATE 35: CPT | Performed by: PHYSICAL MEDICINE & REHABILITATION

## 2020-10-03 PROCEDURE — 700102 HCHG RX REV CODE 250 W/ 637 OVERRIDE(OP): Performed by: NURSE PRACTITIONER

## 2020-10-03 RX ADMIN — CHOLECALCIFEROL TAB 25 MCG (1000 UNIT) 4000 UNITS: 25 TAB at 07:52

## 2020-10-03 RX ADMIN — Medication 1 G: at 07:52

## 2020-10-03 RX ADMIN — SOTALOL HYDROCHLORIDE 80 MG: 80 TABLET ORAL at 06:04

## 2020-10-03 RX ADMIN — MONTELUKAST 10 MG: 10 TABLET, FILM COATED ORAL at 07:52

## 2020-10-03 RX ADMIN — AMLODIPINE BESYLATE 5 MG: 5 TABLET ORAL at 21:47

## 2020-10-03 RX ADMIN — POLYVINYL ALCOHOL 1 DROP: 14 SOLUTION/ DROPS OPHTHALMIC at 16:30

## 2020-10-03 RX ADMIN — SIMVASTATIN 20 MG: 20 TABLET, FILM COATED ORAL at 21:47

## 2020-10-03 RX ADMIN — ACETAMINOPHEN 500 MG: 500 TABLET, FILM COATED ORAL at 07:53

## 2020-10-03 RX ADMIN — Medication 1 G: at 17:18

## 2020-10-03 RX ADMIN — APIXABAN 2.5 MG: 5 TABLET, FILM COATED ORAL at 21:46

## 2020-10-03 RX ADMIN — MELATONIN 6 MG: at 21:48

## 2020-10-03 RX ADMIN — ACETAMINOPHEN 500 MG: 500 TABLET, FILM COATED ORAL at 21:46

## 2020-10-03 RX ADMIN — Medication 1 G: at 11:33

## 2020-10-03 RX ADMIN — FERROUS SULFATE TAB 325 MG (65 MG ELEMENTAL FE) 325 MG: 325 (65 FE) TAB at 07:53

## 2020-10-03 RX ADMIN — SOTALOL HYDROCHLORIDE 80 MG: 80 TABLET ORAL at 17:18

## 2020-10-03 RX ADMIN — THIAMINE HCL TAB 100 MG 100 MG: 100 TAB at 07:53

## 2020-10-03 RX ADMIN — ACETAMINOPHEN 500 MG: 500 TABLET, FILM COATED ORAL at 14:22

## 2020-10-03 RX ADMIN — APIXABAN 2.5 MG: 5 TABLET, FILM COATED ORAL at 07:53

## 2020-10-03 ASSESSMENT — PATIENT HEALTH QUESTIONNAIRE - PHQ9
1. LITTLE INTEREST OR PLEASURE IN DOING THINGS: NOT AT ALL
SUM OF ALL RESPONSES TO PHQ9 QUESTIONS 1 AND 2: 0
2. FEELING DOWN, DEPRESSED, IRRITABLE, OR HOPELESS: NOT AT ALL

## 2020-10-03 NOTE — CARE PLAN
Problem: Communication  Goal: The ability to communicate needs accurately and effectively will improve  Description: Patient able to verbalize needs.  Will continue to monitor.   Outcome: PROGRESSING AS EXPECTED  Note: Pt able to communicate well with staff. Able to get needs across effectively and is pleasant and cooperative.      Problem: Safety  Goal: Will remain free from injury  Description: Pt uses call light consistently and appropriately. Waits for assistance does not attempt self transfer this shift. Able to verbalize needs.   Outcome: PROGRESSING AS EXPECTED  Note: Utilizes call light for help transferring to bathroom. Demonstrates good safety awareness.   Goal: Will remain free from falls  Outcome: PROGRESSING AS EXPECTED     Problem: Venous Thromboembolism (VTW)/Deep Vein Thrombosis (DVT) Prevention:  Goal: Patient will participate in Venous Thrombosis (VTE)/Deep Vein Thrombosis (DVT)Prevention Measures  Outcome: PROGRESSING AS EXPECTED  Flowsheets (Taken 10/3/2020 0031)  Pharmacologic Prophylaxis Used: Apixaban     Problem: Bowel/Gastric:  Goal: Normal bowel function is maintained or improved  Outcome: PROGRESSING AS EXPECTED  Note: Continent and regular  Goal: Will not experience complications related to bowel motility  Outcome: PROGRESSING AS EXPECTED     Problem: Pain Management  Goal: Pain level will decrease to patient's comfort goal  Outcome: PROGRESSING AS EXPECTED  Note: Denies any pain

## 2020-10-03 NOTE — PROGRESS NOTES
"Rehab Progress Note     Encounter Date: 10/3/2020    CC: encephalopathy, decreased mobility    Interval Events (Subjective)  Patient sitting up in his bed.  He repots that he has been feeling good.  He slept well and feels like he is making good progress.  Looking forward to going home.    Denies shortness of breath, no chest pain, no abdominal pain.    IDT Team Meeting 9/29/2020  DC/Disposition:  10/6/20    Objective:  VITAL SIGNS: /68   Pulse 68   Temp 36.3 °C (97.3 °F) (Tympanic)   Resp 18   Ht 1.702 m (5' 7\")   Wt 70.5 kg (155 lb 6.8 oz)   SpO2 96%   BMI 24.34 kg/m²   Gen: No acute distress  Psych: Mood and affect appropriate  CV: RRR, no edema  Resp: CTAB, no upper airway sounds  Abd:positive bowel sounds, nontender, nondistended  Ext: no calf tenderness  Neuro: AOx4    Recent Results (from the past 72 hour(s))   CBC WITH DIFFERENTIAL    Collection Time: 10/02/20  8:08 AM   Result Value Ref Range    WBC 6.2 4.8 - 10.8 K/uL    RBC 3.25 (L) 4.70 - 6.10 M/uL    Hemoglobin 11.5 (L) 14.0 - 18.0 g/dL    Hematocrit 34.0 (L) 42.0 - 52.0 %    .6 (H) 81.4 - 97.8 fL    MCH 35.4 (H) 27.0 - 33.0 pg    MCHC 33.8 33.7 - 35.3 g/dL    RDW 49.1 35.9 - 50.0 fL    Platelet Count 518 (H) 164 - 446 K/uL    MPV 11.0 9.0 - 12.9 fL    Neutrophils-Polys 73.80 (H) 44.00 - 72.00 %    Lymphocytes 14.90 (L) 22.00 - 41.00 %    Monocytes 7.60 0.00 - 13.40 %    Eosinophils 2.40 0.00 - 6.90 %    Basophils 0.80 0.00 - 1.80 %    Immature Granulocytes 0.50 0.00 - 0.90 %    Nucleated RBC 0.00 /100 WBC    Neutrophils (Absolute) 4.57 1.82 - 7.42 K/uL    Lymphs (Absolute) 0.92 (L) 1.00 - 4.80 K/uL    Monos (Absolute) 0.47 0.00 - 0.85 K/uL    Eos (Absolute) 0.15 0.00 - 0.51 K/uL    Baso (Absolute) 0.05 0.00 - 0.12 K/uL    Immature Granulocytes (abs) 0.03 0.00 - 0.11 K/uL    NRBC (Absolute) 0.00 K/uL   Basic Metabolic Panel    Collection Time: 10/02/20  8:08 AM   Result Value Ref Range    Sodium 132 (L) 135 - 145 mmol/L    Potassium " 3.5 (L) 3.6 - 5.5 mmol/L    Chloride 94 (L) 96 - 112 mmol/L    Co2 25 20 - 33 mmol/L    Glucose 116 (H) 65 - 99 mg/dL    Bun 16 8 - 22 mg/dL    Creatinine 0.62 0.50 - 1.40 mg/dL    Calcium 9.5 8.5 - 10.5 mg/dL    Anion Gap 13.0 7.0 - 16.0   ESTIMATED GFR    Collection Time: 10/02/20  8:08 AM   Result Value Ref Range    GFR If African American >60 >60 mL/min/1.73 m 2    GFR If Non African American >60 >60 mL/min/1.73 m 2       Current Facility-Administered Medications   Medication Frequency   • acetaminophen (TYLENOL) tablet 500 mg TID   • senna-docusate (PERICOLACE or SENOKOT S) 8.6-50 MG per tablet 2 Tab BID PRN    And   • polyethylene glycol/lytes (MIRALAX) PACKET 1 Packet QDAY PRN    And   • magnesium hydroxide (MILK OF MAGNESIA) suspension 30 mL QDAY PRN    And   • bisacodyl (DULCOLAX) suppository 10 mg QDAY PRN   • melatonin tablet 6 mg QHS   • amLODIPine (NORVASC) tablet 5 mg QHS   • traZODone (DESYREL) tablet 50 mg QHS PRN   • artificial tears ophthalmic solution 1 Drop Q2HRS PRN   • vitamin D (cholecalciferol) tablet 4,000 Units DAILY   • oxyCODONE immediate-release (ROXICODONE) tablet 2.5 mg Q3HRS PRN   • Respiratory Therapy Consult Continuous RT   • apixaban (ELIQUIS) tablet 2.5 mg BID   • budesonide-formoterol (SYMBICORT) 160-4.5 MCG/ACT inhaler 2 Puff BID PRN   • ferrous sulfate tablet 325 mg Q48HRS   • lidocaine (LIDODERM) 5 % 1 Patch QDAY PRN   • montelukast (SINGULAIR) tablet 10 mg DAILY   • simvastatin (ZOCOR) tablet 20 mg Q EVENING   • sodium chloride (SALT) tablet 1 g TID WITH MEALS   • sotalol (BETAPACE) tablet 80 mg TWICE DAILY   • thiamine tablet 100 mg DAILY       Orders Placed This Encounter   Procedures   • Diet Order Regular (whole meds )     Standing Status:   Standing     Number of Occurrences:   1     Order Specific Question:   Diet:     Answer:   Regular [1]     Comments:   whole meds      Order Specific Question:   Nutrient modifications:     Answer:   High Protein [4]     Order  Specific Question:   Electrolyte modifications:     Answer:   High Potassium [4]     Order Specific Question:   Texture Modifier     Answer:   Level 7 - Regular/Easy to Chew     Order Specific Question:   Liquid level     Answer:   Level 0 - Thin     Order Specific Question:   Consistency/Fluid modifications:     Answer:   1500 ml Fluid Restriction [9]       Assessment:  Active Hospital Problems    Diagnosis   • *Acute encephalopathy   • Hypokalemia   • Back pain   • Hypomagnesemia   • Paroxysmal atrial fibrillation (HCC)       Medical Decision Making and Plan:  Acute encephalopathy  - Etiology hypertensive vs metabolic   - No stroke found   - EEG with encephalopathy, no seizure captured.   - Continue inpatient rehab program  -PT/OT/SLP  -Resolved confusion     Dypshagia  SLP to consult.  MBS on 9/28/20 - advanced to regular diet    Pain  - Tylenol , lidoderm patch  -Decrease to 500 mg TID     PAF   - on eliquis, sotalol, amlodipine, apixaban, hydralazine, simvastatin    Pulm  - montelukast      Hyponatremia/SIADH  - Na 127 on day of admission   - 1L fluid restriction -> 1.5 L as becoming volume depleted  - Salt tabs 1g TID, continue  - Appreciate Nephrology recommendations. Signed off, still on salt tabs with meals. Stable at 132 on 10/2/20. Continue 1.5L fluid restriction and salt tabs     Hypokalemia  - Corrected to 4.5 on day of admission   - Nephrology will continue to follow and titrate   - High protein and high K diet  - K+ 3.5 on 10/2/2020     Thrombocytosis  - Plt 582 on admission, 518 on 10/02/2020  - monitor      Hypertension/Hypotension  Patient on Amlodipine 10 mg, Sotalol 80 mg BID and Hydralazine 25 mg q8h  Discontinue Hydralazine. 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 FLC ratio 1.74  -Referral to Hematology     Anemia:   -Hgb 12.1 on admission, 11.5  on 10/2/2020   - continue to monitor     Hypomagnesemia  Improved, will continue to monitor    Insomnia  Start on Melatonin, if needed PRN Trazodone  Increase Melatonin to 6 mg    Vitamin D Deficiency:  26 on admission. Started on 4000 U    DVT Ppx   Patient on Eliqukennedy Garcia M.D.

## 2020-10-04 PROCEDURE — 97530 THERAPEUTIC ACTIVITIES: CPT

## 2020-10-04 PROCEDURE — 700102 HCHG RX REV CODE 250 W/ 637 OVERRIDE(OP): Performed by: PHYSICAL MEDICINE & REHABILITATION

## 2020-10-04 PROCEDURE — 99232 SBSQ HOSP IP/OBS MODERATE 35: CPT | Performed by: PHYSICAL MEDICINE & REHABILITATION

## 2020-10-04 PROCEDURE — A9270 NON-COVERED ITEM OR SERVICE: HCPCS | Performed by: PHYSICAL MEDICINE & REHABILITATION

## 2020-10-04 PROCEDURE — 97129 THER IVNTJ 1ST 15 MIN: CPT

## 2020-10-04 PROCEDURE — 770010 HCHG ROOM/CARE - REHAB SEMI PRIVAT*

## 2020-10-04 PROCEDURE — 97535 SELF CARE MNGMENT TRAINING: CPT

## 2020-10-04 PROCEDURE — 97110 THERAPEUTIC EXERCISES: CPT

## 2020-10-04 PROCEDURE — 97130 THER IVNTJ EA ADDL 15 MIN: CPT

## 2020-10-04 PROCEDURE — 700102 HCHG RX REV CODE 250 W/ 637 OVERRIDE(OP): Performed by: NURSE PRACTITIONER

## 2020-10-04 PROCEDURE — A9270 NON-COVERED ITEM OR SERVICE: HCPCS | Performed by: NURSE PRACTITIONER

## 2020-10-04 PROCEDURE — 97116 GAIT TRAINING THERAPY: CPT

## 2020-10-04 PROCEDURE — 97112 NEUROMUSCULAR REEDUCATION: CPT

## 2020-10-04 RX ADMIN — Medication 1 G: at 07:35

## 2020-10-04 RX ADMIN — ACETAMINOPHEN 500 MG: 500 TABLET, FILM COATED ORAL at 21:24

## 2020-10-04 RX ADMIN — MELATONIN 6 MG: at 21:24

## 2020-10-04 RX ADMIN — MONTELUKAST 10 MG: 10 TABLET, FILM COATED ORAL at 07:36

## 2020-10-04 RX ADMIN — ACETAMINOPHEN 500 MG: 500 TABLET, FILM COATED ORAL at 07:36

## 2020-10-04 RX ADMIN — ACETAMINOPHEN 500 MG: 500 TABLET, FILM COATED ORAL at 14:21

## 2020-10-04 RX ADMIN — SOTALOL HYDROCHLORIDE 80 MG: 80 TABLET ORAL at 05:45

## 2020-10-04 RX ADMIN — AMLODIPINE BESYLATE 5 MG: 5 TABLET ORAL at 21:24

## 2020-10-04 RX ADMIN — Medication 1 G: at 10:50

## 2020-10-04 RX ADMIN — CHOLECALCIFEROL TAB 25 MCG (1000 UNIT) 4000 UNITS: 25 TAB at 07:36

## 2020-10-04 RX ADMIN — Medication 1 G: at 17:09

## 2020-10-04 RX ADMIN — APIXABAN 2.5 MG: 5 TABLET, FILM COATED ORAL at 07:35

## 2020-10-04 RX ADMIN — SIMVASTATIN 20 MG: 20 TABLET, FILM COATED ORAL at 21:24

## 2020-10-04 RX ADMIN — APIXABAN 2.5 MG: 5 TABLET, FILM COATED ORAL at 21:24

## 2020-10-04 RX ADMIN — THIAMINE HCL TAB 100 MG 100 MG: 100 TAB at 07:36

## 2020-10-04 RX ADMIN — SOTALOL HYDROCHLORIDE 80 MG: 80 TABLET ORAL at 17:09

## 2020-10-04 ASSESSMENT — BALANCE ASSESSMENTS
STANDING UNSUPPORTED: 4
PICK UP OBJECT FROM THE FLOOR FROM A STANDING POSITION: 3
LONG VERSION TOTAL SCORE (MAX 56): 47
SITTING UNSUPPORTED: 4
TURN 360 DEGREES: 2
LOOK OVER LEFT AND RIGHT SHOULDERS WHILE STANDING: 4
SITTING TO STANDING: 4
STANDING TO SITTING: 4
STANDING UNSUPPORTED WITH FEET TOGETHER: 4
TRANSFERS: 4
REACHING FORWARD WITH OUTSTRETCHED ARM WHILE STANDING: 4
PLACE ALTERNATE FOOT ON STEP OR STOOL WHILE STANDING UNSUPPORTED: 2
STANDING UNSUPPORTED ONE FOOT IN FRONT: 3
LONG VERSION TOTAL SCORE (MAX 56): 47
STANDING ON ONE LEG: 1
STANDING UNSUPPORTED WITH EYES CLOSED: 4

## 2020-10-04 ASSESSMENT — GAIT ASSESSMENTS
DEVIATION: BRADYKINETIC
DISTANCE (FEET): 300
GAIT LEVEL OF ASSIST: STAND BY ASSIST

## 2020-10-04 ASSESSMENT — FIBROSIS 4 INDEX: FIB4 SCORE: 0.66

## 2020-10-04 NOTE — CARE PLAN
Problem: Communication  Goal: The ability to communicate needs accurately and effectively will improve  Description: Patient able to verbalize needs.  Will continue to monitor.   Outcome: PROGRESSING AS EXPECTED     Problem: Safety  Goal: Will remain free from injury  Description: Pt uses call light consistently and appropriately. Waits for assistance does not attempt self transfer this shift. Able to verbalize needs.   Outcome: PROGRESSING AS EXPECTED     Problem: Bowel/Gastric:  Goal: Normal bowel function is maintained or improved  Outcome: PROGRESSING AS EXPECTED     Problem: Respiratory:  Goal: Respiratory status will improve  Outcome: PROGRESSING AS EXPECTED     Problem: Pain Management  Goal: Pain level will decrease to patient's comfort goal  Outcome: PROGRESSING AS EXPECTED

## 2020-10-04 NOTE — THERAPY
Speech Language Pathology  Daily Treatment     Patient Name: Jordan Alexander  Age:  75 y.o., Sex:  male  Medical Record #: [id number]  Today's Date: 10/4/2020     Precautions  Precautions: Fall Risk  Comments: memory deficits, orhtostatic hypotension    Subjective    Pt was pleasant and cooperative. Pt reported fatigue from previous therapies.      Objective       10/04/20 1031   Strengths & Barriers   Strengths Able to follow instructions;Alert and oriented;Effective communication skills;Pleasant and cooperative;Willingly participates in therapeutic activities   SLP Total Time Spent   SLP Individual Total Time Spent (Mins) 60   Treatment Charges   SLP Cognitive Skill Development First 15 Minutes 1   SLP Cognitive Skill Development Additional 15 Minutes 3       Assessment    Pt requested to remain in his room. Pt completed various written exercises targeting sorting and sequencing. Pt categorized a list of 20 items into 4x4 grid according to size and location, with %100, I. Pt completed 4 step written sequencing exercise with %80 acc, I. Pt. Completed 5 step written sequencing activity with %80, Min A.  Pt was self aware during exercises and explained that his delays and errors were mainly due to his being tired from physical therapy.     Strengths: (P) Able to follow instructions, Alert and oriented, Effective communication skills, Pleasant and cooperative, Willingly participates in therapeutic activities  Barriers: Aspiration risk, Impaired functional cognition    Plan    Pt to continue with plan of care to target recall, safety, executive function and swallow function for safe discharge home.     Speech Therapy Problems     Problem: Memory STGs     Dates: Start: 09/26/20       Goal: STG-Within one week, patient will     Dates: Start: 09/26/20       Description: 1) Individualized goal:  Recall daily events, and safety strategies with use of external aid, 80% Jo-Ann.   2) Interventions:  SLP Cognitive Skill Development         Note:     Goal Note filed on 09/28/20 1537 by Aydin Foster MS,CCC-SLP    Continue to target                         Problem: Problem Solving STGs     Dates: Start: 09/26/20       Goal: STG-Within one week, patient will     Dates: Start: 09/27/20       Description: 1) Individualized goal:  Complete executive function tasks related to medication management/finance, with 80% accuracy, given min verbal cues.    2) Interventions:  SLP Cognitive Skill Development and SLP Group Treatment        Note:     Goal Note filed on 09/28/20 1537 by Aydin Foster MS,CCC-SLP    Continue to target                         Problem: Speech/Swallowing LTGs     Dates: Start: 09/26/20       Goal: LTG-By discharge, patient will safely swallow     Dates: Start: 09/26/20       Description: 1) Individualized goal:  regular textures, and thin liquids, without aspiration.   2) Interventions:  SLP Swallowing Dysfunction Treatment, SLP Oral Pharyngeal Evaluation, SLP Video Swallow Evaluation, and SLP Group Treatment              Goal: LTG-By discharge, patient will solve complex problem     Dates: Start: 09/26/20       Description: 1) Individualized goal:  Marco for safe discharge home.   2) Interventions:  SLP Aphasia Evaluation, SLP Cognitive Skill Development, and SLP Group Treatment

## 2020-10-04 NOTE — THERAPY
"Occupational Therapy  Daily Treatment     Patient Name: Jordan Alexander  Age:  75 y.o., Sex:  male  Medical Record #: [id number]  Today's Date: 10/4/2020     Precautions  Precautions: Fall Risk  Comments: impaired memory, orthostatic hypotension         Subjective    Pt declined ambulating room to family lounge - \"I'm already tired\"     Objective    Visual memory recall using Hexahedron blocks: recreating original shape - 60% accuracy with only 1 error if not correct  Abstract/ simple problem solving using Hexahedron blocks: 10 min to complete matching design       10/04/20 0831   Functional Level of Assist   Eating Independent   Sitting Lower Body Exercises   Nustep Resistance Level 3  (UE+LE propulsion 10 min, 0 RB, .26 mile)   Interdisciplinary Plan of Care Collaboration   Patient Position at End of Therapy Seated;Call Light within Reach;Tray Table within Reach;Self Releasing Lap Belt Applied   OT Total Time Spent   OT Individual Total Time Spent (Mins) 60   OT Charge Group   OT Self Care / ADL 1   OT Therapy Activity 2   OT Therapeutic Exercise  1       Assessment    Pt completed cognitive attention/memory task fairly well - requiring increase time to complete    Strengths: Adequate strength, Independent prior level of function, Pleasant and cooperative, Willingly participates in therapeutic activities  Barriers: Confused, Decreased endurance, Impaired activity tolerance, Impaired balance, Impaired carryover of learning, Impaired insight/denial of deficits, Impaired functional cognition    Plan    Refer to primary OT POC/goals    Occupational Therapy Goals     Problem: Dressing     Dates: Start: 09/29/20       Goal: STG-Within one week, patient will dress UB     Dates: Start: 09/29/20       Description: 1) Individualized Goal: with distant supervision   2) Interventions: OT Self Care/ADL, OT Cognitive Skill Dev, OT Neuro Re-Ed/Balance, OT Therapeutic Activity, OT Evaluation, and OT Therapeutic Exercise            " Goal: STG-Within one week, patient will dress LB     Dates: Start: 09/29/20       Description: 1) Individualized Goal: with distant supervision   2) Interventions: OT Self Care/ADL, OT Cognitive Skill Dev, OT Neuro Re-Ed/Balance, OT Therapeutic Activity, OT Evaluation, and OT Therapeutic Exercise                Problem: OT Long Term Goals     Dates: Start: 09/26/20       Goal: LTG-By discharge, patient will complete basic self care tasks     Dates: Start: 09/26/20       Description:  1) Individualized Goal:  with mod I  2) Interventions:  OT Self Care/ADL, OT Cognitive Skill Dev, OT Neuro Re-Ed/Balance, OT Therapeutic Activity, OT Evaluation, and OT Therapeutic Exercise          Goal: LTG-By discharge, patient will perform bathroom transfers     Dates: Start: 09/26/20       Description:  1) Individualized Goal:  with mod I  2) Interventions:  OT Self Care/ADL, OT Cognitive Skill Dev, OT Neuro Re-Ed/Balance, OT Therapeutic Activity, OT Evaluation, and OT Therapeutic Exercise

## 2020-10-04 NOTE — PROGRESS NOTES
"Rehab Progress Note     Encounter Date: 10/4/2020    CC: encephalopathy, decreased mobility    Interval Events (Subjective)  Patient seen sitting up in his wheelchair, his wife is at bedside.    He states that he has been doing well with therapies.  His PT was very difficult, he reports that he went up and stairs and walked.  He feels good about how he did and is looking forward to going home.  At bedside, he is working on speech homework.    Overnight, sleep was good.  No shortness of breath, no chest pain.      IDT Team Meeting 9/29/2020  DC/Disposition:  10/6/20    Objective:  VITAL SIGNS: /71   Pulse 68   Temp 36.5 °C (97.7 °F) (Temporal)   Resp 17   Ht 1.702 m (5' 7\")   Wt 70.5 kg (155 lb 6.8 oz)   SpO2 95%   BMI 24.34 kg/m²   Gen: Alert and cooperative, no acute distress  Psych: Mood and affect appropriate  CV: RRR, no edema  Resp: CTAB, no upper airway sounds  Abd: positive bowel sounds, nontender, nondistended  Ext: no calf tenderness  Neuro: AOx4    Recent Results (from the past 72 hour(s))   CBC WITH DIFFERENTIAL    Collection Time: 10/02/20  8:08 AM   Result Value Ref Range    WBC 6.2 4.8 - 10.8 K/uL    RBC 3.25 (L) 4.70 - 6.10 M/uL    Hemoglobin 11.5 (L) 14.0 - 18.0 g/dL    Hematocrit 34.0 (L) 42.0 - 52.0 %    .6 (H) 81.4 - 97.8 fL    MCH 35.4 (H) 27.0 - 33.0 pg    MCHC 33.8 33.7 - 35.3 g/dL    RDW 49.1 35.9 - 50.0 fL    Platelet Count 518 (H) 164 - 446 K/uL    MPV 11.0 9.0 - 12.9 fL    Neutrophils-Polys 73.80 (H) 44.00 - 72.00 %    Lymphocytes 14.90 (L) 22.00 - 41.00 %    Monocytes 7.60 0.00 - 13.40 %    Eosinophils 2.40 0.00 - 6.90 %    Basophils 0.80 0.00 - 1.80 %    Immature Granulocytes 0.50 0.00 - 0.90 %    Nucleated RBC 0.00 /100 WBC    Neutrophils (Absolute) 4.57 1.82 - 7.42 K/uL    Lymphs (Absolute) 0.92 (L) 1.00 - 4.80 K/uL    Monos (Absolute) 0.47 0.00 - 0.85 K/uL    Eos (Absolute) 0.15 0.00 - 0.51 K/uL    Baso (Absolute) 0.05 0.00 - 0.12 K/uL    Immature Granulocytes (abs) " 0.03 0.00 - 0.11 K/uL    NRBC (Absolute) 0.00 K/uL   Basic Metabolic Panel    Collection Time: 10/02/20  8:08 AM   Result Value Ref Range    Sodium 132 (L) 135 - 145 mmol/L    Potassium 3.5 (L) 3.6 - 5.5 mmol/L    Chloride 94 (L) 96 - 112 mmol/L    Co2 25 20 - 33 mmol/L    Glucose 116 (H) 65 - 99 mg/dL    Bun 16 8 - 22 mg/dL    Creatinine 0.62 0.50 - 1.40 mg/dL    Calcium 9.5 8.5 - 10.5 mg/dL    Anion Gap 13.0 7.0 - 16.0   ESTIMATED GFR    Collection Time: 10/02/20  8:08 AM   Result Value Ref Range    GFR If African American >60 >60 mL/min/1.73 m 2    GFR If Non African American >60 >60 mL/min/1.73 m 2       Current Facility-Administered Medications   Medication Frequency   • acetaminophen (TYLENOL) tablet 500 mg TID   • senna-docusate (PERICOLACE or SENOKOT S) 8.6-50 MG per tablet 2 Tab BID PRN    And   • polyethylene glycol/lytes (MIRALAX) PACKET 1 Packet QDAY PRN    And   • magnesium hydroxide (MILK OF MAGNESIA) suspension 30 mL QDAY PRN    And   • bisacodyl (DULCOLAX) suppository 10 mg QDAY PRN   • melatonin tablet 6 mg QHS   • amLODIPine (NORVASC) tablet 5 mg QHS   • traZODone (DESYREL) tablet 50 mg QHS PRN   • artificial tears ophthalmic solution 1 Drop Q2HRS PRN   • vitamin D (cholecalciferol) tablet 4,000 Units DAILY   • oxyCODONE immediate-release (ROXICODONE) tablet 2.5 mg Q3HRS PRN   • Respiratory Therapy Consult Continuous RT   • apixaban (ELIQUIS) tablet 2.5 mg BID   • budesonide-formoterol (SYMBICORT) 160-4.5 MCG/ACT inhaler 2 Puff BID PRN   • ferrous sulfate tablet 325 mg Q48HRS   • lidocaine (LIDODERM) 5 % 1 Patch QDAY PRN   • montelukast (SINGULAIR) tablet 10 mg DAILY   • simvastatin (ZOCOR) tablet 20 mg Q EVENING   • sodium chloride (SALT) tablet 1 g TID WITH MEALS   • sotalol (BETAPACE) tablet 80 mg TWICE DAILY   • thiamine tablet 100 mg DAILY       Orders Placed This Encounter   Procedures   • Diet Order Regular (whole meds )     Standing Status:   Standing     Number of Occurrences:   1      Order Specific Question:   Diet:     Answer:   Regular [1]     Comments:   whole meds      Order Specific Question:   Nutrient modifications:     Answer:   High Protein [4]     Order Specific Question:   Electrolyte modifications:     Answer:   High Potassium [4]     Order Specific Question:   Texture Modifier     Answer:   Level 7 - Regular/Easy to Chew     Order Specific Question:   Liquid level     Answer:   Level 0 - Thin     Order Specific Question:   Consistency/Fluid modifications:     Answer:   1500 ml Fluid Restriction [9]       Assessment:  Active Hospital Problems    Diagnosis   • *Acute encephalopathy   • Hypokalemia   • Back pain   • Hypomagnesemia   • Paroxysmal atrial fibrillation (HCC)       Medical Decision Making and Plan:  Acute encephalopathy  - Etiology hypertensive vs metabolic   - No stroke found   - EEG with encephalopathy, no seizure captured.   - Continue inpatient rehab program  -PT/OT/SLP  -Resolved confusion     Dypshagia  SLP to consult.  MBS on 9/28/20 - advanced to regular diet    Pain  - Tylenol , lidoderm patch  -Decrease to 500 mg TID     PAF   - on eliquis, sotalol, amlodipine, apixaban, hydralazine, simvastatin  - He and his wife have questions about making sure that Dr. YOLETTE Thurston in [place], his Cardiologist, get updates on the medication changes.  Visit scheduled for 10/21/2020    Pulm  - montelukast      Hyponatremia/SIADH  - Na 127 on day of admission   - 1L fluid restriction -> 1.5 L as becoming volume depleted  - Salt tabs 1g TID, continue  - Appreciate Nephrology recommendations. Signed off, still on salt tabs with meals. Stable at 132 on 10/2/20. Continue 1.5L fluid restriction and salt tabs     Hypokalemia  - Corrected to 4.5 on day of admission   - Nephrology will continue to follow and titrate   - High protein and high K diet  - K+ 3.5 on 10/2/2020     Thrombocytosis  - Plt 582 on admission, 518 on 10/02/2020  - monitor      Hypertension/Hypotension  Patient on  Amlodipine 10 mg, Sotalol 80 mg BID and Hydralazine 25 mg q8h  Discontinue Hydralazine. Ongoing low SBP, decrease Amlodipine to 5 mg  He has follow-up with his Cardiologist, Dr. 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 FLC ratio 1.74  -Referral to Hematology     Anemia:   -Hgb 12.1 on admission, 11.5 on 10/2/2020   - continue to monitor     Hypomagnesemia  Improved, will continue to monitor    Insomnia  Start on Melatonin, if needed PRN Trazodone  Increase Melatonin to 6 mg    Vitamin D Deficiency:  26 on admission. Started on 4000 U    DVT Ppx   Patient on Nohemi Garcia M.D.

## 2020-10-04 NOTE — THERAPY
Physical Therapy   Daily Treatment     Patient Name: Jordan Alexander  Age:  75 y.o., Sex:  male  Medical Record #: [id number]  Today's Date: 10/4/2020     Precautions  Precautions: (P) Fall Risk  Comments: (P) impaired memory, orthostatic hypotension    Subjective    Pt received in room in , agreeable to session earlier than scheduled. He remembered his schedule without observing it during conversation.     Objective       10/04/20 0931   Precautions   Precautions Fall Risk   Comments impaired memory, orthostatic hypotension   Vitals   Pulse 71   Patient BP Position Sitting   Blood Pressure  124/65   Cognition    Level of Consciousness Alert   Ability To Follow Commands 1 Step   Sleep/Wake Cycle   Sleep & Rest Awake   Gait Functional Level of Assist    Gait Level Of Assist Stand by Assist  (cues for stops, changes in speed, turns)   Assistive Device None   Distance (Feet) 300   # of Times Distance was Traveled   (x300' and x120')   Deviation Bradykinetic   Stairs Functional Level of Assist   Level of Assist with Stairs Stand by Assist   # of Stairs Climbed   (2x4 std ht steps and 2x6 adaptive steps, and 3x4 std ht c HR)   Stairs Description Hand rails;Extra time;Supervision for safety   Sitting Lower Body Exercises   Sit to Stand 1 set of 10  (s UE support)   Bed Mobility    Sit to Stand Modified Independent   Jimenez Balance Scale   Sitting Unsupported (Score 0-4) 4   Change Of Positon: Sitting To Standing (Score 0-4) 4   Change Of Positon: Standing To Sitting (Score 0-4) 4   Transfers (Score 0-4) 4   Standing Unsupported (Score 0-4) 4   Standing With Eyes Closed (Score 0-4) 4   Standing With Feet Together (Score 0-4) 4   Tandem Standing (Score 0-4) 3   Standing On One Leg (Score 0-4) 1   Turning Trunk (Feet Fixed) (Score 0-4) 4   Retrieving Objects From Floor (Score 0-4) 3   Turning 360 Degrees (Score 0-4) 2   Stool Stepping (Score 0-4) 2  (SPV)   Reaching Forward While Standing (Score 0-4) 4   Jimenez Balance Total Score  (0-56) 47   FGA (Functional Gait Assessment)   Gait Level Surface 2   Change in Gait Speed 2   Gait with Horizontal Head Turns 2   Gait with Vertical Head Turns 3   Gait and Pivot Turns 2   Step Over Obstacle 2   Gait with Narrow Base of Support 0  (unable to walk tandem)   Gait with Eyes Closed 2   Ambulating Backwards 2   Steps 2   Functional Gait Score 19   Interdisciplinary Plan of Care Collaboration   IDT Collaboration with  Speech Therapist   Patient Position at End of Therapy Seated;Chair Alarm On;Self Releasing Lap Belt Applied;Call Light within Reach;Tray Table within Reach;Phone within Reach;Other (Comments)   Collaboration Comments transfer of care to SLP in room   Strengths & Barriers   Strengths Adequate strength;Effective communication skills;Making steady progress towards goals;Motivated for self care and independence;Pleasant and cooperative;Supportive family;Willingly participates in therapeutic activities   Barriers Decreased endurance   PT Total Time Spent   PT Individual Total Time Spent (Mins) 60   PT Charge Group   PT Gait Training 2   PT Neuromuscular Re-Education / Balance 3   PT Therapeutic Activities 2     Note-billing is as follows: Gait training x1, Neuromuscular re-ed, x2, Therapeutic activities x1 (flowsheet corrected)    Assessment    Pt scored 47/56 on the Jimenez and 19/30 on the FGA. Overall his gait pattern is characterized by decreased isabel and decreased speed, with mild lateral instability noted, no overt LOB. He fatigues during session requiring seated rest breaks. Very minimal reports of dizziness during session, and no reports of LBP during session.    Strengths: (P) Adequate strength, Effective communication skills, Making steady progress towards goals, Motivated for self care and independence, Pleasant and cooperative, Supportive family, Willingly participates in therapeutic activities  Barriers: (P) Decreased endurance    Plan    General mobility/ strength/ endurance,  monitor for low back pain and orthostatic hypotension symptoms, balance training, core stability exercises for low back pain management.     Physical Therapy Problems     Problem: Balance     Dates: Start: 09/26/20       Goal: STG-Within one week, patient will     Dates: Start: 09/26/20       Description: 1) Individualized goal:  tolerate Jimenez Balance Scale  2) Interventions:  PT Group Therapy, PT Gait Training, PT Therapeutic Exercises, PT Neuro Re-Ed/Balance, PT Therapeutic Activity, and PT Evaluation      Note:     Goal Note filed on 09/29/20 1107 by Jocelyn Fernandez, PT    To be completed this afternoon                        Problem: Mobility     Dates: Start: 09/26/20       Goal: STG-Within one week, patient will ambulate community distances     Dates: Start: 09/26/20       Description: 1) Individualized goal: 150 ft with no AD and supervision.  2) Interventions:  PT Group Therapy, PT Gait Training, PT Therapeutic Exercises, PT Neuro Re-Ed/Balance, PT Therapeutic Activity, and PT Evaluation    Note:     Goal Note filed on 09/29/20 1107 by Jocelyn Fernandez, PT    CGA no AD                  Goal: STG-Within one week, patient will ambulate up/down a curb     Dates: Start: 09/26/20       Description: 1) Individualized goal: with BUE support and CGA.  2) Interventions:  PT Group Therapy, PT Gait Training, PT Therapeutic Exercises, PT Neuro Re-Ed/Balance, PT Therapeutic Activity, and PT Evaluation    Note:     Goal Note filed on 09/29/20 1107 by Jocelyn Fernandez, PT    To be assessed                         Problem: Mobility Transfers     Dates: Start: 09/26/20       Goal: STG-Within one week, patient will perform bed mobility     Dates: Start: 09/26/20       Description: 1) Individualized goal: independently.  2) Interventions:  PT Group Therapy, PT Gait Training, PT Therapeutic Exercises, PT Neuro Re-Ed/Balance, PT Therapeutic Activity, and PT Evaluation    Note:     Goal Note filed on 09/29/20 1107 by Jocelyn Fernandez, PT    To be  assessed                   Goal: STG-Within one week, patient will transfer bed to chair     Dates: Start: 09/26/20       Description: 1) Individualized goal: with no AD and supervision.  2) Interventions:  PT Group Therapy, PT Gait Training, PT Therapeutic Exercises, PT Neuro Re-Ed/Balance, PT Therapeutic Activity, and PT Evaluation    Note:     Goal Note filed on 09/29/20 1107 by Jocelyn Fernandez, PT    To be assessed                         Problem: PT-Long Term Goals     Dates: Start: 09/26/20       Goal: LTG-By discharge, patient will ambulate     Dates: Start: 09/26/20       Description: 1) Individualized goal: 200 ft with no AD mod I.  2) Interventions:  PT Group Therapy, PT Gait Training, PT Therapeutic Exercises, PT Neuro Re-Ed/Balance, PT Therapeutic Activity, and PT Evaluation          Goal: LTG-By discharge, patient will transfer one surface to another     Dates: Start: 09/26/20       Description: 1) Individualized goal: with no AD mod I.  2) Interventions:  PT Group Therapy, PT Gait Training, PT Therapeutic Exercises, PT Neuro Re-Ed/Balance, PT Therapeutic Activity, and PT Evaluation          Goal: LTG-By discharge, patient will perform home exercise program     Dates: Start: 09/26/20       Description: 1) Individualized goal: with handout mod I.  2) Interventions:  PT Group Therapy, PT Gait Training, PT Therapeutic Exercises, PT Neuro Re-Ed/Balance, PT Therapeutic Activity, and PT Evaluation          Goal: LTG-By discharge, patient will transfer in/out of a car     Dates: Start: 09/26/20       Description: 1) Individualized goal: with no AD and supervision.  2) Interventions:  PT Group Therapy, PT Gait Training, PT Therapeutic Exercises, PT Neuro Re-Ed/Balance, PT Therapeutic Activity, and PT Evaluation          Goal: LTG-By discharge, patient will     Dates: Start: 09/26/20       Description: 1) Individualized goal: improve Jimenez Balance Scale by >= 5 points to achieve MDC.  2) Interventions:  PT Group  Therapy, PT Gait Training, PT Therapeutic Exercises, PT Neuro Re-Ed/Balance, PT Therapeutic Activity, and PT Evaluation          Goal: LTG-By discharge, patient will     Dates: Start: 09/26/20       Description: 1) Individualized goal: negotiate 1-2 steps with BUE support and supervision.  2) Interventions:  PT Group Therapy, PT Gait Training, PT Therapeutic Exercises, PT Neuro Re-Ed/Balance, PT Therapeutic Activity, and PT Evaluation

## 2020-10-05 PROBLEM — G93.40 ACUTE ENCEPHALOPATHY: Status: RESOLVED | Noted: 2020-09-12 | Resolved: 2020-10-05

## 2020-10-05 PROCEDURE — 700102 HCHG RX REV CODE 250 W/ 637 OVERRIDE(OP): Performed by: PHYSICAL MEDICINE & REHABILITATION

## 2020-10-05 PROCEDURE — A9270 NON-COVERED ITEM OR SERVICE: HCPCS | Performed by: NURSE PRACTITIONER

## 2020-10-05 PROCEDURE — 97110 THERAPEUTIC EXERCISES: CPT

## 2020-10-05 PROCEDURE — 700102 HCHG RX REV CODE 250 W/ 637 OVERRIDE(OP): Performed by: NURSE PRACTITIONER

## 2020-10-05 PROCEDURE — 770010 HCHG ROOM/CARE - REHAB SEMI PRIVAT*

## 2020-10-05 PROCEDURE — 99232 SBSQ HOSP IP/OBS MODERATE 35: CPT | Performed by: PHYSICAL MEDICINE & REHABILITATION

## 2020-10-05 PROCEDURE — 97130 THER IVNTJ EA ADDL 15 MIN: CPT

## 2020-10-05 PROCEDURE — 97535 SELF CARE MNGMENT TRAINING: CPT

## 2020-10-05 PROCEDURE — A9270 NON-COVERED ITEM OR SERVICE: HCPCS | Performed by: PHYSICAL MEDICINE & REHABILITATION

## 2020-10-05 PROCEDURE — 97530 THERAPEUTIC ACTIVITIES: CPT

## 2020-10-05 PROCEDURE — 97129 THER IVNTJ 1ST 15 MIN: CPT

## 2020-10-05 RX ORDER — AMLODIPINE BESYLATE 5 MG/1
10 TABLET ORAL
Status: DISCONTINUED | OUTPATIENT
Start: 2020-10-05 | End: 2020-10-06 | Stop reason: HOSPADM

## 2020-10-05 RX ORDER — MONTELUKAST SODIUM 10 MG/1
10 TABLET ORAL DAILY
Qty: 30 TAB | Refills: 2 | Status: SHIPPED | OUTPATIENT
Start: 2020-10-05

## 2020-10-05 RX ORDER — BUDESONIDE AND FORMOTEROL FUMARATE DIHYDRATE 160; 4.5 UG/1; UG/1
2 AEROSOL RESPIRATORY (INHALATION) 2 TIMES DAILY PRN
Qty: 1 EACH | Refills: 2 | Status: SHIPPED | OUTPATIENT
Start: 2020-10-05

## 2020-10-05 RX ORDER — FERROUS SULFATE 325(65) MG
325 TABLET ORAL
Qty: 30 TAB | Refills: 0 | Status: SHIPPED | OUTPATIENT
Start: 2020-10-05

## 2020-10-05 RX ORDER — SOTALOL HYDROCHLORIDE 80 MG/1
80 TABLET ORAL 2 TIMES DAILY
Qty: 60 TAB | Refills: 2 | Status: SHIPPED | OUTPATIENT
Start: 2020-10-05

## 2020-10-05 RX ORDER — SIMVASTATIN 20 MG
20 TABLET ORAL DAILY
Qty: 30 TAB | Refills: 11 | Status: SHIPPED | OUTPATIENT
Start: 2020-10-05

## 2020-10-05 RX ORDER — SODIUM CHLORIDE 1 G/1
1 TABLET ORAL
Qty: 90 TAB | Refills: 1 | Status: SHIPPED | OUTPATIENT
Start: 2020-10-05

## 2020-10-05 RX ORDER — AMLODIPINE BESYLATE 10 MG/1
10 TABLET ORAL
Qty: 30 TAB | Refills: 2 | Status: SHIPPED | OUTPATIENT
Start: 2020-10-05

## 2020-10-05 RX ADMIN — ACETAMINOPHEN 500 MG: 500 TABLET, FILM COATED ORAL at 21:22

## 2020-10-05 RX ADMIN — THIAMINE HCL TAB 100 MG 100 MG: 100 TAB at 08:56

## 2020-10-05 RX ADMIN — MELATONIN 6 MG: at 21:21

## 2020-10-05 RX ADMIN — SOTALOL HYDROCHLORIDE 80 MG: 80 TABLET ORAL at 17:05

## 2020-10-05 RX ADMIN — AMLODIPINE BESYLATE 10 MG: 5 TABLET ORAL at 21:22

## 2020-10-05 RX ADMIN — Medication 1 G: at 11:18

## 2020-10-05 RX ADMIN — SOTALOL HYDROCHLORIDE 80 MG: 80 TABLET ORAL at 06:11

## 2020-10-05 RX ADMIN — Medication 1 G: at 08:56

## 2020-10-05 RX ADMIN — FERROUS SULFATE TAB 325 MG (65 MG ELEMENTAL FE) 325 MG: 325 (65 FE) TAB at 08:57

## 2020-10-05 RX ADMIN — MONTELUKAST 10 MG: 10 TABLET, FILM COATED ORAL at 08:55

## 2020-10-05 RX ADMIN — ACETAMINOPHEN 500 MG: 500 TABLET, FILM COATED ORAL at 15:30

## 2020-10-05 RX ADMIN — APIXABAN 2.5 MG: 5 TABLET, FILM COATED ORAL at 08:57

## 2020-10-05 RX ADMIN — CHOLECALCIFEROL TAB 25 MCG (1000 UNIT) 4000 UNITS: 25 TAB at 08:56

## 2020-10-05 RX ADMIN — APIXABAN 2.5 MG: 5 TABLET, FILM COATED ORAL at 21:21

## 2020-10-05 RX ADMIN — SIMVASTATIN 20 MG: 20 TABLET, FILM COATED ORAL at 21:21

## 2020-10-05 RX ADMIN — Medication 1 G: at 17:05

## 2020-10-05 ASSESSMENT — ACTIVITIES OF DAILY LIVING (ADL)
TOILETING_LEVEL_OF_ASSIST: ABLE TO COMPLETE TOILETING WITHOUT ASSIST
TOILETING_LEVEL_OF_ASSIST_DESCRIPTION: GRAB BAR;INCREASED TIME
BED_CHAIR_WHEELCHAIR_TRANSFER_DESCRIPTION: INCREASED TIME
SHOWER_TRANSFER_LEVEL_OF_ASSIST: REQUIRES SUPERVISION WITH SHOWER TRANSFER
TOILET_TRANSFER_DESCRIPTION: GRAB BAR;VERBAL CUEING
TOILET_TRANSFER_LEVEL_OF_ASSIST: REQUIRES SUPERVISION WITH TOILET TRANSFER

## 2020-10-05 ASSESSMENT — PATIENT HEALTH QUESTIONNAIRE - PHQ9
2. FEELING DOWN, DEPRESSED, IRRITABLE, OR HOPELESS: NOT AT ALL
1. LITTLE INTEREST OR PLEASURE IN DOING THINGS: NOT AT ALL
2. FEELING DOWN, DEPRESSED, IRRITABLE, OR HOPELESS: NOT AT ALL
SUM OF ALL RESPONSES TO PHQ9 QUESTIONS 1 AND 2: 0
SUM OF ALL RESPONSES TO PHQ9 QUESTIONS 1 AND 2: 0
1. LITTLE INTEREST OR PLEASURE IN DOING THINGS: NOT AT ALL

## 2020-10-05 ASSESSMENT — GAIT ASSESSMENTS
DISTANCE (FEET): 250
GAIT LEVEL OF ASSIST: SUPERVISED
DEVIATION: BRADYKINETIC

## 2020-10-05 ASSESSMENT — PAIN DESCRIPTION - PAIN TYPE: TYPE: ACUTE PAIN

## 2020-10-05 NOTE — THERAPY
"Physical Therapy   Daily Treatment     Patient Name: Jordan Alexander  Age:  75 y.o., Sex:  male  Medical Record #: [id number]  Today's Date: 10/5/2020     Precautions  Precautions: Fall Risk  Comments: impaired memory, orthostatic hypotension    Subjective    Pt seated in room, agreeable to PT.     Objective       10/05/20 0901   Precautions   Precautions Fall Risk   Comments impaired memory, orthostatic hypotension   Gait Functional Level of Assist    Gait Level Of Assist Supervised   Assistive Device None   Distance (Feet) 250   Deviation Bradykinetic   Wheelchair Functional Level of Assist   Wheelchair Assist Supervised   Distance Wheelchair (Feet or Distance) 150   Wheelchair Description Extra time;Verbal cueing   Stairs Functional Level of Assist   Level of Assist with Stairs Supervised   # of Stairs Climbed 8  (6\")   Stairs Description Hand rails;Extra time;Supervision for safety   Transfer Functional Level of Assist   Bed, Chair, Wheelchair Transfer Independent   Bed Chair Wheelchair Transfer Description Increased time   Sitting Lower Body Exercises   Ankle Pumps 1 set of 15   Hip Abduction 1 set of 15   Hip Adduction 1 set of 15   Long Arc Quad 1 set of 15   Marching 1 set of 15   Hamstring Curl 1 set of 15   Comments Above exercises completed seated in WC with green tband for resistance, hand-out issued for HEP    Interdisciplinary Plan of Care Collaboration   IDT Collaboration with  Certified Nursing Assistant;Occupational Therapist   Patient Position at End of Therapy Seated;Call Light within Reach;Tray Table within Reach   Collaboration Comments re: Mod I in room    PT Total Time Spent   PT Individual Total Time Spent (Mins) 60   PT Charge Group   PT Therapeutic Exercise 1   PT Therapeutic Activities 3     Pt completed car transfer, uneven surface ambulation and retrieving object from floor all with spv.    Pt issued fall education information packet and briefly reviewed with PT. Pt then performed floor " recovery, WC > supine on floor > seated EOM with spv and no AD, verbal cuing for technique.     Pt issued hand-outs for both supine and seated LE there-ex and completed seated exercises this session.     Assessment    Pt demonstrates readiness to transition home tomorrow with assist from supportive spouse.     Strengths: Adequate strength, Effective communication skills, Making steady progress towards goals, Motivated for self care and independence, Pleasant and cooperative, Supportive family, Willingly participates in therapeutic activities  Barriers: Decreased endurance    Plan    DC home tomorrow.    Physical Therapy Problems     Problem: Balance     Dates: Start: 09/26/20       Goal: STG-Within one week, patient will     Dates: Start: 09/26/20       Description: 1) Individualized goal:  tolerate Jimenez Balance Scale  2) Interventions:  PT Group Therapy, PT Gait Training, PT Therapeutic Exercises, PT Neuro Re-Ed/Balance, PT Therapeutic Activity, and PT Evaluation      Note:     Goal Note filed on 09/29/20 1107 by Jocelyn Fernandez, PT    To be completed this afternoon                        Problem: Mobility     Dates: Start: 09/26/20       Goal: STG-Within one week, patient will ambulate community distances     Dates: Start: 09/26/20       Description: 1) Individualized goal: 150 ft with no AD and supervision.  2) Interventions:  PT Group Therapy, PT Gait Training, PT Therapeutic Exercises, PT Neuro Re-Ed/Balance, PT Therapeutic Activity, and PT Evaluation    Note:     Goal Note filed on 09/29/20 1107 by Jocelyn Fernandez, PT    CGA no AD                  Goal: STG-Within one week, patient will ambulate up/down a curb     Dates: Start: 09/26/20       Description: 1) Individualized goal: with BUE support and CGA.  2) Interventions:  PT Group Therapy, PT Gait Training, PT Therapeutic Exercises, PT Neuro Re-Ed/Balance, PT Therapeutic Activity, and PT Evaluation    Note:     Goal Note filed on 09/29/20 1107 by Jocelyn Fernandez, PT    To  be assessed                         Problem: Mobility Transfers     Dates: Start: 09/26/20       Goal: STG-Within one week, patient will perform bed mobility     Dates: Start: 09/26/20       Description: 1) Individualized goal: independently.  2) Interventions:  PT Group Therapy, PT Gait Training, PT Therapeutic Exercises, PT Neuro Re-Ed/Balance, PT Therapeutic Activity, and PT Evaluation    Note:     Goal Note filed on 09/29/20 1107 by Jocelyn Fernandez, PT    To be assessed                   Goal: STG-Within one week, patient will transfer bed to chair     Dates: Start: 09/26/20       Description: 1) Individualized goal: with no AD and supervision.  2) Interventions:  PT Group Therapy, PT Gait Training, PT Therapeutic Exercises, PT Neuro Re-Ed/Balance, PT Therapeutic Activity, and PT Evaluation    Note:     Goal Note filed on 09/29/20 1107 by Jocelyn Fernandez, PT    To be assessed                         Problem: PT-Long Term Goals     Dates: Start: 09/26/20       Goal: LTG-By discharge, patient will ambulate     Dates: Start: 09/26/20       Description: 1) Individualized goal: 200 ft with no AD mod I.  2) Interventions:  PT Group Therapy, PT Gait Training, PT Therapeutic Exercises, PT Neuro Re-Ed/Balance, PT Therapeutic Activity, and PT Evaluation          Goal: LTG-By discharge, patient will transfer one surface to another     Dates: Start: 09/26/20       Description: 1) Individualized goal: with no AD mod I.  2) Interventions:  PT Group Therapy, PT Gait Training, PT Therapeutic Exercises, PT Neuro Re-Ed/Balance, PT Therapeutic Activity, and PT Evaluation          Goal: LTG-By discharge, patient will perform home exercise program     Dates: Start: 09/26/20       Description: 1) Individualized goal: with handout mod I.  2) Interventions:  PT Group Therapy, PT Gait Training, PT Therapeutic Exercises, PT Neuro Re-Ed/Balance, PT Therapeutic Activity, and PT Evaluation          Goal: LTG-By discharge, patient will transfer in/out  of a car     Dates: Start: 09/26/20       Description: 1) Individualized goal: with no AD and supervision.  2) Interventions:  PT Group Therapy, PT Gait Training, PT Therapeutic Exercises, PT Neuro Re-Ed/Balance, PT Therapeutic Activity, and PT Evaluation          Goal: LTG-By discharge, patient will     Dates: Start: 09/26/20       Description: 1) Individualized goal: improve Jimenez Balance Scale by >= 5 points to achieve MDC.  2) Interventions:  PT Group Therapy, PT Gait Training, PT Therapeutic Exercises, PT Neuro Re-Ed/Balance, PT Therapeutic Activity, and PT Evaluation          Goal: LTG-By discharge, patient will     Dates: Start: 09/26/20       Description: 1) Individualized goal: negotiate 1-2 steps with BUE support and supervision.  2) Interventions:  PT Group Therapy, PT Gait Training, PT Therapeutic Exercises, PT Neuro Re-Ed/Balance, PT Therapeutic Activity, and PT Evaluation

## 2020-10-05 NOTE — THERAPY
Speech Language Pathology  Daily Treatment     Patient Name: Jordan Alexander  Age:  75 y.o., Sex:  male  Medical Record #: [id number]  Today's Date: 10/5/2020     Precautions  Precautions: Fall Risk  Comments: impaired memory, orthostatic hypotension    Subjective    Pt was pleasant and cooperative during this ST session      Objective       10/05/20 1401   SCCAN (Scales of Cognitive and Communicative Ability for Neurorehabilitation)   Oral Expression - Raw Score 18   Oral Expression - Scale Performance Score 95   Orientation - Raw Score 12   Orientation - Scale Performance Score 100   Memory - Raw Score 12   Memory - Scale Performance Score 63   Speech Comprehension - Raw Score 12   Speech Comprehension - Scale Performance Score 92   Reading Comprehension - Raw Score 11   Reading Comprehension - Scale Performance Score 92   Writing - Raw Score 7   Writing - Scale Performance Score 100   Attention - Raw Score 14   Attention - Scale Performance Score 88   Problem Solving - Raw Score 22   Problem Solving - Scale Performance Score 96   SCCAN Total Raw Score 84   SCCAN Degree of Severity Mild Impairment   SLP Total Time Spent   SLP Individual Total Time Spent (Mins) 60   Treatment Charges   SLP Cognitive Skill Development First 15 Minutes 1   SLP Cognitive Skill Development Additional 15 Minutes 3       Assessment    Completed pt's outcome assessment using the SCCAN, pt achieved an overall score of 84/94 indicating mild cognitive deficits (increase from initial score of 76/94) with primary deficit in the area of memory (63%).  Pt demonstrated improvements in all areas as compared to his initial assessment.      Strengths: Able to follow instructions, Alert and oriented, Effective communication skills, Pleasant and cooperative, Willingly participates in therapeutic activities  Barriers: Aspiration risk, Impaired functional cognition    Plan    Pt is discharging home tomorrow     Speech Therapy Problems     Problem: Memory STGs      Dates: Start: 09/26/20       Goal: STG-Within one week, patient will     Dates: Start: 09/26/20       Description: 1) Individualized goal:  Recall daily events, and safety strategies with use of external aid, 80% Jo-Ann.   2) Interventions:  SLP Cognitive Skill Development        Note:     Goal Note filed on 09/28/20 1537 by Aydin Foster MS,CCC-SLP    Continue to target                         Problem: Problem Solving STGs     Dates: Start: 09/26/20       Goal: STG-Within one week, patient will     Dates: Start: 09/27/20       Description: 1) Individualized goal:  Complete executive function tasks related to medication management/finance, with 80% accuracy, given min verbal cues.    2) Interventions:  SLP Cognitive Skill Development and SLP Group Treatment        Note:     Goal Note filed on 09/28/20 1537 by Aydin Foster MS,CCC-SLP    Continue to target                         Problem: Speech/Swallowing LTGs     Dates: Start: 09/26/20       Goal: LTG-By discharge, patient will safely swallow     Dates: Start: 09/26/20       Description: 1) Individualized goal:  regular textures, and thin liquids, without aspiration.   2) Interventions:  SLP Swallowing Dysfunction Treatment, SLP Oral Pharyngeal Evaluation, SLP Video Swallow Evaluation, and SLP Group Treatment              Goal: LTG-By discharge, patient will solve complex problem     Dates: Start: 09/26/20       Description: 1) Individualized goal:  Marco for safe discharge home.   2) Interventions:  SLP Aphasia Evaluation, SLP Cognitive Skill Development, and SLP Group Treatment

## 2020-10-05 NOTE — CARE PLAN
Problem: Safety  Goal: Will remain free from injury  Description: Pt uses call light consistently and appropriately. Waits for assistance does not attempt self transfer this shift. Able to verbalize needs.   Outcome: PROGRESSING AS EXPECTED  Goal: Will remain free from falls  Outcome: PROGRESSING AS EXPECTED     Problem: Venous Thromboembolism (VTW)/Deep Vein Thrombosis (DVT) Prevention:  Goal: Patient will participate in Venous Thrombosis (VTE)/Deep Vein Thrombosis (DVT)Prevention Measures  Outcome: PROGRESSING AS EXPECTED  Flowsheets (Taken 10/4/2020 5604)  Pharmacologic Prophylaxis Used: Apixaban     Problem: Bowel/Gastric:  Goal: Normal bowel function is maintained or improved  Outcome: PROGRESSING AS EXPECTED  Goal: Will not experience complications related to bowel motility  Outcome: PROGRESSING AS EXPECTED     Problem: Knowledge Deficit  Goal: Knowledge of disease process/condition, treatment plan, diagnostic tests, and medications will improve  Outcome: PROGRESSING AS EXPECTED  Goal: Knowledge of the prescribed therapeutic regimen will improve  Outcome: PROGRESSING AS EXPECTED     Problem: Discharge Barriers/Planning  Goal: Patient's continuum of care needs will be met  Outcome: PROGRESSING AS EXPECTED     Problem: Respiratory:  Goal: Respiratory status will improve  Outcome: PROGRESSING AS EXPECTED     Problem: Inadequate nutrient intake  Goal: Patient to consume greater than or equal to 50% of meals  Outcome: PROGRESSING AS EXPECTED     Problem: Communication  Goal: The ability to communicate needs accurately and effectively will improve  Description: Patient able to verbalize needs.  Will continue to monitor.   Outcome: MET     Problem: Pain Management  Goal: Pain level will decrease to patient's comfort goal  Outcome: MET

## 2020-10-05 NOTE — CARE PLAN
Problem: Inadequate nutrient intake  Goal: Patient to consume greater than or equal to 50% of meals  Outcome: MET   Pt with consistently good PO intake %, often %. Continue to encourage good PO intake.

## 2020-10-05 NOTE — THERAPY
"Occupational Therapy  Daily Treatment     Patient Name: Jordan Alexander  Age:  75 y.o., Sex:  male  Medical Record #: [id number]  Today's Date: 10/5/2020     Precautions  Precautions: Fall Risk  Comments: impaired memory, orthostatic hypotension         Subjective    \"Should I dry my foot while standing? Something seems off about that, but I don't know what to do.\"     Objective       10/05/20 1031   Cognition    Level of Consciousness Alert   Ability To Follow Commands 1 Step   Safety Awareness Impaired  (needed reminders to use brakes x3 during 60 minute session.)   Functional Level of Assist   Grooming Modified Independent  (clipping and filing nails)   Bathing Supervision   Bathing Description Hand held shower;Grab bar;Tub bench;Increased time;Verbal cueing  (distant supervision. Answered pt questions re: safety)   Upper Body Dressing Modified Independent   Upper Body Dressing Description Increased time   Lower Body Dressing Modified Independent   Lower Body Dressing Description Increased time   Toileting Modified Independent   Toileting Description Grab bar;Increased time   Toilet Transfers Supervised  (distant supervision)   Toilet Transfer Description Grab bar;Verbal cueing  (cues to lock brakes)   Interdisciplinary Plan of Care Collaboration   IDT Collaboration with  Physical Therapist   Patient Position at End of Therapy Seated;Call Light within Reach;Tray Table within Reach;Phone within Reach   Collaboration Comments re: CLOF    OT Total Time Spent   OT Individual Total Time Spent (Mins) 60   OT Charge Group   OT Self Care / ADL 4       Assessment    Pt with calm, bright affect. Pt forgetful with w/c brakes, however, will not be using a w/c at home. Pt would be mod I when his BP is good and he is not using a w/c. Pt feels prepared to d/c home.    Strengths: Adequate strength, Independent prior level of function, Pleasant and cooperative, Willingly participates in therapeutic activities  Barriers: Confused, " Decreased endurance, Impaired activity tolerance, Impaired balance, Impaired carryover of learning, Impaired insight/denial of deficits, Impaired functional cognition    Plan    D/c tomorrow    Occupational Therapy Goals     Problem: Dressing     Dates: Start: 09/29/20       Goal: STG-Within one week, patient will dress UB     Dates: Start: 09/29/20       Description: 1) Individualized Goal: with distant supervision   2) Interventions: OT Self Care/ADL, OT Cognitive Skill Dev, OT Neuro Re-Ed/Balance, OT Therapeutic Activity, OT Evaluation, and OT Therapeutic Exercise            Goal: STG-Within one week, patient will dress LB     Dates: Start: 09/29/20       Description: 1) Individualized Goal: with distant supervision   2) Interventions: OT Self Care/ADL, OT Cognitive Skill Dev, OT Neuro Re-Ed/Balance, OT Therapeutic Activity, OT Evaluation, and OT Therapeutic Exercise                Problem: OT Long Term Goals     Dates: Start: 09/26/20       Goal: LTG-By discharge, patient will complete basic self care tasks     Dates: Start: 09/26/20       Description:  1) Individualized Goal:  with mod I  2) Interventions:  OT Self Care/ADL, OT Cognitive Skill Dev, OT Neuro Re-Ed/Balance, OT Therapeutic Activity, OT Evaluation, and OT Therapeutic Exercise          Goal: LTG-By discharge, patient will perform bathroom transfers     Dates: Start: 09/26/20       Description:  1) Individualized Goal:  with mod I  2) Interventions:  OT Self Care/ADL, OT Cognitive Skill Dev, OT Neuro Re-Ed/Balance, OT Therapeutic Activity, OT Evaluation, and OT Therapeutic Exercise

## 2020-10-06 VITALS
TEMPERATURE: 97.7 F | BODY MASS INDEX: 24.91 KG/M2 | RESPIRATION RATE: 18 BRPM | DIASTOLIC BLOOD PRESSURE: 67 MMHG | SYSTOLIC BLOOD PRESSURE: 142 MMHG | HEART RATE: 68 BPM | OXYGEN SATURATION: 96 % | HEIGHT: 67 IN | WEIGHT: 158.73 LBS

## 2020-10-06 PROCEDURE — 700102 HCHG RX REV CODE 250 W/ 637 OVERRIDE(OP): Performed by: NURSE PRACTITIONER

## 2020-10-06 PROCEDURE — 99239 HOSP IP/OBS DSCHRG MGMT >30: CPT | Performed by: PHYSICAL MEDICINE & REHABILITATION

## 2020-10-06 PROCEDURE — 700102 HCHG RX REV CODE 250 W/ 637 OVERRIDE(OP): Performed by: PHYSICAL MEDICINE & REHABILITATION

## 2020-10-06 PROCEDURE — A9270 NON-COVERED ITEM OR SERVICE: HCPCS | Performed by: NURSE PRACTITIONER

## 2020-10-06 PROCEDURE — A9270 NON-COVERED ITEM OR SERVICE: HCPCS | Performed by: PHYSICAL MEDICINE & REHABILITATION

## 2020-10-06 RX ADMIN — Medication 1 G: at 11:00

## 2020-10-06 RX ADMIN — APIXABAN 2.5 MG: 5 TABLET, FILM COATED ORAL at 07:42

## 2020-10-06 RX ADMIN — THIAMINE HCL TAB 100 MG 100 MG: 100 TAB at 07:42

## 2020-10-06 RX ADMIN — SOTALOL HYDROCHLORIDE 80 MG: 80 TABLET ORAL at 06:01

## 2020-10-06 RX ADMIN — Medication 1 G: at 07:42

## 2020-10-06 RX ADMIN — CHOLECALCIFEROL TAB 25 MCG (1000 UNIT) 4000 UNITS: 25 TAB at 07:42

## 2020-10-06 RX ADMIN — MONTELUKAST 10 MG: 10 TABLET, FILM COATED ORAL at 07:43

## 2020-10-06 RX ADMIN — ACETAMINOPHEN 500 MG: 500 TABLET, FILM COATED ORAL at 07:42

## 2020-10-06 ASSESSMENT — PAIN DESCRIPTION - PAIN TYPE: TYPE: ACUTE PAIN

## 2020-10-06 NOTE — CARE PLAN
Problem: Safety  Goal: Will remain free from injury  Description: Pt uses call light consistently and appropriately. Waits for assistance does not attempt self transfer this shift. Able to verbalize needs.   Outcome: PROGRESSING AS EXPECTED  Goal: Will remain free from falls  Outcome: PROGRESSING AS EXPECTED  Note:   Patient uses call light consistently and appropriately this shift.  Waits for assistance when needed and does not attempt self transfer.  Able to verbalize needs.  Will continue to monitor.       Problem: Infection  Goal: Will remain free from infection  Outcome: PROGRESSING AS EXPECTED  Note:   Patient remains free from s/s infection; afebrile.  Will continue to monitor.

## 2020-10-06 NOTE — PROGRESS NOTES
"Rehab Progress Note     Encounter Date: 10/5/2020    CC: encephalopathy, decreased mobility    Interval Events (Subjective)  Patient sitting up in room. He reports he has questions about his discharge medications. Reviewed medications with the patient and his wife. Discussed that metoprolol was held at [facility] for low BP at times. Discussed that he can follow-up with cardiology in 2 weeks and discuss lower dose of sotalol and whether to restart metoprolol. Otherwise reviewed ongoing salt tabs at this time, Na 132 today. He reports he will drink Gatorade or Pedialyte if needed. He reports he will follow-up with his PCP and iscuss it at that time. Otherwise denies SOB. Denies NVD.     IDT Team Meeting 9/29/2020  DC/Disposition:  10/6/20    Objective:  VITAL SIGNS: /66   Pulse 75   Temp 36.1 °C (96.9 °F) (Temporal)   Resp 18   Ht 1.702 m (5' 7\")   Wt 72 kg (158 lb 11.7 oz)   SpO2 97%   BMI 24.86 kg/m²   Gen: NAD  Psych: Mood and affect appropriate  CV: RRR, no edema  Resp: CTAB, no upper airway sounds  Abd: NTND  Neuro: AOx4, following commands    No results found for this or any previous visit (from the past 72 hour(s)).    Current Facility-Administered Medications   Medication Frequency   • acetaminophen (TYLENOL) tablet 500 mg TID   • senna-docusate (PERICOLACE or SENOKOT S) 8.6-50 MG per tablet 2 Tab BID PRN    And   • polyethylene glycol/lytes (MIRALAX) PACKET 1 Packet QDAY PRN    And   • magnesium hydroxide (MILK OF MAGNESIA) suspension 30 mL QDAY PRN    And   • bisacodyl (DULCOLAX) suppository 10 mg QDAY PRN   • melatonin tablet 6 mg QHS   • amLODIPine (NORVASC) tablet 5 mg QHS   • traZODone (DESYREL) tablet 50 mg QHS PRN   • artificial tears ophthalmic solution 1 Drop Q2HRS PRN   • vitamin D (cholecalciferol) tablet 4,000 Units DAILY   • oxyCODONE immediate-release (ROXICODONE) tablet 2.5 mg Q3HRS PRN   • Respiratory Therapy Consult Continuous RT   • apixaban (ELIQUIS) tablet 2.5 mg BID   • " budesonide-formoterol (SYMBICORT) 160-4.5 MCG/ACT inhaler 2 Puff BID PRN   • ferrous sulfate tablet 325 mg Q48HRS   • lidocaine (LIDODERM) 5 % 1 Patch QDAY PRN   • montelukast (SINGULAIR) tablet 10 mg DAILY   • simvastatin (ZOCOR) tablet 20 mg Q EVENING   • sodium chloride (SALT) tablet 1 g TID WITH MEALS   • sotalol (BETAPACE) tablet 80 mg TWICE DAILY   • thiamine tablet 100 mg DAILY       Orders Placed This Encounter   Procedures   • Diet Order Regular (whole meds )     Standing Status:   Standing     Number of Occurrences:   1     Order Specific Question:   Diet:     Answer:   Regular [1]     Comments:   whole meds      Order Specific Question:   Nutrient modifications:     Answer:   High Protein [4]     Order Specific Question:   Electrolyte modifications:     Answer:   High Potassium [4]     Order Specific Question:   Texture Modifier     Answer:   Level 7 - Regular/Easy to Chew     Order Specific Question:   Liquid level     Answer:   Level 0 - Thin     Order Specific Question:   Consistency/Fluid modifications:     Answer:   1500 ml Fluid Restriction [9]       Assessment:  Active Hospital Problems    Diagnosis   • *Acute encephalopathy   • Hypokalemia   • Back pain   • Hypomagnesemia   • Paroxysmal atrial fibrillation (HCC)       Medical Decision Making and Plan:  Acute encephalopathy  - Etiology hypertensive vs metabolic   - No stroke found   - EEG with encephalopathy, no seizure captured.   - Admit to IPR   - Evals by PT/OT and SLP on arrival depending on schedule   -Resolved confusion     Dypshagia  SLP to consult.  [facility] [place] - advanced to regular    Pain  - Tylenol , lidoderm patch  -Decrease to 500 mg TID     PAF   - on eliquis, sotalol, amlodipine, apixaban, hydralazine, simvastatin   - Continue Sotalol 80 mg, follow-up next week with Cardiology    Pulm  - montelukast      Hyponatremia/SIADH  - Na 127 on day of admission   - 1L fluid restriction -> 1.5 L as becoming volume depleted  - Salt tabs 1g  TID, continue  - Appreciate Nephrology recommendations. Signed off, still on salt tabs with meals. Stable at 132 on 10/2/20. Continue 1.5L fluid restriction and salt tabs.      Hypokalemia  - Corrected to 4.5 on day of admission   - Nephrology will continue to follow and titrate   - High protein and high K diet     Thrombocytosis  - Plt 518 on 10/2/20  - monitor      Hypertension/Hypotension  Patient on Amlodipine 10 mg, Sotalol 80 mg BID and Hydralazine 25 mg q8h  Discontinue Hydralazine. 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 FLC ratio 1.74  -Referral to Hematology placed    Anemia:   -Hgb 12.1 on admission   - improving, continue to monitor     Hypomagnesemia  Improved, will continue to monitor    Insomnia  Start on Melatonin, if needed PRN Trazodone  Increase Melatonin to 6 mg    Vitamin D Deficiency:  26 on admission. Started on 4000 U    DVT Ppx   Patient on Eliquis    Total time:  26 minutes.  I spent greater than 50% of the time for patient care, counseling, and coordination on this date, including unit/floor time, and face-to-face time with the patient as per interval events and assessment and plan above. Topics discussed included SIADH discussion, discharge medications, elevated SBP and increase Amlodipine.     Td Andrews M.D.

## 2020-10-06 NOTE — DISCHARGE SUMMARY
Rehab Discharge Summary    Admission Date: 9/25/2020    Discharge Date: 10/6/2020    Attending Provider: Td Andrews MD/PhD    Admission Diagnosis:   Active Hospital Problems    Diagnosis   • *Acute encephalopathy   • Hypokalemia   • Back pain   • Hypomagnesemia   • Paroxysmal atrial fibrillation (HCC)       Discharge Diagnosis:  Active Hospital Problems    Diagnosis   • *Acute encephalopathy   • Hypokalemia   • Back pain   • Hypomagnesemia   • Paroxysmal atrial fibrillation (HCC)       HPI per H&P:  The patient is a 75 y.o. male with a past medical history of PAF, DL, anemia;  who initially  Presented to [facility] on 9/12/2020 12:35 PM with AMS that was progressively worsening with right sided weakness. Code stroke was called. No stroke was found. Patient determined to be encephalopathic, likely due to hypertension and possibly metabolic. Hospital course with hyponatremia/?SIADH/nephrology following, anemia, hypokalemia, thrombocytosis, and paroxysmal Afib on eliquis.     Patient was admitted to [facility] on 9/25/2020.     Hospital Course by Problem List:  Acute encephalopathy  - Etiology hypertensive vs metabolic. No stroke found   - EEG with encephalopathy, no seizure captured.   -Resolved confusion. Patient underwent acute inpatient rehabilitation from 9/25/20 to 10/6/20 with good improvement in mobility, ADLs, and cognition.      Dypshagia  SLP to consult.  MBS on 9/28/20 - advanced to regular     Pain  - Tylenol , lidoderm patch  - Continue 500 mg TID     PAF   - on eliquis, sotalol, amlodipine, apixaban, hydralazine, simvastatin   - Continue Sotalol 80 mg, follow-up next week with Cardiology    Pulm  - montelukast      Hyponatremia/SIADH  - Na 127 on day of admission   - 1L fluid restriction -> 1.5 L as becoming volume depleted  - Salt tabs 1g TID, continue  - Appreciate Nephrology recommendations. Signed off, still on salt tabs with meals. Stable at 132 on 10/2/20.  Continue 1.5L fluid restriction and salt tabs.      Hypokalemia  - Corrected to 4.5 on day of admission   - Nephrology will continue to follow and titrate   - High protein and high K diet     Thrombocytosis  - Plt 518 on 10/2/20  - monitor      Hypertension/Hypotension  Patient on Amlodipine 10 mg, Sotalol 80 mg BID and Hydralazine 25 mg q8h  Discontinue Hydralazine. 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 FLC ratio 1.74  -Referral to Hematology placed     Anemia:   -Hgb 12.1 on admission   - improving, continue to monitor      Hypomagnesemia  Improved, will continue to monitor     Insomnia  Start on Melatonin, if needed PRN Trazodone  Increase Melatonin to 6 mg     Vitamin D Deficiency:  26 on admission. Started on 4000 U     DVT Ppx   Patient on Eliquis    Functional Status at Discharge  Eating:  Independent  Eating Description:  Set-up of equipment or meal/tube feeding, Verbal cueing  Grooming:  Modified Independent(clipping and filing nails)  Grooming Description:  Seated in wheelchair at sink  Bathing:  Supervision  Bathing Description:  Hand held shower, Grab bar, Tub bench, Increased time, Verbal cueing(distant supervision. Answered pt questions re: safety)  Upper Body Dressing:  Modified Independent  Upper Body Dressing Description:  Increased time  Lower Body Dressing:  Modified Independent  Lower Body Dressing Description:  Increased time  Discharge Location : Home  Patient Discharging with Assist of: Spouse / Significant Other  Level of Supervision Required: Intermittent Supervision  Recommended Services Upon Discharge: Outpatient Occupational Therapy  Long Term Goals Met: 0  Long Term Goals Not Met: 2  Reason(s) for Goals Not Met: Pt's safety awareness and blood pressure are limiting factors in pt being able to complete  self care independently.  Criteria for Termination of Services: Maximum Function Achieved for Inpatient Rehabilitation  Walk:  Supervised  Distance Walked:  250  Number of Times Distance Was Traveled:  (x300' and x120')  Assistive Device:  None  Gait Deviation:  Bradykinetic  Wheelchair:  Supervised  Distance Propelled:  150   Wheelchair Description:  Extra time, Verbal cueing  Stairs Supervised  Stairs Description Hand rails, Extra time, Supervision for safety  Discharge Location: Home  Patient Discharging with Assist of: Spouse / Significant Other  Level of Supervision Required Upon Discharge: Intermittent Supervision  Recommended Equipment for Discharge: None  Recommeded Services Upon Discharge: Outpatient Physical Therapy  Criteria for Termination of Services: Maximum Function Achieved for Inpatient Rehabilitation  Comprehension:  Supervision  Comprehension Description:  Glasses, Verbal cues  Expression:  Minimal Assist  Expression Description:  Verbal cueing  Social Interaction:  Independent  Social Interaction Description:     Problem Solving:  Supervision(1 cue )  Problem Solving Description:  Therapy schedule, Verbal cueing, Bed/chair alarm, Seat belt  Memory:  Supervision(occasional cues for memory as related to tasks peformed)  Memory Description:  Therapy schedule, Supervision, Verbal cueing, Increased time, Bed/chair alarm       ITd M.D., personally performed a complete drug regimen review and no potential clinically significant medication issues were identified.   Discharge Medication:     Medication List      CHANGE how you take these medications      Instructions   sotalol 80 MG Tabs  What changed:   · how much to take  · additional instructions  Commonly known as: BETAPACE   Take 1 Tab by mouth 2 times a day.  Dose: 80 mg        CONTINUE taking these medications      Instructions   acetaminophen 500 MG Tabs  Commonly known as: TYLENOL   Take 1,000 mg by mouth 2 times a day as  needed for Moderate Pain.  Dose: 1,000 mg     Allegra Allergy 180 MG tablet  Generic drug: fexofenadine   Take 180 mg by mouth every day.  Dose: 180 mg     amLODIPine 10 MG Tabs  Commonly known as: NORVASC   Take 1 Tab by mouth every bedtime.  Dose: 10 mg     apixaban 5mg Tabs  Commonly known as: ELIQUIS   Take 1 Tab by mouth 2 Times a Day.  Dose: 5 mg     budesonide-formoterol 160-4.5 MCG/ACT Aero  Commonly known as: SYMBICORT   Inhale 2 Puffs by mouth 2 times a day as needed (shortness of breath).  Dose: 2 Puff     ferrous sulfate 325 (65 Fe) MG tablet   Take 1 Tab by mouth every 48 hours.  Dose: 325 mg     Lidocaine 4 % Ptch   1 Patch by Apply externally route 1 time daily as needed (back pain).  Dose: 1 Patch     Lupron Depot (6-Month) 45 MG Kit kit  Generic drug: leuprolide acetate (6 Month)   45 mg by Intramuscular route every 6 months.  Dose: 45 mg     montelukast 10 MG Tabs  Commonly known as: SINGULAIR   Take 1 Tab by mouth every day.  Dose: 10 mg     potassium chloride SA 10 MEQ Tbcr  Commonly known as: K-DUR   Take 1 Tab by mouth every day.  Dose: 10 mEq     PreserVision AREDS 2 Caps   Take 1 Cap by mouth every day.  Dose: 1 Cap     simvastatin 20 MG Tabs  Commonly known as: ZOCOR   Take 1 Tab by mouth every day.  Dose: 20 mg     sodium chloride 1 GM Tabs  Commonly known as: SALT   Take 1 Tab by mouth 3 times a day, with meals.  Dose: 1 g     thiamine 100 MG tablet  Commonly known as: THIAMINE   Take 1 Tab by mouth every day.  Dose: 100 mg        STOP taking these medications    hydrALAZINE 25 MG Tabs  Commonly known as: APRESOLINE     magnesium oxide 400 MG Tabs tablet  Commonly known as: MAG-OX     Phazyme 180 MG Caps  Generic drug: Simethicone            Discharge Diet:  Regular    Discharge Activity:  As tolerated     Disposition:  Patient to discharge home with family support and community resources.     Equipment:  FWW    Follow-up & Discharge Instructions:  Follow up with your primary care provider  (PCP) within 7-10 days of discharge to review your medications and take over your care.     If you develop chest pain, fever, chills, change in neurologic function (weakness, sensation changes, vision changes), or other concerning sxs, seek immediate medical attention or call 911.      Condition on Discharge:  Good    More than 33 minutes was spent on discharging this patient, including face-to-face time, prescription management, and the dictation of this note.    Td Andrews M.D.    Date of Service: 10/6/2020

## 2020-10-06 NOTE — DISCHARGE PLANNING
Case management Summary:   Met with patient & wife, Cynthia, prior to discharge.   Reviewed all follow up appointments: cardiology ([place]) & PCP in [place]..   Reviewed at PCP f/u appointment for patient to get OP therapy referrals for [place]: PT/OT/SLP.  No DME needed.    During hospitalization, I have provided support and education and have been available for questions and information during hours of operation, communicated with therapy team and MD along with providing links/resources  to outside services.    Patient verbalizes agreement with all plans and has an understanding of the next steps within the post acute services.     Individualized Goals:   1. Get home to [place]  2. Make Dr. Beltre aware of medication changes  3. Set up follow up     Outcome:   1. Goal partially met: patient discharging home w/ wife, initially to [place], then traveling to [place] for f/u cardiology appt, then returning to [place] for PCP appt & referrals.  2. Goal met & completed: DC summary faxed to cardiology Dr. Bimal Thurston.  3. Goal met & completed: f/u appt in place.

## 2020-10-06 NOTE — DISCHARGE PLANNING
Case Management Discharge Instructions        Discharge Location: Home with Outpatient Services     Agency Name / Phone: To be set up in [place] at PCP follow up appointment   Outpatient Services: Physical Therapy, Speech Therapy, Occupational Therapy     Follow-up Information:    Bimal Thurston MD  [phone]  Cardiology    [address]    [place] [address]   Wednesday 10.14.2020, appointment at 10:30am.     We will fax records to office.      Michael Garcia MD  [phone]  Primary Care Provider    [address]    [place] [address]   Wednesday 10.21.2020, appointment at 11:00am.     We will fax records to office.

## 2020-10-06 NOTE — PROGRESS NOTES
Patient discharged to home. VSS, no reports of pain or appearances of SOB or distress.   Discharge instructions about medication schedule, medication indications and side effects and warnings provided.  DC instructions specific to PT/OT/ST given.  Follow - up appointments discussed.  Signs and symptoms of both heart attacks and strokes discussed, and patient and/or significant other instructed to call 911 immediately if these are noticed.  Diagnoses -specific discharge instructions including those on fall prevention and suicide/depression given to patient.  Understanding verbalized. Escorted out by staff using a wheelchair/walker.  Case management went over f/u appts and pharmacist also spoke with patient about meds, as did I.

## 2020-10-06 NOTE — DISCHARGE INSTRUCTIONS
[facility] NURSING DISCHARGE INSTRUCTIONS    Blood Pressure : 156/68  Weight: 72 kg (158 lb 11.7 oz)  Nursing recommendations for Jordan Alexander at time of discharge are as follows:  Client verbalized understanding of all discharge instructions and prescriptions.     Review all your home medications and newly ordered medications with your doctor and/or pharmacist. Follow medication instructions as directed by your doctor and/or pharmacist.    Pain Management:   Discharge Pain Medication Instructions:  Comfort Goal: Sleep Comfortably  Notify your primary care provider if pain is unrelieved with these measures, if the pain is new, or increased in intensity.    Discharge Skin Characteristics: Warm, Dry  Discharge Skin Exam: Clear     Skin / Wound Care Instructions: Please contact your primary care physician for any change in skin integrity.     If You Have Surgical Incisions / Wounds:  Monitor surgical site(s) for signs of increased swelling, redness or symptoms of drainage from the site or fever as this could indicate signs and symptoms of infection. If these symptoms are noted, notifiy your primary care provider.      Discharge Safety Instructions: Should Not Be Left Alone In The House     Discharge Safety Concerns: Unsteady Gait  The interdisciplinary team has made recommendation that you should not be left alone  in the house due to weakness and unsteady gait  Anti-embolic stockings are not required to increase circulation to the lower extremities.    Discharge Diet:       Discharge Liquids:    Discharge Bowel Function: Continent  Please contact your primary care physician for any changes in bowel habits.  Discharge Bowel Program:    Discharge Bladder Function: Continent  Discharge Urinary Devices: None      Nursing Discharge Plan:        Case Management Discharge Instructions:   Discharge Location:    Agency Name/Address/Phone:    Home Health:    Outpatient Services:    DME Provider/Phone:     Medical Equipment Ordered:    Prescription Faxed to:        Discharge Medication Instructions:  Below are the medications your physician expects you to take upon discharge:      Fall Prevention in the Home, Adult  Falls can cause injuries and can affect people from all age groups. There are many simple things that you can do to make your home safe and to help prevent falls. Ask for help when making these changes, if needed.  What actions can I take to prevent falls?  General instructions  · Use good lighting in all rooms. Replace any light bulbs that burn out.  · Turn on lights if it is dark. Use night-lights.  · Place frequently used items in easy-to-reach places. Lower the shelves around your home if necessary.  · Set up furniture so that there are clear paths around it. Avoid moving your furniture around.  · Remove throw rugs and other tripping hazards from the floor.  · Avoid walking on wet floors.  · Fix any uneven floor surfaces.  · Add color or contrast paint or tape to grab bars and handrails in your home. Place contrasting color strips on the first and last steps of stairways.  · When you use a stepladder, make sure that it is completely opened and that the sides are firmly locked. Have someone hold the ladder while you are using it. Do not climb a closed stepladder.  · Be aware of any and all pets.  What can I do in the bathroom?         · Keep the floor dry. Immediately clean up any water that spills onto the floor.  · Remove soap buildup in the tub or shower on a regular basis.  · Use non-skid mats or decals on the floor of the tub or shower.  · Attach bath mats securely with double-sided, non-slip rug tape.  · If you need to sit down while you are in the shower, use a plastic, non-slip stool.  · Install grab bars by the toilet and in the tub and shower. Do not use towel bars as grab bars.  What can I do in the bedroom?  · Make sure that a bedside light is easy to reach.  · Do not use oversized bedding  that drapes onto the floor.  · Have a firm chair that has side arms to use for getting dressed.  What can I do in the kitchen?  · Clean up any spills right away.  · If you need to reach for something above you, use a sturdy step stool that has a grab bar.  · Keep electrical cables out of the way.  · Do not use floor polish or wax that makes floors slippery. If you must use wax, make sure that it is non-skid floor wax.  What can I do in the stairways?  · Do not leave any items on the stairs.  · Make sure that you have a light switch at the top of the stairs and the bottom of the stairs. Have them installed if you do not have them.  · Make sure that there are handrails on both sides of the stairs. Fix handrails that are broken or loose. Make sure that handrails are as long as the stairways.  · Install non-slip stair treads on all stairs in your home.  · Avoid having throw rugs at the top or bottom of stairways, or secure the rugs with carpet tape to prevent them from moving.  · Choose a carpet design that does not hide the edge of steps on the stairway.  · Check any carpeting to make sure that it is firmly attached to the stairs. Fix any carpet that is loose or worn.  What can I do on the outside of my home?  · Use bright outdoor lighting.  · Regularly repair the edges of walkways and driveways and fix any cracks.  · Remove high doorway thresholds.  · Trim any shrubbery on the main path into your home.  · Regularly check that handrails are securely fastened and in good repair. Both sides of any steps should have handrails.  · Install guardrails along the edges of any raised decks or porches.  · Clear walkways of debris and clutter, including tools and rocks.  · Have leaves, snow, and ice cleared regularly.  · Use sand or salt on walkways during winter months.  · In the garage, clean up any spills right away, including grease or oil spills.  What other actions can I take?  · Wear closed-toe shoes that fit well and  support your feet. Wear shoes that have rubber soles or low heels.  · Use mobility aids as needed, such as canes, walkers, scooters, and crutches.  · Review your medicines with your health care provider. Some medicines can cause dizziness or changes in blood pressure, which increase your risk of falling.  Talk with your health care provider about other ways that you can decrease your risk of falls. This may include working with a physical therapist or  to improve your strength, balance, and endurance.  Where to find more information  · Centers for Disease Control and Prevention, STEADI: https://www.cdc.gov  · National Brandt on Aging: https://if2sazr.zoie.nih.gov  Contact a health care provider if:  · You are afraid of falling at home.  · You feel weak, drowsy, or dizzy at home.  · You fall at home.  Summary  · There are many simple things that you can do to make your home safe and to help prevent falls.  · Ways to make your home safe include removing tripping hazards and installing grab bars in the bathroom.  · Ask for help when making these changes in your home.  This information is not intended to replace advice given to you by your health care provider. Make sure you discuss any questions you have with your health care provider.  Document Released: 12/08/2003 Document Revised: 11/30/2018 Document Reviewed: 08/02/2018  [employer] Patient Education © 2020 [employer]      Depression / Suicide Risk    As you are discharged from this [facility] facility, it is important to learn how to keep safe from harming yourself.    Recognize the warning signs:  · Abrupt changes in personality, positive or negative- including increase in energy   · Giving away possessions  · Change in eating patterns- significant weight changes-  positive or negative  · Change in sleeping patterns- unable to sleep or sleeping all the time   · Unwillingness or inability to communicate  · Depression  · Unusual sadness, discouragement  and loneliness  · Talk of wanting to die  · Neglect of personal appearance   · Rebelliousness- reckless behavior  · Withdrawal from people/activities they love  · Confusion- inability to concentrate     If you or a loved one observes any of these behaviors or has concerns about self-harm, here's what you can do:  · Talk about it- your feelings and reasons for harming yourself  · Remove any means that you might use to hurt yourself (examples: pills, rope, extension cords, firearm)  · Get professional help from the community (Mental Health, Substance Abuse, psychological counseling)  · Do not be alone:Call your Safe Contact- someone whom you trust who will be there for you.  · Call your local CRISIS HOTLINE 588-7032 or 028-975-1105  · Call your local Children's Mobile Crisis Response Team [place] (984) 448-0646 or www.[employer]  · Call the toll free National Suicide Prevention Hotlines   · National Suicide Prevention Lifeline 976-844-JBFP (5867)  · [place] Hope Line Network 800-SUICIDE (593-7525)    Physical Therapy Discharge Instructions for Jordan ARCEO Catherine    10/5/2020    Level of Assist Required for Ambulation: No Assist on Flat Surfaces, Supervision on Curbs, Supervision on Stairs  Device Recommended for Ambulation: None  Level of Assist Required for Transfers: Requires No Assist  Device Recommended for Transfers: None  Home Exercise Program: Refer to Home Exercise Program Handout for Details    Jadon Ortega for all the hard work you put in here at rehab! Take care of yourself at home and remember to TAKE IT SLOW when moving and check for any dizziness when standing. Good luck to you on your continued recovery!  -Jocelyn Fernandez, PT, DPT        Occupational Therapy Discharge Instructions for Jordan ARCEO Catherine    10/5/2020    Level of Assist Required for Eating: Able to Complete Eating without Assist  Level of Assist Required for Grooming: Able to Complete Grooming without Assist  Level of Assist Required for  Dressing: Requires Supervision with Dressing  Level of Assist Required for Toileting: Able to Complete Toileting without Assist  Level of Assist Required for Toilet Transfer: Requires Supervision with Toilet Transfer  Equipment for Toilet Transfer: Grab Bars by Toilet  Level of Assist Required for Bathing: Requires Supervision with Bathing  Level of Assist Required for Shower Transfer: Requires Supervision with Shower Transfer  Level of Assist Required for Home Mgmt: Requires Supervision with Home Management  Level of Assist Required for Meal Prep: Requires Supervision with Meal Preparation  Driving: May not Drive, Please Contact Physician for Further Information  Home Exercise Program: Refer to Home Exercise Program Handout for Details      Jordan, it was a pleasure working with you while you've been at rehab! You can do most things on your own, but will need distant supervision for some more risky tasks such as showering. Please have someone with you at all times on days when your blood pressure is low or if you've been dizzy. Best of luck on the rest of your recovery!  Irena Brown, OTR/L, CTRS

## 2021-04-11 NOTE — PROGRESS NOTES
Pt left for xray at 1730 via [employer] and back to the room with transport at 1800 without incident.   no

## 2022-04-09 NOTE — THERAPY
"Occupational Therapy  Daily Treatment     Patient Name: Jordan Alexander  Age:  75 y.o., Sex:  male  Medical Record #: [id number]  Today's Date: 10/2/2020     Precautions  Precautions: Fall Risk  Comments: memory deficits, orhtostatic hypotension         Subjective    \"We have a BP cuff at home, but I don't know how we would measure his sodium. It isn't like we will take labs at home or anything.\"     Objective       10/02/20 1001   Functional Level of Assist   Toileting Supervision  (distant supervision)   Toileting Description Grab bar;Supervision for safety;Increased time   Toilet Transfers Supervised   Toilet Transfer Description Verbal cueing;Supervision for safety  (distant supervision 2/2 dizziness)   Tub / Shower Transfers Supervised   Tub Shower Transfer Description   (to tub bench; at home, pt has fully walk in shower (no step))   Interdisciplinary Plan of Care Collaboration   IDT Collaboration with  Family / Caregiver   Patient Position at End of Therapy Seated;Self Releasing Lap Belt Applied;Call Light within Reach;Tray Table within Reach;Phone within Reach;Family / Friend in Room   Collaboration Comments re: family training   OT Total Time Spent   OT Individual Total Time Spent (Mins) 30   OT Charge Group   OT Self Care / ADL 1   OT Therapy Activity 1     Family training on CLOF, need for continued supervision upon d/c, and DME. Pt will have shower seat at both of his homes. Education on pt's performance with grocery tasks and how OT is not recommending he do lengthy grocery trips due to orthostatic hypotension. Educated wife and pt on orthostatic hypotension. Provided emotional support as wife seemed overwhelmed with pt's frequent medical issues, doctors appointments, and monitoring.    Assessment    Pt and wife receptive to education this date. Pt and wife aware that OT is recommending wife always be with him when he is standing to prevent fall. Pt has had several recent falls per wife.     Strengths: " Adequate strength, Independent prior level of function, Pleasant and cooperative, Willingly participates in therapeutic activities  Barriers: Confused, Decreased endurance, Impaired activity tolerance, Impaired balance, Impaired carryover of learning, Impaired insight/denial of deficits, Impaired functional cognition    Plan    monitor BP, ADLs/IADLs , related mobility and cognition  strength/endurance building ,balance activity     Occupational Therapy Goals     Problem: Dressing     Dates: Start: 09/29/20       Goal: STG-Within one week, patient will dress UB     Dates: Start: 09/29/20       Description: 1) Individualized Goal: with distant supervision   2) Interventions: OT Self Care/ADL, OT Cognitive Skill Dev, OT Neuro Re-Ed/Balance, OT Therapeutic Activity, OT Evaluation, and OT Therapeutic Exercise            Goal: STG-Within one week, patient will dress LB     Dates: Start: 09/29/20       Description: 1) Individualized Goal: with distant supervision   2) Interventions: OT Self Care/ADL, OT Cognitive Skill Dev, OT Neuro Re-Ed/Balance, OT Therapeutic Activity, OT Evaluation, and OT Therapeutic Exercise                Problem: OT Long Term Goals     Dates: Start: 09/26/20       Goal: LTG-By discharge, patient will complete basic self care tasks     Dates: Start: 09/26/20       Description:  1) Individualized Goal:  with mod I  2) Interventions:  OT Self Care/ADL, OT Cognitive Skill Dev, OT Neuro Re-Ed/Balance, OT Therapeutic Activity, OT Evaluation, and OT Therapeutic Exercise          Goal: LTG-By discharge, patient will perform bathroom transfers     Dates: Start: 09/26/20       Description:  1) Individualized Goal:  with mod I  2) Interventions:  OT Self Care/ADL, OT Cognitive Skill Dev, OT Neuro Re-Ed/Balance, OT Therapeutic Activity, OT Evaluation, and OT Therapeutic Exercise                     Discharged